# Patient Record
Sex: MALE | Race: WHITE | Employment: OTHER | URBAN - METROPOLITAN AREA
[De-identification: names, ages, dates, MRNs, and addresses within clinical notes are randomized per-mention and may not be internally consistent; named-entity substitution may affect disease eponyms.]

---

## 2017-04-18 ENCOUNTER — OFFICE VISIT (OUTPATIENT)
Dept: CARDIOLOGY CLINIC | Age: 62
End: 2017-04-18

## 2017-04-18 VITALS
WEIGHT: 210 LBS | DIASTOLIC BLOOD PRESSURE: 70 MMHG | SYSTOLIC BLOOD PRESSURE: 122 MMHG | HEART RATE: 66 BPM | BODY MASS INDEX: 25.57 KG/M2 | HEIGHT: 76 IN

## 2017-04-18 DIAGNOSIS — R07.9 CHEST PAIN, UNSPECIFIED TYPE: Primary | ICD-10-CM

## 2017-04-18 PROCEDURE — 93000 ELECTROCARDIOGRAM COMPLETE: CPT | Performed by: INTERNAL MEDICINE

## 2017-04-18 PROCEDURE — 99214 OFFICE O/P EST MOD 30 MIN: CPT | Performed by: INTERNAL MEDICINE

## 2017-04-18 RX ORDER — ATORVASTATIN CALCIUM 80 MG/1
80 TABLET, FILM COATED ORAL DAILY
Qty: 90 TABLET | Refills: 3 | Status: SHIPPED
Start: 2017-04-18

## 2017-05-01 ENCOUNTER — PROCEDURE VISIT (OUTPATIENT)
Dept: CARDIOLOGY CLINIC | Age: 62
End: 2017-05-01

## 2017-05-01 DIAGNOSIS — Z95.810 BIVENTRICULAR ICD (IMPLANTABLE CARDIOVERTER-DEFIBRILLATOR) IN PLACE: Primary | ICD-10-CM

## 2017-05-01 DIAGNOSIS — R07.9 CHEST PAIN, UNSPECIFIED TYPE: ICD-10-CM

## 2017-05-01 LAB
LV EF: 16 %
LVEF MODALITY: NORMAL

## 2017-05-01 PROCEDURE — 93016 CV STRESS TEST SUPVJ ONLY: CPT | Performed by: INTERNAL MEDICINE

## 2017-05-01 PROCEDURE — 78452 HT MUSCLE IMAGE SPECT MULT: CPT | Performed by: INTERNAL MEDICINE

## 2017-05-01 PROCEDURE — A9500 TC99M SESTAMIBI: HCPCS | Performed by: INTERNAL MEDICINE

## 2017-05-01 PROCEDURE — 93018 CV STRESS TEST I&R ONLY: CPT | Performed by: INTERNAL MEDICINE

## 2017-05-01 PROCEDURE — 93017 CV STRESS TEST TRACING ONLY: CPT | Performed by: INTERNAL MEDICINE

## 2017-05-03 ENCOUNTER — TELEPHONE (OUTPATIENT)
Dept: CARDIOLOGY CLINIC | Age: 62
End: 2017-05-03

## 2017-12-08 ENCOUNTER — TELEPHONE (OUTPATIENT)
Dept: CARDIOLOGY CLINIC | Age: 62
End: 2017-12-08

## 2017-12-08 NOTE — TELEPHONE ENCOUNTER
I called [facility] and spoke with patients nurse Noe Trejo RN. I advised her that patient has a ICD and it has not been checked since 10/2016 and that he has had several appointments that he has not come to. She said that patient usually refuses to come to his appointments. She did make a appointment for patient to come in and have the device checked. Noe Trejo said that she will make the appointment and hope that he comes in, she said that is all that they can do.

## 2017-12-20 ENCOUNTER — PROCEDURE VISIT (OUTPATIENT)
Dept: CARDIOLOGY CLINIC | Age: 62
End: 2017-12-20

## 2017-12-20 VITALS — DIASTOLIC BLOOD PRESSURE: 68 MMHG | HEART RATE: 62 BPM | SYSTOLIC BLOOD PRESSURE: 102 MMHG

## 2017-12-20 DIAGNOSIS — Z95.810 BIVENTRICULAR ICD (IMPLANTABLE CARDIOVERTER-DEFIBRILLATOR) IN PLACE: Primary | ICD-10-CM

## 2017-12-20 PROCEDURE — 93284 PRGRMG EVAL IMPLANTABLE DFB: CPT | Performed by: INTERNAL MEDICINE

## 2017-12-20 NOTE — PROCEDURES
Tera Max  1955  Diomedes Arambula MD  Chief Complaint   Patient presents with    Procedure     ICD check     /68   Pulse 62     ICD analysis is consistent with normal Biventricular [employer] ICD function with stable leads and appropriate battery status for the age of the device. No therapies detected. Programming parameters are for the optimum function for this device in this patient. Device is  84% pacing in both ventricles. Paroxysmal atrial fibrillation as noted. Patient is on anticoagulation therapy. Device longevity is 1.5 years. .    Recommend every three month device check and follow up office visit as scheduled.     Wesley Jean MD, 12/20/2017 3:07 PM

## 2018-03-09 LAB
ALBUMIN SERPL-MCNC: 3.4 G/DL
ALP BLD-CCNC: 61 U/L
ALT SERPL-CCNC: 8 U/L
ANION GAP SERPL CALCULATED.3IONS-SCNC: NORMAL MMOL/L
AST SERPL-CCNC: 10 U/L
BASOPHILS ABSOLUTE: ABNORMAL /ΜL
BASOPHILS RELATIVE PERCENT: ABNORMAL %
BILIRUB SERPL-MCNC: 0.2 MG/DL (ref 0.1–1.4)
BUN BLDV-MCNC: 18 MG/DL
CALCIUM SERPL-MCNC: 8.8 MG/DL
CHLORIDE BLD-SCNC: 108 MMOL/L
CHOLESTEROL, TOTAL: 205 MG/DL
CHOLESTEROL/HDL RATIO: NORMAL
CO2: 30 MMOL/L
CREAT SERPL-MCNC: 1.2 MG/DL
EOSINOPHILS ABSOLUTE: ABNORMAL /ΜL
EOSINOPHILS RELATIVE PERCENT: ABNORMAL %
GFR CALCULATED: NORMAL
GLUCOSE BLD-MCNC: 105 MG/DL
HCT VFR BLD CALC: 38.5 % (ref 41–53)
HDLC SERPL-MCNC: 40 MG/DL (ref 35–70)
HEMOGLOBIN: 13.1 G/DL (ref 13.5–17.5)
LDL CHOLESTEROL CALCULATED: 154 MG/DL (ref 0–160)
LYMPHOCYTES ABSOLUTE: ABNORMAL /ΜL
LYMPHOCYTES RELATIVE PERCENT: ABNORMAL %
MCH RBC QN AUTO: ABNORMAL PG
MCHC RBC AUTO-ENTMCNC: ABNORMAL G/DL
MCV RBC AUTO: ABNORMAL FL
MONOCYTES ABSOLUTE: ABNORMAL /ΜL
MONOCYTES RELATIVE PERCENT: ABNORMAL %
NEUTROPHILS ABSOLUTE: ABNORMAL /ΜL
NEUTROPHILS RELATIVE PERCENT: ABNORMAL %
PLATELET # BLD: 185 K/ΜL
PMV BLD AUTO: ABNORMAL FL
POTASSIUM SERPL-SCNC: 4.6 MMOL/L
RBC # BLD: 4.21 10^6/ΜL
SODIUM BLD-SCNC: 140 MMOL/L
TOTAL PROTEIN: 5.4
TRIGL SERPL-MCNC: 55 MG/DL
TSH SERPL DL<=0.05 MIU/L-ACNC: 1.66 UIU/ML
VLDLC SERPL CALC-MCNC: NORMAL MG/DL
WBC # BLD: 7.3 10^3/ML

## 2018-03-28 ENCOUNTER — PROCEDURE VISIT (OUTPATIENT)
Dept: CARDIOLOGY CLINIC | Age: 63
End: 2018-03-28

## 2018-03-28 VITALS — SYSTOLIC BLOOD PRESSURE: 102 MMHG | HEART RATE: 76 BPM | DIASTOLIC BLOOD PRESSURE: 74 MMHG

## 2018-03-28 DIAGNOSIS — Z95.810 BIVENTRICULAR ICD (IMPLANTABLE CARDIOVERTER-DEFIBRILLATOR) IN PLACE: Primary | ICD-10-CM

## 2018-03-28 PROCEDURE — 93284 PRGRMG EVAL IMPLANTABLE DFB: CPT | Performed by: INTERNAL MEDICINE

## 2018-07-11 ENCOUNTER — PROCEDURE VISIT (OUTPATIENT)
Dept: CARDIOLOGY CLINIC | Age: 63
End: 2018-07-11

## 2018-07-11 VITALS — SYSTOLIC BLOOD PRESSURE: 136 MMHG | DIASTOLIC BLOOD PRESSURE: 84 MMHG | HEART RATE: 96 BPM

## 2018-07-11 DIAGNOSIS — Z95.810 BIVENTRICULAR ICD (IMPLANTABLE CARDIOVERTER-DEFIBRILLATOR) IN PLACE: Primary | ICD-10-CM

## 2018-07-11 PROCEDURE — 93284 PRGRMG EVAL IMPLANTABLE DFB: CPT | Performed by: INTERNAL MEDICINE

## 2018-07-13 NOTE — PROCEDURES
Paul Orta  1955  Tracy Turner MD  Chief Complaint   Patient presents with    Procedure     Patient here for ICD check     /84   Pulse 96     ICD analysis is consistent with normal [employer] CRT ICD function with stable leads and appropriate battery status for the age of the device. No therapies detected. Programming parameters are for the optimum function for this device in this patient. Device is  pacing in the atrium 25%, pacing in the right ventricle 84%, pacing in the left ventricle 85%. Remaining device longevity is 1 year. 461 episodes of nonsustained ventricular tachycardia recorded since July 2016. No therapies are noted. Recommend every three month device check and follow up office visit as scheduled.     Sarah Martin MD, 7/12/2018 9:27 PM

## 2019-03-28 ENCOUNTER — PROCEDURE VISIT (OUTPATIENT)
Dept: CARDIOLOGY CLINIC | Age: 64
End: 2019-03-28
Payer: COMMERCIAL

## 2019-03-28 ENCOUNTER — OFFICE VISIT (OUTPATIENT)
Dept: CARDIOLOGY CLINIC | Age: 64
End: 2019-03-28
Payer: COMMERCIAL

## 2019-03-28 VITALS
BODY MASS INDEX: 24.94 KG/M2 | HEIGHT: 76 IN | SYSTOLIC BLOOD PRESSURE: 110 MMHG | WEIGHT: 204.8 LBS | HEART RATE: 70 BPM | DIASTOLIC BLOOD PRESSURE: 78 MMHG

## 2019-03-28 VITALS — DIASTOLIC BLOOD PRESSURE: 78 MMHG | HEART RATE: 70 BPM | SYSTOLIC BLOOD PRESSURE: 110 MMHG

## 2019-03-28 DIAGNOSIS — Z95.810 BIVENTRICULAR ICD (IMPLANTABLE CARDIOVERTER-DEFIBRILLATOR) IN PLACE: Primary | ICD-10-CM

## 2019-03-28 DIAGNOSIS — I48.0 PAROXYSMAL ATRIAL FIBRILLATION (HCC): ICD-10-CM

## 2019-03-28 DIAGNOSIS — I48.91 ATRIAL FIBRILLATION, UNSPECIFIED TYPE (HCC): ICD-10-CM

## 2019-03-28 PROCEDURE — 3017F COLORECTAL CA SCREEN DOC REV: CPT | Performed by: INTERNAL MEDICINE

## 2019-03-28 PROCEDURE — 99214 OFFICE O/P EST MOD 30 MIN: CPT | Performed by: INTERNAL MEDICINE

## 2019-03-28 PROCEDURE — 4004F PT TOBACCO SCREEN RCVD TLK: CPT | Performed by: INTERNAL MEDICINE

## 2019-03-28 PROCEDURE — G8484 FLU IMMUNIZE NO ADMIN: HCPCS | Performed by: INTERNAL MEDICINE

## 2019-03-28 PROCEDURE — G8598 ASA/ANTIPLAT THER USED: HCPCS | Performed by: INTERNAL MEDICINE

## 2019-03-28 PROCEDURE — G8420 CALC BMI NORM PARAMETERS: HCPCS | Performed by: INTERNAL MEDICINE

## 2019-03-28 PROCEDURE — G8427 DOCREV CUR MEDS BY ELIG CLIN: HCPCS | Performed by: INTERNAL MEDICINE

## 2019-03-28 PROCEDURE — 93284 PRGRMG EVAL IMPLANTABLE DFB: CPT | Performed by: INTERNAL MEDICINE

## 2019-03-28 RX ORDER — EZETIMIBE 10 MG/1
10 TABLET ORAL DAILY
COMMUNITY

## 2019-03-28 RX ORDER — ARIPIPRAZOLE 30 MG/1
30 TABLET ORAL EVERY MORNING
COMMUNITY
End: 2020-03-11

## 2019-06-27 ENCOUNTER — TELEPHONE (OUTPATIENT)
Dept: CARDIOLOGY CLINIC | Age: 64
End: 2019-06-27

## 2019-06-27 NOTE — TELEPHONE ENCOUNTER
Called [employer] and spoke with patients nurse [place] to see if we could schedule patient to come in and have his ICD checked. Patient was last checked in March 2019 and had <3 months battery life. Rico said that he refuses to come in for appointments. He said that he does not want to see a foreign doctor. I asked if he would speak to me just to come in and have his ICD checked. Patient refused to speak with me and told the aide that he is not coming into the office for a appointment. I advised Rico to look for signs of his ICD battery reaching end of life. Patient or staff could hear alarms and patient could shows signs of shortness of breath etc. I told her that patient may end up in ED and the battery will have to be replaced emergently. I did not know what else to say or do because patient refuses to come into the office. Rico said that she will chart that I called and patient refusing to speak with me or schedule a appointment.

## 2019-09-30 ENCOUNTER — APPOINTMENT (OUTPATIENT)
Dept: GENERAL RADIOLOGY | Age: 64
DRG: 245 | End: 2019-09-30
Payer: MEDICARE

## 2019-09-30 ENCOUNTER — HOSPITAL ENCOUNTER (INPATIENT)
Age: 64
LOS: 4 days | Discharge: NURSING, REHAB OR LONG-TERM CARE | DRG: 245 | End: 2019-10-04
Attending: EMERGENCY MEDICINE | Admitting: INTERNAL MEDICINE
Payer: MEDICARE

## 2019-09-30 DIAGNOSIS — I48.91 ATRIAL FIBRILLATION WITH RVR (HCC): Primary | ICD-10-CM

## 2019-09-30 DIAGNOSIS — Z45.02 ICD (IMPLANTABLE CARDIOVERTER-DEFIBRILLATOR) BATTERY DEPLETION: ICD-10-CM

## 2019-09-30 PROBLEM — R77.8 ELEVATED TROPONIN LEVEL: Status: ACTIVE | Noted: 2019-09-30

## 2019-09-30 PROBLEM — R79.89 ELEVATED BRAIN NATRIURETIC PEPTIDE (BNP) LEVEL: Status: ACTIVE | Noted: 2019-09-30

## 2019-09-30 PROBLEM — N17.9 ACUTE KIDNEY INJURY (HCC): Status: ACTIVE | Noted: 2019-09-30

## 2019-09-30 PROBLEM — R79.89 ELEVATED TROPONIN LEVEL: Status: ACTIVE | Noted: 2019-09-30

## 2019-09-30 PROBLEM — T82.118A ICD (IMPLANTABLE CARDIOVERTER-DEFIBRILLATOR) MALFUNCTION: Status: ACTIVE | Noted: 2019-09-30

## 2019-09-30 LAB
ALBUMIN SERPL-MCNC: 4.1 GM/DL (ref 3.4–5)
ALP BLD-CCNC: 125 IU/L (ref 40–129)
ALT SERPL-CCNC: 17 U/L (ref 10–40)
ANION GAP SERPL CALCULATED.3IONS-SCNC: 13 MMOL/L (ref 4–16)
AST SERPL-CCNC: 25 IU/L (ref 15–37)
BASOPHILS ABSOLUTE: 0.1 K/CU MM
BASOPHILS RELATIVE PERCENT: 0.5 % (ref 0–1)
BILIRUB SERPL-MCNC: 0.4 MG/DL (ref 0–1)
BUN BLDV-MCNC: 17 MG/DL (ref 6–23)
CALCIUM SERPL-MCNC: 8.8 MG/DL (ref 8.3–10.6)
CHLORIDE BLD-SCNC: 104 MMOL/L (ref 99–110)
CO2: 22 MMOL/L (ref 21–32)
CREAT SERPL-MCNC: 1.4 MG/DL (ref 0.9–1.3)
DIFFERENTIAL TYPE: ABNORMAL
EKG DIAGNOSIS: NORMAL
EKG Q-T INTERVAL: 356 MS
EKG QRS DURATION: 146 MS
EKG QTC CALCULATION (BAZETT): 507 MS
EKG R AXIS: -48 DEGREES
EKG T AXIS: 126 DEGREES
EKG VENTRICULAR RATE: 122 BPM
EOSINOPHILS ABSOLUTE: 0.1 K/CU MM
EOSINOPHILS RELATIVE PERCENT: 1.5 % (ref 0–3)
GFR AFRICAN AMERICAN: >60 ML/MIN/1.73M2
GFR NON-AFRICAN AMERICAN: 51 ML/MIN/1.73M2
GLUCOSE BLD-MCNC: 147 MG/DL (ref 70–99)
HCT VFR BLD CALC: 48 % (ref 42–52)
HEMOGLOBIN: 14.9 GM/DL (ref 13.5–18)
IMMATURE NEUTROPHIL %: 0.2 % (ref 0–0.43)
INR BLD: 1.57 INDEX
LYMPHOCYTES ABSOLUTE: 2.5 K/CU MM
LYMPHOCYTES RELATIVE PERCENT: 25.9 % (ref 24–44)
MAGNESIUM: 2 MG/DL (ref 1.8–2.4)
MCH RBC QN AUTO: 29.4 PG (ref 27–31)
MCHC RBC AUTO-ENTMCNC: 31 % (ref 32–36)
MCV RBC AUTO: 94.7 FL (ref 78–100)
MONOCYTES ABSOLUTE: 1.3 K/CU MM
MONOCYTES RELATIVE PERCENT: 13.1 % (ref 0–4)
NUCLEATED RBC %: 0 %
PDW BLD-RTO: 14.4 % (ref 11.7–14.9)
PLATELET # BLD: 206 K/CU MM (ref 140–440)
PMV BLD AUTO: 10.6 FL (ref 7.5–11.1)
POTASSIUM SERPL-SCNC: 4.1 MMOL/L (ref 3.5–5.1)
PRO-BNP: 1941 PG/ML
PROTHROMBIN TIME: 18.3 SECONDS (ref 9.12–12.5)
RBC # BLD: 5.07 M/CU MM (ref 4.6–6.2)
SEGMENTED NEUTROPHILS ABSOLUTE COUNT: 5.6 K/CU MM
SEGMENTED NEUTROPHILS RELATIVE PERCENT: 58.8 % (ref 36–66)
SODIUM BLD-SCNC: 139 MMOL/L (ref 135–145)
TOTAL IMMATURE NEUTOROPHIL: 0.02 K/CU MM
TOTAL NUCLEATED RBC: 0 K/CU MM
TOTAL PROTEIN: 6.9 GM/DL (ref 6.4–8.2)
TROPONIN T: 0.01 NG/ML
TROPONIN T: 0.19 NG/ML
TROPONIN T: 0.21 NG/ML
TSH HIGH SENSITIVITY: 1.35 UIU/ML (ref 0.27–4.2)
WBC # BLD: 9.6 K/CU MM (ref 4–10.5)

## 2019-09-30 PROCEDURE — 2580000003 HC RX 258: Performed by: EMERGENCY MEDICINE

## 2019-09-30 PROCEDURE — 83880 ASSAY OF NATRIURETIC PEPTIDE: CPT

## 2019-09-30 PROCEDURE — 99285 EMERGENCY DEPT VISIT HI MDM: CPT

## 2019-09-30 PROCEDURE — 85025 COMPLETE CBC W/AUTO DIFF WBC: CPT

## 2019-09-30 PROCEDURE — 80053 COMPREHEN METABOLIC PANEL: CPT

## 2019-09-30 PROCEDURE — 93010 ELECTROCARDIOGRAM REPORT: CPT | Performed by: INTERNAL MEDICINE

## 2019-09-30 PROCEDURE — 96365 THER/PROPH/DIAG IV INF INIT: CPT

## 2019-09-30 PROCEDURE — 71045 X-RAY EXAM CHEST 1 VIEW: CPT

## 2019-09-30 PROCEDURE — 99222 1ST HOSP IP/OBS MODERATE 55: CPT | Performed by: INTERNAL MEDICINE

## 2019-09-30 PROCEDURE — 84443 ASSAY THYROID STIM HORMONE: CPT

## 2019-09-30 PROCEDURE — 94761 N-INVAS EAR/PLS OXIMETRY MLT: CPT

## 2019-09-30 PROCEDURE — 2140000000 HC CCU INTERMEDIATE R&B

## 2019-09-30 PROCEDURE — 2580000003 HC RX 258: Performed by: NURSE PRACTITIONER

## 2019-09-30 PROCEDURE — 84484 ASSAY OF TROPONIN QUANT: CPT

## 2019-09-30 PROCEDURE — 83735 ASSAY OF MAGNESIUM: CPT

## 2019-09-30 PROCEDURE — 93005 ELECTROCARDIOGRAM TRACING: CPT | Performed by: EMERGENCY MEDICINE

## 2019-09-30 PROCEDURE — 36415 COLL VENOUS BLD VENIPUNCTURE: CPT

## 2019-09-30 PROCEDURE — 96366 THER/PROPH/DIAG IV INF ADDON: CPT

## 2019-09-30 PROCEDURE — 6370000000 HC RX 637 (ALT 250 FOR IP): Performed by: NURSE PRACTITIONER

## 2019-09-30 PROCEDURE — 85610 PROTHROMBIN TIME: CPT

## 2019-09-30 PROCEDURE — 2500000003 HC RX 250 WO HCPCS: Performed by: EMERGENCY MEDICINE

## 2019-09-30 RX ORDER — ATORVASTATIN CALCIUM 40 MG/1
80 TABLET, FILM COATED ORAL DAILY
Status: DISCONTINUED | OUTPATIENT
Start: 2019-10-01 | End: 2019-10-04 | Stop reason: HOSPADM

## 2019-09-30 RX ORDER — SODIUM CHLORIDE 9 MG/ML
INJECTION, SOLUTION INTRAVENOUS CONTINUOUS
Status: DISCONTINUED | OUTPATIENT
Start: 2019-09-30 | End: 2019-09-30

## 2019-09-30 RX ORDER — SODIUM CHLORIDE 0.9 % (FLUSH) 0.9 %
10 SYRINGE (ML) INJECTION EVERY 12 HOURS SCHEDULED
Status: DISCONTINUED | OUTPATIENT
Start: 2019-09-30 | End: 2019-10-04 | Stop reason: HOSPADM

## 2019-09-30 RX ORDER — NICOTINE 21 MG/24HR
1 PATCH, TRANSDERMAL 24 HOURS TRANSDERMAL DAILY
Status: DISCONTINUED | OUTPATIENT
Start: 2019-09-30 | End: 2019-10-04 | Stop reason: HOSPADM

## 2019-09-30 RX ORDER — ACETAMINOPHEN 325 MG/1
650 TABLET ORAL EVERY 4 HOURS PRN
COMMUNITY

## 2019-09-30 RX ORDER — WARFARIN SODIUM 2.5 MG/1
2.5 TABLET ORAL DAILY
Status: DISCONTINUED | OUTPATIENT
Start: 2019-09-30 | End: 2019-09-30 | Stop reason: DRUGHIGH

## 2019-09-30 RX ORDER — ARIPIPRAZOLE 5 MG/1
20 TABLET ORAL DAILY
Status: DISCONTINUED | OUTPATIENT
Start: 2019-10-01 | End: 2019-10-04 | Stop reason: HOSPADM

## 2019-09-30 RX ORDER — FAMOTIDINE 20 MG/1
20 TABLET, FILM COATED ORAL 2 TIMES DAILY
Status: DISCONTINUED | OUTPATIENT
Start: 2019-09-30 | End: 2019-10-04 | Stop reason: HOSPADM

## 2019-09-30 RX ORDER — SODIUM CHLORIDE 9 MG/ML
INJECTION, SOLUTION INTRAVENOUS CONTINUOUS
Status: DISPENSED | OUTPATIENT
Start: 2019-09-30 | End: 2019-10-01

## 2019-09-30 RX ORDER — WARFARIN SODIUM 2.5 MG/1
2 TABLET ORAL
COMMUNITY

## 2019-09-30 RX ORDER — CETIRIZINE HYDROCHLORIDE 5 MG/1
10 TABLET ORAL DAILY
Status: DISCONTINUED | OUTPATIENT
Start: 2019-10-01 | End: 2019-10-04 | Stop reason: HOSPADM

## 2019-09-30 RX ORDER — HYDRALAZINE HYDROCHLORIDE 20 MG/ML
10 INJECTION INTRAMUSCULAR; INTRAVENOUS EVERY 6 HOURS PRN
Status: DISCONTINUED | OUTPATIENT
Start: 2019-09-30 | End: 2019-10-04 | Stop reason: HOSPADM

## 2019-09-30 RX ORDER — NITROGLYCERIN 0.4 MG/1
0.4 TABLET SUBLINGUAL EVERY 5 MIN PRN
Status: DISCONTINUED | OUTPATIENT
Start: 2019-09-30 | End: 2019-10-04 | Stop reason: HOSPADM

## 2019-09-30 RX ORDER — IBUPROFEN 600 MG/1
600 TABLET ORAL EVERY 6 HOURS PRN
Status: ON HOLD | COMMUNITY
End: 2019-10-04 | Stop reason: HOSPADM

## 2019-09-30 RX ORDER — ACETAMINOPHEN 325 MG/1
650 TABLET ORAL EVERY 4 HOURS PRN
Status: DISCONTINUED | OUTPATIENT
Start: 2019-09-30 | End: 2019-10-04 | Stop reason: HOSPADM

## 2019-09-30 RX ORDER — BENZTROPINE MESYLATE 1 MG/1
1 TABLET ORAL 2 TIMES DAILY
Status: DISCONTINUED | OUTPATIENT
Start: 2019-09-30 | End: 2019-10-04 | Stop reason: HOSPADM

## 2019-09-30 RX ORDER — ONDANSETRON 2 MG/ML
4 INJECTION INTRAMUSCULAR; INTRAVENOUS EVERY 6 HOURS PRN
Status: DISCONTINUED | OUTPATIENT
Start: 2019-09-30 | End: 2019-10-04 | Stop reason: HOSPADM

## 2019-09-30 RX ORDER — DILTIAZEM HYDROCHLORIDE 5 MG/ML
10 INJECTION INTRAVENOUS ONCE
Status: COMPLETED | OUTPATIENT
Start: 2019-09-30 | End: 2019-09-30

## 2019-09-30 RX ORDER — WARFARIN SODIUM 3 MG/1
3 TABLET ORAL DAILY
Status: DISCONTINUED | OUTPATIENT
Start: 2019-09-30 | End: 2019-10-01

## 2019-09-30 RX ORDER — LEVOTHYROXINE SODIUM 0.07 MG/1
75 TABLET ORAL DAILY
Status: DISCONTINUED | OUTPATIENT
Start: 2019-10-01 | End: 2019-10-04 | Stop reason: HOSPADM

## 2019-09-30 RX ORDER — EZETIMIBE 10 MG/1
10 TABLET ORAL DAILY
Status: DISCONTINUED | OUTPATIENT
Start: 2019-09-30 | End: 2019-09-30 | Stop reason: CLARIF

## 2019-09-30 RX ORDER — SODIUM CHLORIDE 0.9 % (FLUSH) 0.9 %
10 SYRINGE (ML) INJECTION PRN
Status: DISCONTINUED | OUTPATIENT
Start: 2019-09-30 | End: 2019-10-04 | Stop reason: HOSPADM

## 2019-09-30 RX ADMIN — BENZTROPINE MESYLATE 1 MG: 1 TABLET ORAL at 21:35

## 2019-09-30 RX ADMIN — SODIUM CHLORIDE: 9 INJECTION, SOLUTION INTRAVENOUS at 21:37

## 2019-09-30 RX ADMIN — DILTIAZEM HYDROCHLORIDE 10 MG: 5 INJECTION INTRAVENOUS at 15:31

## 2019-09-30 RX ADMIN — Medication 10 ML: at 21:38

## 2019-09-30 RX ADMIN — DILTIAZEM HYDROCHLORIDE 30 MG: 30 TABLET, FILM COATED ORAL at 21:35

## 2019-09-30 RX ADMIN — FAMOTIDINE 20 MG: 20 TABLET ORAL at 21:35

## 2019-09-30 RX ADMIN — WARFARIN SODIUM 3 MG: 3 TABLET ORAL at 21:35

## 2019-09-30 RX ADMIN — DILTIAZEM HYDROCHLORIDE 2.5 MG/HR: 5 INJECTION INTRAVENOUS at 15:59

## 2019-09-30 ASSESSMENT — PAIN SCALES - GENERAL
PAINLEVEL_OUTOF10: 0

## 2019-10-01 ENCOUNTER — APPOINTMENT (OUTPATIENT)
Dept: CT IMAGING | Age: 64
DRG: 245 | End: 2019-10-01
Payer: MEDICARE

## 2019-10-01 PROBLEM — Z45.02 ICD (IMPLANTABLE CARDIOVERTER-DEFIBRILLATOR) BATTERY DEPLETION: Status: ACTIVE | Noted: 2019-10-01

## 2019-10-01 LAB
ALBUMIN SERPL-MCNC: 4 GM/DL (ref 3.4–5)
ALP BLD-CCNC: 117 IU/L (ref 40–128)
ALT SERPL-CCNC: 17 U/L (ref 10–40)
AMMONIA: 33 UMOL/L (ref 16–60)
ANION GAP SERPL CALCULATED.3IONS-SCNC: 10 MMOL/L (ref 4–16)
AST SERPL-CCNC: 42 IU/L (ref 15–37)
BACTERIA: NEGATIVE /HPF
BASOPHILS ABSOLUTE: 0 K/CU MM
BASOPHILS RELATIVE PERCENT: 0.4 % (ref 0–1)
BILIRUB SERPL-MCNC: 0.5 MG/DL (ref 0–1)
BILIRUBIN URINE: NEGATIVE MG/DL
BLOOD, URINE: NEGATIVE
BUN BLDV-MCNC: 15 MG/DL (ref 6–23)
CALCIUM SERPL-MCNC: 9.2 MG/DL (ref 8.3–10.6)
CHLORIDE BLD-SCNC: 105 MMOL/L (ref 99–110)
CLARITY: CLEAR
CO2: 26 MMOL/L (ref 21–32)
COLOR: ABNORMAL
CREAT SERPL-MCNC: 1.3 MG/DL (ref 0.9–1.3)
DIFFERENTIAL TYPE: ABNORMAL
EKG ATRIAL RATE: 70 BPM
EKG DIAGNOSIS: NORMAL
EKG P AXIS: 30 DEGREES
EKG P-R INTERVAL: 188 MS
EKG Q-T INTERVAL: 420 MS
EKG QRS DURATION: 154 MS
EKG QTC CALCULATION (BAZETT): 453 MS
EKG R AXIS: -45 DEGREES
EKG T AXIS: 219 DEGREES
EKG VENTRICULAR RATE: 70 BPM
EOSINOPHILS ABSOLUTE: 0.2 K/CU MM
EOSINOPHILS RELATIVE PERCENT: 1.6 % (ref 0–3)
GFR AFRICAN AMERICAN: >60 ML/MIN/1.73M2
GFR NON-AFRICAN AMERICAN: 56 ML/MIN/1.73M2
GLUCOSE BLD-MCNC: 106 MG/DL (ref 70–99)
GLUCOSE, URINE: NEGATIVE MG/DL
HCT VFR BLD CALC: 46.7 % (ref 42–52)
HEMOGLOBIN: 14.9 GM/DL (ref 13.5–18)
IMMATURE NEUTROPHIL %: 0.5 % (ref 0–0.43)
INR BLD: 1.85 INDEX
INR BLD: 2.16 INDEX
KETONES, URINE: NEGATIVE MG/DL
LEUKOCYTE ESTERASE, URINE: ABNORMAL
LV EF: 15 %
LVEF MODALITY: NORMAL
LYMPHOCYTES ABSOLUTE: 2.1 K/CU MM
LYMPHOCYTES RELATIVE PERCENT: 20.1 % (ref 24–44)
MAGNESIUM: 2 MG/DL (ref 1.8–2.4)
MCH RBC QN AUTO: 29.3 PG (ref 27–31)
MCHC RBC AUTO-ENTMCNC: 31.9 % (ref 32–36)
MCV RBC AUTO: 91.7 FL (ref 78–100)
MONOCYTES ABSOLUTE: 1.1 K/CU MM
MONOCYTES RELATIVE PERCENT: 11.1 % (ref 0–4)
NITRITE URINE, QUANTITATIVE: NEGATIVE
NUCLEATED RBC %: 0 %
PDW BLD-RTO: 14.3 % (ref 11.7–14.9)
PH, URINE: 6 (ref 5–8)
PLATELET # BLD: 203 K/CU MM (ref 140–440)
PMV BLD AUTO: 10.8 FL (ref 7.5–11.1)
POTASSIUM SERPL-SCNC: 3.9 MMOL/L (ref 3.5–5.1)
PROTEIN UA: NEGATIVE MG/DL
PROTHROMBIN TIME: 21.2 SECONDS (ref 9.12–12.5)
PROTHROMBIN TIME: 24.8 SECONDS (ref 9.12–12.5)
RBC # BLD: 5.09 M/CU MM (ref 4.6–6.2)
RBC URINE: ABNORMAL /HPF (ref 0–3)
SEGMENTED NEUTROPHILS ABSOLUTE COUNT: 6.8 K/CU MM
SEGMENTED NEUTROPHILS RELATIVE PERCENT: 66.3 % (ref 36–66)
SODIUM BLD-SCNC: 141 MMOL/L (ref 135–145)
SPECIFIC GRAVITY UA: 1 (ref 1–1.03)
TOTAL IMMATURE NEUTOROPHIL: 0.05 K/CU MM
TOTAL NUCLEATED RBC: 0 K/CU MM
TOTAL PROTEIN: 6.3 GM/DL (ref 6.4–8.2)
TRICHOMONAS: ABNORMAL /HPF
UROBILINOGEN, URINE: NORMAL MG/DL (ref 0.2–1)
WBC # BLD: 10.2 K/CU MM (ref 4–10.5)
WBC UA: 2 /HPF (ref 0–2)

## 2019-10-01 PROCEDURE — 83735 ASSAY OF MAGNESIUM: CPT

## 2019-10-01 PROCEDURE — 93005 ELECTROCARDIOGRAM TRACING: CPT | Performed by: NURSE PRACTITIONER

## 2019-10-01 PROCEDURE — 80053 COMPREHEN METABOLIC PANEL: CPT

## 2019-10-01 PROCEDURE — APPNB45 APP NON BILLABLE 31-45 MINUTES: Performed by: NURSE PRACTITIONER

## 2019-10-01 PROCEDURE — 6370000000 HC RX 637 (ALT 250 FOR IP): Performed by: NURSE PRACTITIONER

## 2019-10-01 PROCEDURE — 85610 PROTHROMBIN TIME: CPT

## 2019-10-01 PROCEDURE — 93010 ELECTROCARDIOGRAM REPORT: CPT | Performed by: INTERNAL MEDICINE

## 2019-10-01 PROCEDURE — 93306 TTE W/DOPPLER COMPLETE: CPT

## 2019-10-01 PROCEDURE — 6370000000 HC RX 637 (ALT 250 FOR IP): Performed by: FAMILY MEDICINE

## 2019-10-01 PROCEDURE — 2580000003 HC RX 258: Performed by: NURSE PRACTITIONER

## 2019-10-01 PROCEDURE — 6360000002 HC RX W HCPCS

## 2019-10-01 PROCEDURE — 36415 COLL VENOUS BLD VENIPUNCTURE: CPT

## 2019-10-01 PROCEDURE — 85025 COMPLETE CBC W/AUTO DIFF WBC: CPT

## 2019-10-01 PROCEDURE — 81001 URINALYSIS AUTO W/SCOPE: CPT

## 2019-10-01 PROCEDURE — 2140000000 HC CCU INTERMEDIATE R&B

## 2019-10-01 PROCEDURE — 82140 ASSAY OF AMMONIA: CPT

## 2019-10-01 PROCEDURE — 93283 PRGRMG EVAL IMPLANTABLE DFB: CPT | Performed by: INTERNAL MEDICINE

## 2019-10-01 PROCEDURE — 70450 CT HEAD/BRAIN W/O DYE: CPT

## 2019-10-01 RX ORDER — MORPHINE SULFATE 4 MG/ML
INJECTION, SOLUTION INTRAMUSCULAR; INTRAVENOUS
Status: COMPLETED
Start: 2019-10-01 | End: 2019-10-01

## 2019-10-01 RX ORDER — MORPHINE SULFATE 4 MG/ML
2 INJECTION, SOLUTION INTRAMUSCULAR; INTRAVENOUS ONCE
Status: COMPLETED | OUTPATIENT
Start: 2019-10-01 | End: 2019-10-01

## 2019-10-01 RX ORDER — TRAMADOL HYDROCHLORIDE 50 MG/1
50 TABLET ORAL EVERY 6 HOURS PRN
Status: DISCONTINUED | OUTPATIENT
Start: 2019-10-01 | End: 2019-10-04 | Stop reason: HOSPADM

## 2019-10-01 RX ORDER — WARFARIN SODIUM 3 MG/1
3 TABLET ORAL DAILY
Status: DISCONTINUED | OUTPATIENT
Start: 2019-10-01 | End: 2019-10-01

## 2019-10-01 RX ORDER — WARFARIN SODIUM 3 MG/1
3 TABLET ORAL DAILY
Status: DISCONTINUED | OUTPATIENT
Start: 2019-10-04 | End: 2019-10-01

## 2019-10-01 RX ADMIN — MORPHINE SULFATE 2 MG: 4 INJECTION, SOLUTION INTRAMUSCULAR; INTRAVENOUS at 19:04

## 2019-10-01 RX ADMIN — FAMOTIDINE 20 MG: 20 TABLET ORAL at 09:41

## 2019-10-01 RX ADMIN — ARIPIPRAZOLE 20 MG: 5 TABLET ORAL at 09:42

## 2019-10-01 RX ADMIN — DILTIAZEM HYDROCHLORIDE 30 MG: 30 TABLET, FILM COATED ORAL at 06:24

## 2019-10-01 RX ADMIN — DILTIAZEM HYDROCHLORIDE 30 MG: 30 TABLET, FILM COATED ORAL at 21:52

## 2019-10-01 RX ADMIN — Medication 10 ML: at 22:05

## 2019-10-01 RX ADMIN — DILTIAZEM HYDROCHLORIDE 30 MG: 30 TABLET, FILM COATED ORAL at 13:59

## 2019-10-01 RX ADMIN — ACETAMINOPHEN 650 MG: 325 TABLET ORAL at 09:42

## 2019-10-01 RX ADMIN — BENZTROPINE MESYLATE 1 MG: 1 TABLET ORAL at 21:52

## 2019-10-01 RX ADMIN — BENZTROPINE MESYLATE 1 MG: 1 TABLET ORAL at 09:42

## 2019-10-01 RX ADMIN — FAMOTIDINE 20 MG: 20 TABLET ORAL at 21:52

## 2019-10-01 RX ADMIN — TRAMADOL HYDROCHLORIDE 50 MG: 50 TABLET, COATED ORAL at 13:19

## 2019-10-01 RX ADMIN — ACETAMINOPHEN 650 MG: 325 TABLET ORAL at 14:12

## 2019-10-01 RX ADMIN — ATORVASTATIN CALCIUM 80 MG: 40 TABLET, FILM COATED ORAL at 09:41

## 2019-10-01 RX ADMIN — CETIRIZINE HYDROCHLORIDE 10 MG: 5 TABLET ORAL at 09:41

## 2019-10-01 RX ADMIN — LEVOTHYROXINE SODIUM 75 MCG: 75 TABLET ORAL at 09:41

## 2019-10-01 ASSESSMENT — PAIN SCALES - GENERAL
PAINLEVEL_OUTOF10: 10
PAINLEVEL_OUTOF10: 7
PAINLEVEL_OUTOF10: 0
PAINLEVEL_OUTOF10: 5
PAINLEVEL_OUTOF10: 10
PAINLEVEL_OUTOF10: 2
PAINLEVEL_OUTOF10: 0
PAINLEVEL_OUTOF10: 9
PAINLEVEL_OUTOF10: 7
PAINLEVEL_OUTOF10: 0
PAINLEVEL_OUTOF10: 5
PAINLEVEL_OUTOF10: 0
PAINLEVEL_OUTOF10: 5
PAINLEVEL_OUTOF10: 5

## 2019-10-01 ASSESSMENT — ENCOUNTER SYMPTOMS
VOMITING: 0
SHORTNESS OF BREATH: 0
DIARRHEA: 0
COUGH: 0
CONSTIPATION: 0
NAUSEA: 0
BLOOD IN STOOL: 0
EYE PAIN: 0
COLOR CHANGE: 0
WHEEZING: 0
BACK PAIN: 0
PHOTOPHOBIA: 0
ABDOMINAL PAIN: 0
CHEST TIGHTNESS: 0

## 2019-10-01 ASSESSMENT — PAIN DESCRIPTION - PAIN TYPE
TYPE: CHRONIC PAIN

## 2019-10-01 ASSESSMENT — PAIN DESCRIPTION - LOCATION
LOCATION: HIP

## 2019-10-01 ASSESSMENT — PAIN DESCRIPTION - DESCRIPTORS: DESCRIPTORS: ACHING

## 2019-10-01 ASSESSMENT — PAIN DESCRIPTION - ORIENTATION
ORIENTATION: LEFT

## 2019-10-01 ASSESSMENT — PAIN DESCRIPTION - FREQUENCY: FREQUENCY: INTERMITTENT

## 2019-10-02 ENCOUNTER — APPOINTMENT (OUTPATIENT)
Dept: CARDIAC CATH/INVASIVE PROCEDURES | Age: 64
DRG: 245 | End: 2019-10-02
Payer: MEDICARE

## 2019-10-02 LAB
ANION GAP SERPL CALCULATED.3IONS-SCNC: 9 MMOL/L (ref 4–16)
BUN BLDV-MCNC: 17 MG/DL (ref 6–23)
CALCIUM SERPL-MCNC: 9.2 MG/DL (ref 8.3–10.6)
CHLORIDE BLD-SCNC: 107 MMOL/L (ref 99–110)
CO2: 27 MMOL/L (ref 21–32)
CREAT SERPL-MCNC: 1.4 MG/DL (ref 0.9–1.3)
GFR AFRICAN AMERICAN: >60 ML/MIN/1.73M2
GFR NON-AFRICAN AMERICAN: 51 ML/MIN/1.73M2
GLUCOSE BLD-MCNC: 104 MG/DL (ref 70–99)
INR BLD: 2.13 INDEX
POTASSIUM SERPL-SCNC: 4.8 MMOL/L (ref 3.5–5.1)
PROTHROMBIN TIME: 24.5 SECONDS (ref 9.12–12.5)
SODIUM BLD-SCNC: 143 MMOL/L (ref 135–145)

## 2019-10-02 PROCEDURE — 80048 BASIC METABOLIC PNL TOTAL CA: CPT

## 2019-10-02 PROCEDURE — 2580000003 HC RX 258: Performed by: INTERNAL MEDICINE

## 2019-10-02 PROCEDURE — 6370000000 HC RX 637 (ALT 250 FOR IP): Performed by: NURSE PRACTITIONER

## 2019-10-02 PROCEDURE — 6370000000 HC RX 637 (ALT 250 FOR IP): Performed by: FAMILY MEDICINE

## 2019-10-02 PROCEDURE — 99024 POSTOP FOLLOW-UP VISIT: CPT | Performed by: INTERNAL MEDICINE

## 2019-10-02 PROCEDURE — 2580000003 HC RX 258: Performed by: NURSE PRACTITIONER

## 2019-10-02 PROCEDURE — 85610 PROTHROMBIN TIME: CPT

## 2019-10-02 PROCEDURE — 2140000000 HC CCU INTERMEDIATE R&B

## 2019-10-02 RX ORDER — SODIUM CHLORIDE 0.9 % (FLUSH) 0.9 %
10 SYRINGE (ML) INJECTION EVERY 12 HOURS SCHEDULED
Status: DISCONTINUED | OUTPATIENT
Start: 2019-10-02 | End: 2019-10-04 | Stop reason: HOSPADM

## 2019-10-02 RX ORDER — PHYTONADIONE 5 MG/1
5 TABLET ORAL ONCE
Status: DISCONTINUED | OUTPATIENT
Start: 2019-10-02 | End: 2019-10-04 | Stop reason: HOSPADM

## 2019-10-02 RX ORDER — SODIUM CHLORIDE 0.9 % (FLUSH) 0.9 %
10 SYRINGE (ML) INJECTION PRN
Status: DISCONTINUED | OUTPATIENT
Start: 2019-10-02 | End: 2019-10-04 | Stop reason: HOSPADM

## 2019-10-02 RX ORDER — SODIUM CHLORIDE 9 MG/ML
INJECTION, SOLUTION INTRAVENOUS CONTINUOUS
Status: DISCONTINUED | OUTPATIENT
Start: 2019-10-02 | End: 2019-10-04 | Stop reason: HOSPADM

## 2019-10-02 RX ADMIN — DILTIAZEM HYDROCHLORIDE 30 MG: 30 TABLET, FILM COATED ORAL at 08:15

## 2019-10-02 RX ADMIN — CETIRIZINE HYDROCHLORIDE 10 MG: 5 TABLET ORAL at 08:41

## 2019-10-02 RX ADMIN — Medication 10 ML: at 23:15

## 2019-10-02 RX ADMIN — FAMOTIDINE 20 MG: 20 TABLET ORAL at 08:42

## 2019-10-02 RX ADMIN — Medication 10 ML: at 09:25

## 2019-10-02 RX ADMIN — DILTIAZEM HYDROCHLORIDE 30 MG: 30 TABLET, FILM COATED ORAL at 13:27

## 2019-10-02 RX ADMIN — ATORVASTATIN CALCIUM 80 MG: 40 TABLET, FILM COATED ORAL at 09:09

## 2019-10-02 RX ADMIN — ARIPIPRAZOLE 20 MG: 5 TABLET ORAL at 08:42

## 2019-10-02 RX ADMIN — ACETAMINOPHEN 650 MG: 325 TABLET ORAL at 08:17

## 2019-10-02 RX ADMIN — BENZTROPINE MESYLATE 1 MG: 1 TABLET ORAL at 20:34

## 2019-10-02 RX ADMIN — LEVOTHYROXINE SODIUM 75 MCG: 75 TABLET ORAL at 08:16

## 2019-10-02 RX ADMIN — FAMOTIDINE 20 MG: 20 TABLET ORAL at 20:34

## 2019-10-02 RX ADMIN — DILTIAZEM HYDROCHLORIDE 30 MG: 30 TABLET, FILM COATED ORAL at 23:15

## 2019-10-02 RX ADMIN — TRAMADOL HYDROCHLORIDE 50 MG: 50 TABLET, COATED ORAL at 05:37

## 2019-10-02 RX ADMIN — BENZTROPINE MESYLATE 1 MG: 1 TABLET ORAL at 08:42

## 2019-10-02 ASSESSMENT — PAIN SCALES - GENERAL
PAINLEVEL_OUTOF10: 7
PAINLEVEL_OUTOF10: 4
PAINLEVEL_OUTOF10: 5
PAINLEVEL_OUTOF10: 6

## 2019-10-02 ASSESSMENT — PAIN DESCRIPTION - LOCATION
LOCATION: HIP
LOCATION: BACK;HIP

## 2019-10-02 ASSESSMENT — PAIN DESCRIPTION - PAIN TYPE
TYPE: CHRONIC PAIN
TYPE: CHRONIC PAIN

## 2019-10-02 ASSESSMENT — PAIN DESCRIPTION - ORIENTATION
ORIENTATION: LEFT
ORIENTATION: LEFT

## 2019-10-03 LAB
ANION GAP SERPL CALCULATED.3IONS-SCNC: 12 MMOL/L (ref 4–16)
BUN BLDV-MCNC: 18 MG/DL (ref 6–23)
CALCIUM SERPL-MCNC: 9.7 MG/DL (ref 8.3–10.6)
CHLORIDE BLD-SCNC: 104 MMOL/L (ref 99–110)
CO2: 27 MMOL/L (ref 21–32)
CREAT SERPL-MCNC: 1.3 MG/DL (ref 0.9–1.3)
GFR AFRICAN AMERICAN: >60 ML/MIN/1.73M2
GFR NON-AFRICAN AMERICAN: 56 ML/MIN/1.73M2
GLUCOSE BLD-MCNC: 94 MG/DL (ref 70–99)
INR BLD: 1.7 INDEX
POTASSIUM SERPL-SCNC: 4.4 MMOL/L (ref 3.5–5.1)
PROTHROMBIN TIME: 19.5 SECONDS (ref 9.12–12.5)
SODIUM BLD-SCNC: 143 MMOL/L (ref 135–145)

## 2019-10-03 PROCEDURE — 6360000002 HC RX W HCPCS: Performed by: PHYSICIAN ASSISTANT

## 2019-10-03 PROCEDURE — 0JPT0PZ REMOVAL OF CARDIAC RHYTHM RELATED DEVICE FROM TRUNK SUBCUTANEOUS TISSUE AND FASCIA, OPEN APPROACH: ICD-10-PCS | Performed by: INTERNAL MEDICINE

## 2019-10-03 PROCEDURE — 2500000003 HC RX 250 WO HCPCS

## 2019-10-03 PROCEDURE — 2580000003 HC RX 258

## 2019-10-03 PROCEDURE — 2720000010 HC SURG SUPPLY STERILE

## 2019-10-03 PROCEDURE — 33264 RMVL & RPLCMT DFB GEN MLT LD: CPT

## 2019-10-03 PROCEDURE — C1882 AICD, OTHER THAN SING/DUAL: HCPCS

## 2019-10-03 PROCEDURE — 2709999900 HC NON-CHARGEABLE SUPPLY

## 2019-10-03 PROCEDURE — 85610 PROTHROMBIN TIME: CPT

## 2019-10-03 PROCEDURE — 2140000000 HC CCU INTERMEDIATE R&B

## 2019-10-03 PROCEDURE — 2580000003 HC RX 258: Performed by: INTERNAL MEDICINE

## 2019-10-03 PROCEDURE — 6360000002 HC RX W HCPCS

## 2019-10-03 PROCEDURE — 0JH608Z INSERTION OF DEFIBRILLATOR GENERATOR INTO CHEST SUBCUTANEOUS TISSUE AND FASCIA, OPEN APPROACH: ICD-10-PCS | Performed by: INTERNAL MEDICINE

## 2019-10-03 PROCEDURE — 6370000000 HC RX 637 (ALT 250 FOR IP): Performed by: NURSE PRACTITIONER

## 2019-10-03 PROCEDURE — 33264 RMVL & RPLCMT DFB GEN MLT LD: CPT | Performed by: INTERNAL MEDICINE

## 2019-10-03 PROCEDURE — 94761 N-INVAS EAR/PLS OXIMETRY MLT: CPT

## 2019-10-03 PROCEDURE — 36415 COLL VENOUS BLD VENIPUNCTURE: CPT

## 2019-10-03 PROCEDURE — 33225 L VENTRIC PACING LEAD ADD-ON: CPT

## 2019-10-03 PROCEDURE — 80048 BASIC METABOLIC PNL TOTAL CA: CPT

## 2019-10-03 PROCEDURE — 33249 INSJ/RPLCMT DEFIB W/LEAD(S): CPT

## 2019-10-03 RX ORDER — LORAZEPAM 2 MG/ML
1 INJECTION INTRAMUSCULAR EVERY 6 HOURS PRN
Status: DISCONTINUED | OUTPATIENT
Start: 2019-10-03 | End: 2019-10-04 | Stop reason: HOSPADM

## 2019-10-03 RX ADMIN — Medication 10 ML: at 09:45

## 2019-10-03 RX ADMIN — FAMOTIDINE 20 MG: 20 TABLET ORAL at 09:44

## 2019-10-03 RX ADMIN — LEVOTHYROXINE SODIUM 75 MCG: 75 TABLET ORAL at 06:52

## 2019-10-03 RX ADMIN — LORAZEPAM 1 MG: 2 INJECTION INTRAMUSCULAR; INTRAVENOUS at 23:59

## 2019-10-03 RX ADMIN — Medication 10 ML: at 06:53

## 2019-10-03 RX ADMIN — FAMOTIDINE 20 MG: 20 TABLET ORAL at 22:07

## 2019-10-03 RX ADMIN — ARIPIPRAZOLE 20 MG: 5 TABLET ORAL at 09:43

## 2019-10-03 RX ADMIN — DILTIAZEM HYDROCHLORIDE 30 MG: 30 TABLET, FILM COATED ORAL at 22:07

## 2019-10-03 RX ADMIN — CETIRIZINE HYDROCHLORIDE 10 MG: 5 TABLET ORAL at 09:44

## 2019-10-03 RX ADMIN — DILTIAZEM HYDROCHLORIDE 30 MG: 30 TABLET, FILM COATED ORAL at 06:52

## 2019-10-03 RX ADMIN — ATORVASTATIN CALCIUM 80 MG: 40 TABLET, FILM COATED ORAL at 09:42

## 2019-10-03 RX ADMIN — BENZTROPINE MESYLATE 1 MG: 1 TABLET ORAL at 09:43

## 2019-10-03 RX ADMIN — SODIUM CHLORIDE: 9 INJECTION, SOLUTION INTRAVENOUS at 06:52

## 2019-10-03 RX ADMIN — DILTIAZEM HYDROCHLORIDE 30 MG: 30 TABLET, FILM COATED ORAL at 15:58

## 2019-10-03 RX ADMIN — BENZTROPINE MESYLATE 1 MG: 1 TABLET ORAL at 22:06

## 2019-10-03 ASSESSMENT — PAIN SCALES - GENERAL
PAINLEVEL_OUTOF10: 0

## 2019-10-04 ENCOUNTER — APPOINTMENT (OUTPATIENT)
Dept: GENERAL RADIOLOGY | Age: 64
DRG: 245 | End: 2019-10-04
Payer: MEDICARE

## 2019-10-04 VITALS
OXYGEN SATURATION: 99 % | DIASTOLIC BLOOD PRESSURE: 79 MMHG | TEMPERATURE: 97.6 F | RESPIRATION RATE: 19 BRPM | HEIGHT: 76 IN | HEART RATE: 73 BPM | SYSTOLIC BLOOD PRESSURE: 141 MMHG | BODY MASS INDEX: 24.67 KG/M2 | WEIGHT: 202.6 LBS

## 2019-10-04 PROBLEM — T82.120A DISPLACEMENT OF ELECTRODE LEAD OF CARDIAC PACEMAKER: Status: ACTIVE | Noted: 2019-10-04

## 2019-10-04 PROBLEM — T82.118A ICD (IMPLANTABLE CARDIOVERTER-DEFIBRILLATOR) MALFUNCTION: Status: RESOLVED | Noted: 2019-09-30 | Resolved: 2019-10-04

## 2019-10-04 LAB
ANION GAP SERPL CALCULATED.3IONS-SCNC: 10 MMOL/L (ref 4–16)
BUN BLDV-MCNC: 24 MG/DL (ref 6–23)
CALCIUM SERPL-MCNC: 8.7 MG/DL (ref 8.3–10.6)
CHLORIDE BLD-SCNC: 109 MMOL/L (ref 99–110)
CO2: 23 MMOL/L (ref 21–32)
CREAT SERPL-MCNC: 1.3 MG/DL (ref 0.9–1.3)
GFR AFRICAN AMERICAN: >60 ML/MIN/1.73M2
GFR NON-AFRICAN AMERICAN: 56 ML/MIN/1.73M2
GLUCOSE BLD-MCNC: 118 MG/DL (ref 70–99)
INR BLD: 1.68 INDEX
POTASSIUM SERPL-SCNC: 4.4 MMOL/L (ref 3.5–5.1)
PROTHROMBIN TIME: 19.6 SECONDS (ref 9.12–12.5)
SODIUM BLD-SCNC: 142 MMOL/L (ref 135–145)

## 2019-10-04 PROCEDURE — 85610 PROTHROMBIN TIME: CPT

## 2019-10-04 PROCEDURE — 36415 COLL VENOUS BLD VENIPUNCTURE: CPT

## 2019-10-04 PROCEDURE — 6370000000 HC RX 637 (ALT 250 FOR IP): Performed by: NURSE PRACTITIONER

## 2019-10-04 PROCEDURE — 99024 POSTOP FOLLOW-UP VISIT: CPT | Performed by: INTERNAL MEDICINE

## 2019-10-04 PROCEDURE — 71045 X-RAY EXAM CHEST 1 VIEW: CPT

## 2019-10-04 PROCEDURE — 02WA3MZ REVISION OF CARDIAC LEAD IN HEART, PERCUTANEOUS APPROACH: ICD-10-PCS | Performed by: INTERNAL MEDICINE

## 2019-10-04 PROCEDURE — 6370000000 HC RX 637 (ALT 250 FOR IP): Performed by: FAMILY MEDICINE

## 2019-10-04 PROCEDURE — 33215 REPOSITION PACING-DEFIB LEAD: CPT | Performed by: INTERNAL MEDICINE

## 2019-10-04 PROCEDURE — 80048 BASIC METABOLIC PNL TOTAL CA: CPT

## 2019-10-04 PROCEDURE — 2580000003 HC RX 258: Performed by: INTERNAL MEDICINE

## 2019-10-04 PROCEDURE — 2709999900 HC NON-CHARGEABLE SUPPLY

## 2019-10-04 PROCEDURE — 2500000003 HC RX 250 WO HCPCS

## 2019-10-04 PROCEDURE — 33215 REPOSITION PACING-DEFIB LEAD: CPT

## 2019-10-04 PROCEDURE — 2720000010 HC SURG SUPPLY STERILE

## 2019-10-04 PROCEDURE — 6360000002 HC RX W HCPCS

## 2019-10-04 PROCEDURE — 2580000003 HC RX 258: Performed by: NURSE PRACTITIONER

## 2019-10-04 PROCEDURE — 6360000002 HC RX W HCPCS: Performed by: PHYSICIAN ASSISTANT

## 2019-10-04 PROCEDURE — 6360000002 HC RX W HCPCS: Performed by: INTERNAL MEDICINE

## 2019-10-04 RX ORDER — VANCOMYCIN HYDROCHLORIDE 1 G/200ML
1000 INJECTION, SOLUTION INTRAVENOUS ONCE
Status: COMPLETED | OUTPATIENT
Start: 2019-10-04 | End: 2019-10-04

## 2019-10-04 RX ORDER — LORAZEPAM 2 MG/ML
1 INJECTION INTRAMUSCULAR ONCE
Status: COMPLETED | OUTPATIENT
Start: 2019-10-04 | End: 2019-10-04

## 2019-10-04 RX ADMIN — FAMOTIDINE 20 MG: 20 TABLET ORAL at 10:50

## 2019-10-04 RX ADMIN — LEVOTHYROXINE SODIUM 75 MCG: 75 TABLET ORAL at 06:56

## 2019-10-04 RX ADMIN — ARIPIPRAZOLE 20 MG: 5 TABLET ORAL at 10:49

## 2019-10-04 RX ADMIN — ACETAMINOPHEN 650 MG: 325 TABLET ORAL at 13:54

## 2019-10-04 RX ADMIN — VANCOMYCIN HYDROCHLORIDE 1000 MG: 1 INJECTION, SOLUTION INTRAVENOUS at 11:05

## 2019-10-04 RX ADMIN — CETIRIZINE HYDROCHLORIDE 10 MG: 5 TABLET ORAL at 10:50

## 2019-10-04 RX ADMIN — TRAMADOL HYDROCHLORIDE 50 MG: 50 TABLET, COATED ORAL at 15:03

## 2019-10-04 RX ADMIN — ATORVASTATIN CALCIUM 80 MG: 40 TABLET, FILM COATED ORAL at 10:50

## 2019-10-04 RX ADMIN — Medication 10 ML: at 10:50

## 2019-10-04 RX ADMIN — DILTIAZEM HYDROCHLORIDE 30 MG: 30 TABLET, FILM COATED ORAL at 13:55

## 2019-10-04 RX ADMIN — LORAZEPAM 1 MG: 2 INJECTION INTRAMUSCULAR; INTRAVENOUS at 06:01

## 2019-10-04 RX ADMIN — DILTIAZEM HYDROCHLORIDE 30 MG: 30 TABLET, FILM COATED ORAL at 06:56

## 2019-10-04 RX ADMIN — Medication 10 ML: at 10:54

## 2019-10-04 RX ADMIN — LORAZEPAM 1 MG: 2 INJECTION, SOLUTION INTRAMUSCULAR; INTRAVENOUS at 07:12

## 2019-10-04 RX ADMIN — SODIUM CHLORIDE: 9 INJECTION, SOLUTION INTRAVENOUS at 03:41

## 2019-10-04 RX ADMIN — BENZTROPINE MESYLATE 1 MG: 1 TABLET ORAL at 10:50

## 2019-10-04 ASSESSMENT — PAIN DESCRIPTION - FREQUENCY
FREQUENCY: CONTINUOUS
FREQUENCY: INTERMITTENT

## 2019-10-04 ASSESSMENT — PAIN DESCRIPTION - DESCRIPTORS
DESCRIPTORS: SORE
DESCRIPTORS: SORE

## 2019-10-04 ASSESSMENT — PAIN SCALES - GENERAL
PAINLEVEL_OUTOF10: 0
PAINLEVEL_OUTOF10: 4
PAINLEVEL_OUTOF10: 7
PAINLEVEL_OUTOF10: 0
PAINLEVEL_OUTOF10: 6

## 2019-10-04 ASSESSMENT — PAIN DESCRIPTION - PROGRESSION
CLINICAL_PROGRESSION: GRADUALLY IMPROVING
CLINICAL_PROGRESSION: GRADUALLY WORSENING
CLINICAL_PROGRESSION: GRADUALLY WORSENING

## 2019-10-04 ASSESSMENT — PAIN DESCRIPTION - ORIENTATION
ORIENTATION: LEFT;UPPER
ORIENTATION: LEFT

## 2019-10-04 ASSESSMENT — PAIN DESCRIPTION - LOCATION
LOCATION: CHEST
LOCATION: CHEST

## 2019-10-04 ASSESSMENT — PAIN DESCRIPTION - ONSET: ONSET: ON-GOING

## 2019-10-04 ASSESSMENT — PAIN - FUNCTIONAL ASSESSMENT: PAIN_FUNCTIONAL_ASSESSMENT: PREVENTS OR INTERFERES SOME ACTIVE ACTIVITIES AND ADLS

## 2019-10-04 ASSESSMENT — PAIN DESCRIPTION - PAIN TYPE
TYPE: SURGICAL PAIN

## 2019-10-15 ENCOUNTER — PROCEDURE VISIT (OUTPATIENT)
Dept: CARDIOLOGY CLINIC | Age: 64
End: 2019-10-15

## 2019-10-15 DIAGNOSIS — Z95.810 STATUS POST IMPLANTATION OF AUTOMATIC CARDIOVERTER/DEFIBRILLATOR (AICD): Primary | ICD-10-CM

## 2019-10-15 PROCEDURE — 99024 POSTOP FOLLOW-UP VISIT: CPT | Performed by: INTERNAL MEDICINE

## 2019-12-04 ENCOUNTER — OFFICE VISIT (OUTPATIENT)
Dept: CARDIOLOGY CLINIC | Age: 64
End: 2019-12-04
Payer: MEDICARE

## 2019-12-04 VITALS
HEIGHT: 76 IN | DIASTOLIC BLOOD PRESSURE: 64 MMHG | WEIGHT: 196 LBS | BODY MASS INDEX: 23.87 KG/M2 | SYSTOLIC BLOOD PRESSURE: 120 MMHG | HEART RATE: 79 BPM

## 2019-12-04 DIAGNOSIS — Z98.61 S/P PTCA (PERCUTANEOUS TRANSLUMINAL CORONARY ANGIOPLASTY): Chronic | ICD-10-CM

## 2019-12-04 DIAGNOSIS — Z95.810 BIVENTRICULAR ICD (IMPLANTABLE CARDIOVERTER-DEFIBRILLATOR) IN PLACE: Primary | ICD-10-CM

## 2019-12-04 DIAGNOSIS — I48.0 PAF (PAROXYSMAL ATRIAL FIBRILLATION) (HCC): Chronic | ICD-10-CM

## 2019-12-04 DIAGNOSIS — E78.2 MIXED HYPERLIPIDEMIA: ICD-10-CM

## 2019-12-04 PROBLEM — Z45.02 ICD (IMPLANTABLE CARDIOVERTER-DEFIBRILLATOR) BATTERY DEPLETION: Status: RESOLVED | Noted: 2019-10-01 | Resolved: 2019-12-04

## 2019-12-04 PROCEDURE — G8427 DOCREV CUR MEDS BY ELIG CLIN: HCPCS | Performed by: INTERNAL MEDICINE

## 2019-12-04 PROCEDURE — G8420 CALC BMI NORM PARAMETERS: HCPCS | Performed by: INTERNAL MEDICINE

## 2019-12-04 PROCEDURE — G8484 FLU IMMUNIZE NO ADMIN: HCPCS | Performed by: INTERNAL MEDICINE

## 2019-12-04 PROCEDURE — G8598 ASA/ANTIPLAT THER USED: HCPCS | Performed by: INTERNAL MEDICINE

## 2019-12-04 PROCEDURE — 93284 PRGRMG EVAL IMPLANTABLE DFB: CPT | Performed by: INTERNAL MEDICINE

## 2019-12-04 PROCEDURE — 1036F TOBACCO NON-USER: CPT | Performed by: INTERNAL MEDICINE

## 2019-12-04 PROCEDURE — 99213 OFFICE O/P EST LOW 20 MIN: CPT | Performed by: INTERNAL MEDICINE

## 2019-12-04 PROCEDURE — 3017F COLORECTAL CA SCREEN DOC REV: CPT | Performed by: INTERNAL MEDICINE

## 2019-12-06 ENCOUNTER — TELEPHONE (OUTPATIENT)
Dept: CARDIOLOGY CLINIC | Age: 64
End: 2019-12-06

## 2019-12-18 ENCOUNTER — TELEPHONE (OUTPATIENT)
Dept: CARDIOLOGY CLINIC | Age: 64
End: 2019-12-18

## 2020-01-15 ENCOUNTER — HOSPITAL ENCOUNTER (OUTPATIENT)
Age: 65
Setting detail: OBSERVATION
Discharge: LEFT BEFORE DISCHARGE | End: 2020-01-16
Attending: EMERGENCY MEDICINE | Admitting: INTERNAL MEDICINE
Payer: MEDICARE

## 2020-01-15 ENCOUNTER — APPOINTMENT (OUTPATIENT)
Dept: GENERAL RADIOLOGY | Age: 65
End: 2020-01-15
Payer: MEDICARE

## 2020-01-15 VITALS
RESPIRATION RATE: 16 BRPM | TEMPERATURE: 98.3 F | OXYGEN SATURATION: 96 % | WEIGHT: 202 LBS | HEART RATE: 81 BPM | BODY MASS INDEX: 24.6 KG/M2 | DIASTOLIC BLOOD PRESSURE: 78 MMHG | HEIGHT: 76 IN | SYSTOLIC BLOOD PRESSURE: 143 MMHG

## 2020-01-15 LAB
ANION GAP SERPL CALCULATED.3IONS-SCNC: 9 MMOL/L (ref 4–16)
BASOPHILS ABSOLUTE: 0.1 K/CU MM
BASOPHILS RELATIVE PERCENT: 1 % (ref 0–1)
BUN BLDV-MCNC: 20 MG/DL (ref 6–23)
CALCIUM SERPL-MCNC: 9.6 MG/DL (ref 8.3–10.6)
CHLORIDE BLD-SCNC: 104 MMOL/L (ref 99–110)
CO2: 27 MMOL/L (ref 21–32)
CREAT SERPL-MCNC: 1.4 MG/DL (ref 0.9–1.3)
DIFFERENTIAL TYPE: ABNORMAL
EOSINOPHILS ABSOLUTE: 0.8 K/CU MM
EOSINOPHILS RELATIVE PERCENT: 10.9 % (ref 0–3)
GFR AFRICAN AMERICAN: >60 ML/MIN/1.73M2
GFR NON-AFRICAN AMERICAN: 51 ML/MIN/1.73M2
GLUCOSE BLD-MCNC: 93 MG/DL (ref 70–99)
HCT VFR BLD CALC: 39.6 % (ref 42–52)
HEMOGLOBIN: 12.5 GM/DL (ref 13.5–18)
IMMATURE NEUTROPHIL %: 0.1 % (ref 0–0.43)
INR BLD: 1.08 INDEX
LYMPHOCYTES ABSOLUTE: 2.7 K/CU MM
LYMPHOCYTES RELATIVE PERCENT: 37.1 % (ref 24–44)
MCH RBC QN AUTO: 30.3 PG (ref 27–31)
MCHC RBC AUTO-ENTMCNC: 31.6 % (ref 32–36)
MCV RBC AUTO: 96.1 FL (ref 78–100)
MONOCYTES ABSOLUTE: 0.8 K/CU MM
MONOCYTES RELATIVE PERCENT: 11.6 % (ref 0–4)
NUCLEATED RBC %: 0 %
PDW BLD-RTO: 14.3 % (ref 11.7–14.9)
PLATELET # BLD: 194 K/CU MM (ref 140–440)
PMV BLD AUTO: 10.2 FL (ref 7.5–11.1)
POTASSIUM SERPL-SCNC: 4.9 MMOL/L (ref 3.5–5.1)
PROTHROMBIN TIME: 13.1 SECONDS (ref 11.7–14.5)
RBC # BLD: 4.12 M/CU MM (ref 4.6–6.2)
SEGMENTED NEUTROPHILS ABSOLUTE COUNT: 2.8 K/CU MM
SEGMENTED NEUTROPHILS RELATIVE PERCENT: 39.3 % (ref 36–66)
SODIUM BLD-SCNC: 140 MMOL/L (ref 135–145)
TOTAL IMMATURE NEUTOROPHIL: 0.01 K/CU MM
TOTAL NUCLEATED RBC: 0 K/CU MM
TROPONIN T: <0.01 NG/ML
WBC # BLD: 7.2 K/CU MM (ref 4–10.5)

## 2020-01-15 PROCEDURE — G0378 HOSPITAL OBSERVATION PER HR: HCPCS

## 2020-01-15 PROCEDURE — 80048 BASIC METABOLIC PNL TOTAL CA: CPT

## 2020-01-15 PROCEDURE — 99285 EMERGENCY DEPT VISIT HI MDM: CPT

## 2020-01-15 PROCEDURE — 84484 ASSAY OF TROPONIN QUANT: CPT

## 2020-01-15 PROCEDURE — 94761 N-INVAS EAR/PLS OXIMETRY MLT: CPT

## 2020-01-15 PROCEDURE — 96372 THER/PROPH/DIAG INJ SC/IM: CPT

## 2020-01-15 PROCEDURE — 71045 X-RAY EXAM CHEST 1 VIEW: CPT

## 2020-01-15 PROCEDURE — 99219 PR INITIAL OBSERVATION CARE/DAY 50 MINUTES: CPT | Performed by: INTERNAL MEDICINE

## 2020-01-15 PROCEDURE — 6370000000 HC RX 637 (ALT 250 FOR IP): Performed by: FAMILY MEDICINE

## 2020-01-15 PROCEDURE — 93005 ELECTROCARDIOGRAM TRACING: CPT | Performed by: EMERGENCY MEDICINE

## 2020-01-15 PROCEDURE — 93005 ELECTROCARDIOGRAM TRACING: CPT | Performed by: NURSE PRACTITIONER

## 2020-01-15 PROCEDURE — 85025 COMPLETE CBC W/AUTO DIFF WBC: CPT

## 2020-01-15 PROCEDURE — 6360000002 HC RX W HCPCS: Performed by: INTERNAL MEDICINE

## 2020-01-15 PROCEDURE — 6370000000 HC RX 637 (ALT 250 FOR IP): Performed by: INTERNAL MEDICINE

## 2020-01-15 PROCEDURE — 85610 PROTHROMBIN TIME: CPT

## 2020-01-15 PROCEDURE — 36415 COLL VENOUS BLD VENIPUNCTURE: CPT

## 2020-01-15 PROCEDURE — 6370000000 HC RX 637 (ALT 250 FOR IP): Performed by: EMERGENCY MEDICINE

## 2020-01-15 PROCEDURE — 2580000003 HC RX 258: Performed by: INTERNAL MEDICINE

## 2020-01-15 RX ORDER — ONDANSETRON 2 MG/ML
4 INJECTION INTRAMUSCULAR; INTRAVENOUS EVERY 6 HOURS PRN
Status: DISCONTINUED | OUTPATIENT
Start: 2020-01-15 | End: 2020-01-16 | Stop reason: HOSPADM

## 2020-01-15 RX ORDER — BENZTROPINE MESYLATE 1 MG/1
1 TABLET ORAL 2 TIMES DAILY
Status: DISCONTINUED | OUTPATIENT
Start: 2020-01-15 | End: 2020-01-16 | Stop reason: HOSPADM

## 2020-01-15 RX ORDER — ARIPIPRAZOLE 10 MG/1
30 TABLET ORAL EVERY MORNING
Status: DISCONTINUED | OUTPATIENT
Start: 2020-01-15 | End: 2020-01-16 | Stop reason: HOSPADM

## 2020-01-15 RX ORDER — ATORVASTATIN CALCIUM 40 MG/1
80 TABLET, FILM COATED ORAL NIGHTLY
Status: DISCONTINUED | OUTPATIENT
Start: 2020-01-15 | End: 2020-01-16 | Stop reason: HOSPADM

## 2020-01-15 RX ORDER — NITROGLYCERIN 0.4 MG/1
0.4 TABLET SUBLINGUAL ONCE
Status: DISCONTINUED | OUTPATIENT
Start: 2020-01-15 | End: 2020-01-15

## 2020-01-15 RX ORDER — ASPIRIN 81 MG/1
81 TABLET, CHEWABLE ORAL DAILY
Status: DISCONTINUED | OUTPATIENT
Start: 2020-01-16 | End: 2020-01-16 | Stop reason: HOSPADM

## 2020-01-15 RX ORDER — EZETIMIBE 10 MG/1
10 TABLET ORAL DAILY
Status: DISCONTINUED | OUTPATIENT
Start: 2020-01-15 | End: 2020-01-16 | Stop reason: HOSPADM

## 2020-01-15 RX ORDER — SODIUM CHLORIDE 0.9 % (FLUSH) 0.9 %
10 SYRINGE (ML) INJECTION PRN
Status: DISCONTINUED | OUTPATIENT
Start: 2020-01-15 | End: 2020-01-16 | Stop reason: HOSPADM

## 2020-01-15 RX ORDER — SODIUM CHLORIDE 0.9 % (FLUSH) 0.9 %
10 SYRINGE (ML) INJECTION EVERY 12 HOURS SCHEDULED
Status: DISCONTINUED | OUTPATIENT
Start: 2020-01-15 | End: 2020-01-16 | Stop reason: HOSPADM

## 2020-01-15 RX ORDER — TRAMADOL HYDROCHLORIDE 50 MG/1
50 TABLET ORAL EVERY 6 HOURS PRN
Status: DISCONTINUED | OUTPATIENT
Start: 2020-01-15 | End: 2020-01-16 | Stop reason: HOSPADM

## 2020-01-15 RX ORDER — LORAZEPAM 2 MG/ML
0.5 INJECTION INTRAMUSCULAR
Status: ACTIVE | OUTPATIENT
Start: 2020-01-15 | End: 2020-01-15

## 2020-01-15 RX ORDER — LEVOTHYROXINE SODIUM 0.07 MG/1
75 TABLET ORAL DAILY
Status: DISCONTINUED | OUTPATIENT
Start: 2020-01-15 | End: 2020-01-16 | Stop reason: HOSPADM

## 2020-01-15 RX ADMIN — NITROGLYCERIN 0.5 INCH: 20 OINTMENT TOPICAL at 02:17

## 2020-01-15 RX ADMIN — SODIUM CHLORIDE, PRESERVATIVE FREE 10 ML: 5 INJECTION INTRAVENOUS at 22:49

## 2020-01-15 RX ADMIN — TRAMADOL HYDROCHLORIDE 50 MG: 50 TABLET, FILM COATED ORAL at 14:48

## 2020-01-15 RX ADMIN — DILTIAZEM HYDROCHLORIDE 30 MG: 30 TABLET, FILM COATED ORAL at 22:48

## 2020-01-15 RX ADMIN — BENZTROPINE MESYLATE 1 MG: 1 TABLET ORAL at 22:48

## 2020-01-15 RX ADMIN — SODIUM CHLORIDE, PRESERVATIVE FREE 10 ML: 5 INJECTION INTRAVENOUS at 09:16

## 2020-01-15 RX ADMIN — ARIPIPRAZOLE 30 MG: 10 TABLET ORAL at 09:16

## 2020-01-15 RX ADMIN — ENOXAPARIN SODIUM 40 MG: 40 INJECTION SUBCUTANEOUS at 09:16

## 2020-01-15 RX ADMIN — LEVOTHYROXINE SODIUM 75 MCG: 75 TABLET ORAL at 06:44

## 2020-01-15 RX ADMIN — BENZTROPINE MESYLATE 1 MG: 1 TABLET ORAL at 09:16

## 2020-01-15 RX ADMIN — TRAMADOL HYDROCHLORIDE 50 MG: 50 TABLET, FILM COATED ORAL at 22:48

## 2020-01-15 RX ADMIN — DILTIAZEM HYDROCHLORIDE 30 MG: 30 TABLET, FILM COATED ORAL at 14:48

## 2020-01-15 RX ADMIN — DILTIAZEM HYDROCHLORIDE 30 MG: 30 TABLET, FILM COATED ORAL at 06:44

## 2020-01-15 RX ADMIN — ATORVASTATIN CALCIUM 80 MG: 40 TABLET, FILM COATED ORAL at 22:48

## 2020-01-15 ASSESSMENT — PAIN DESCRIPTION - ORIENTATION
ORIENTATION: LEFT
ORIENTATION: LEFT

## 2020-01-15 ASSESSMENT — PAIN DESCRIPTION - PROGRESSION
CLINICAL_PROGRESSION: GRADUALLY IMPROVING

## 2020-01-15 ASSESSMENT — PAIN SCALES - GENERAL
PAINLEVEL_OUTOF10: 0
PAINLEVEL_OUTOF10: 3
PAINLEVEL_OUTOF10: 8
PAINLEVEL_OUTOF10: 7
PAINLEVEL_OUTOF10: 8
PAINLEVEL_OUTOF10: 8
PAINLEVEL_OUTOF10: 4
PAINLEVEL_OUTOF10: 7

## 2020-01-15 ASSESSMENT — PAIN DESCRIPTION - LOCATION
LOCATION: HIP
LOCATION: CHEST

## 2020-01-15 ASSESSMENT — PAIN DESCRIPTION - PAIN TYPE
TYPE: CHRONIC PAIN
TYPE: CHRONIC PAIN
TYPE: ACUTE PAIN
TYPE: CHRONIC PAIN

## 2020-01-15 ASSESSMENT — PAIN DESCRIPTION - DESCRIPTORS
DESCRIPTORS: CONSTANT;SHARP;SHOOTING
DESCRIPTORS: CONSTANT

## 2020-01-15 ASSESSMENT — PAIN DESCRIPTION - ONSET
ONSET: SUDDEN
ONSET: ON-GOING
ONSET: ON-GOING

## 2020-01-15 ASSESSMENT — PAIN - FUNCTIONAL ASSESSMENT: PAIN_FUNCTIONAL_ASSESSMENT: PREVENTS OR INTERFERES SOME ACTIVE ACTIVITIES AND ADLS

## 2020-01-15 ASSESSMENT — PAIN DESCRIPTION - FREQUENCY
FREQUENCY: CONTINUOUS
FREQUENCY: CONTINUOUS

## 2020-01-15 NOTE — ED NOTES
Bed: 01TR-01  Expected date: 1/15/20  Expected time: 1:26 AM  Means of arrival: [facility]  Comments:  ems     Lou Mitchell RN  01/15/20 0127

## 2020-01-15 NOTE — CONSULTS
Electrophysiology Consult Note      Reason for consultation:  LV thresholds increased    Chief complaint : Ches bettyin    Referring physician: Julia Tovar      Primary care physician: Safia Mercado MD      History of Present Illness:     Patient is a 82-year-old male with history of cardiomyopathy status post Biventricular ICD with recent generator change resented with chest pain -which was left-sided pressure kind of chest pain with radiation to the shoulder, 6 out of 10 in intensity -associated with shortness of breath, nausea, diaphoresis relieved with rest and nitroglycerin aggravated with exertion as patient reports. Patient also reports chest pain at the site of the ICD placement which has been ongoing since generator change.   Patient denies any dizziness or syncope    Pastmedical history:   Past Medical History:   Diagnosis Date    A-fib [facility])     on Coumadin    Abdominal pain 3/22/2012    Acute on chronic renal failure (HCC)     Allergic rhinitis     Bipolar affective disorder unspecified     possible schziophrenia per pt- Seeing Dr. Raegan Danielle of left shoulder 2007    s/p injection     CAD (coronary artery disease)     see Leonidas    Cardiomyopathy ([facility]     NYHA FC II-III    CHF (NYHA class III, ACC/AHA stage C) ([facility] 4/27/2012    Cholelithiasis 5/8/2012    per CT    Chronic kidney disease (CKD)     Chronic pain     on Ultram- dispensed at [facility] per CSP/psych program    Depression     Displacement of electrode lead of cardiac pacemaker 10/4/2019    10/4/2019 RV lead got pulled back in the connector    DJD (degenerative joint disease) of cervical spine     Elevated serum creatinine     Erectile dysfunction     Fractures     Left collar bone x8, left wrist, bilateral toes    GERD (gastroesophageal reflux disease)     GERD (gastroesophageal reflux disease)     H/O echocardiogram 03/26/15    EF 20% Mod dilated LV with severly reduced systolic function. pacer wire noted in right ventricle and right atrium.  Hiatal hernia 5/8/2012    per CT    History of nuclear stress test 05/01/2017    lexiscan-large anteroapical MI, no new changes, EF16%    Hypertension     Hypothyroid     Started by Dr. Greta Adams Ischemic heart disease     Lipidemia 4/27/2012    Lithium toxicity     4/2015    MI, old 4/27/2012    Paronychia 3/12/2012    S/P cardiac cath 2/21/14    Schizoaffective disorder [facility])     old chart also gives hx of paranoid schizo    Unintentional weight loss 3/22/2012    Possible due to bipolar/tabby/ paranoia (sept 2012) with refusing to eat food from the grocery store believing it's being poisoned        Surgical history :   Past Surgical History:   Procedure Laterality Date    ANGIOPLASTY      per old chart pt had angioplasty (LAD) with cutting balloon and arthrectomy done 7/2010    CARDIAC CATHETERIZATION      per old chart pt had cardiac cath 12/2010, 2/2011,4/2012, and 2/2014    CARDIAC SURGERY      arteriogram; stent    CHOLECYSTECTOMY      ENDOSCOPY, COLON, DIAGNOSTIC  9/2015    GASTRIC FUNDOPLICATION  25/25/3965    Robotic laparoscopic assisted nissen, cholecystectomy    HIATAL HERNIA REPAIR  10/7/15    PACEMAKER PLACEMENT      per old chart pt had biV ICD inserted 1/2011 and then 5/2014 had left ventricular lead replacement done    SINUS SURGERY      with allergy testing done in the past- Dr. Gino Edwards (([address] [place] [address])    TONSILLECTOMY         Family history:   Family History   Problem Relation Age of Onset    Mental Illness Mother         substance abuse    Mental Illness Sister     Mental Illness Sister        Social history :  reports that he has quit smoking. His smoking use included cigarettes. He has a 23.00 pack-year smoking history. He has never used smokeless tobacco. He reports that he does not drink alcohol or use drugs.     Allergies   Allergen Reactions    Penicillins Swelling    Azithromycin     Clozapine [Clozapine] Swelling    Haldol [Haloperidol Lactate]     Insulins      Per ecf    Ketorolac Tromethamine     Niacin And Related     Ultram [Tramadol]     Zomig [Zolmitriptan]     Codeine Nausea And Vomiting    Depakote [Divalproex Sodium] Other (See Comments)     ' makes eyeball fall out\"    Haldol [Haloperidol Lactate]      Hand and leg tremors with palpitation    Hydrocodone Nausea And Vomiting    Ibuprofen Anxiety     Causes pt to pass out    Imitrex [Sumatriptan] Anxiety    Maxalt [Rizatriptan] Nausea And Vomiting     Makes HA worse    Neurontin [Gabapentin] Other (See Comments)     \" makes eyeballs shrink\"       No current facility-administered medications on file prior to encounter. Current Outpatient Medications on File Prior to Encounter   Medication Sig Dispense Refill    diltiazem (CARDIZEM) 30 MG tablet Take 1 tablet by mouth every 8 hours 120 tablet 3    warfarin (COUMADIN) 2.5 MG tablet Take 2.5 mg by mouth Daily with supper      ARIPiprazole (ABILIFY) 30 MG tablet Take 30 mg by mouth every morning       ezetimibe (ZETIA) 10 MG tablet Take 10 mg by mouth daily      atorvastatin (LIPITOR) 80 MG tablet Take 1 tablet by mouth daily 90 tablet 3    nitroGLYCERIN (NITROSTAT) 0.3 MG SL tablet Place 0.3 mg under the tongue every 5 minutes as needed for Chest pain      levothyroxine (SYNTHROID) 75 MCG tablet Take 75 mcg by mouth Daily.  benztropine (COGENTIN) 1 MG tablet Take 1 mg by mouth 2 times daily       loratadine (CLARITIN) 10 MG tablet Take 1 tablet by mouth daily. 90 tablet 3    acetaminophen (TYLENOL) 325 MG tablet Take 650 mg by mouth every 4 hours as needed for Pain         Review of Systems:   Review of Systems   Constitutional: Positive for fatigue. Negative for activity change, chills and fever. HENT: Negative for congestion, ear pain and tinnitus. Eyes: Negative for photophobia, pain and visual disturbance.    Respiratory: Positive for shortness of

## 2020-01-15 NOTE — PROGRESS NOTES
Dr. Trice Roy recommended patient have cardiac cath today. Patient told nursing staff that he is going to refuse cardiac cath. Notified Dr. Fermín Aranda, NP, and Dr. Tanvi Soria of patient's decision. Patient is unable to leave AMA d/t residing at [facility] and also having a legal guardian, Veda Houser at 14:00 to make her aware that patient is refusing cardiac cath and what she would like to do about his decision. Liana Gomez said that she is \"deferring the decision to the patient\" and \"will sign any paperwork for discharge that needs to be completed\" however, cardiology can call her if they want to discuss with her before patient discharges. Notified Dr. Trice Roy of Stephanie's decision to defer the option to the patient and provided doctor with her contact information. Also notified Dr. Tanvi Soria of the situation. Awaiting decision and possible discharge versus cardiac cath today.

## 2020-01-15 NOTE — ED PROVIDER NOTES
[facility]  eMERGENCYdEPARTMENT eNCOUnter      Pt Name: Maryanne Valenzuela  MRN: [id number]  Brittanygflouann 1955  Date of evaluation: 1/15/2020  Provider:Venu Willis MD    [address] [place] [address]       Chief Complaint   Patient presents with    Chest Pain         HISTORY OF PRESENT ILLNESS    Maryanne Valenzuela is a 60 y.o. male who presents to the emergency department with chest pain. Roughly an hour prior to arrival patient had sudden onset of chest pain that woke him up from sleep. It was constant, he noted that his defibrillator was firing frequently. His defibrillator was initially placed for atrial fibrillation. He says that his pain abated with nitroglycerin. Is currently 4 out of 10 in severity. Last catheterization was 2 years ago, he had a single stent. One stent prior to that. Nursing Notes were reviewed. REVIEW OF SYSTEMS       Review of Systems    10 point review of systems was performed and was negative exceptas specifically noted in the HPI.       PAST MEDICAL HISTORY     Past Medical History:   Diagnosis Date    A-fib [facility])     on Coumadin    Abdominal pain 3/22/2012    Acute on chronic renal failure (HCC)     Allergic rhinitis     Bipolar affective disorder unspecified     possible schziophrenia per pt- Seeing Dr. Domenico Kim of left shoulder 2007    s/p injection     CAD (coronary artery disease)     see Leonidas    Cardiomyopathy ([facility]     NYHA FC II-III    CHF (NYHA class III, ACC/AHA stage C) ([facility] 4/27/2012    Cholelithiasis 5/8/2012    per CT    Chronic kidney disease (CKD)     Chronic pain     on [facility]am[facility]ensed [facility] per CSP/psych program    Depression     Displacement of electrode lead of cardiac pacemaker 10/4/2019    10/4/2019 RV lead got pulled back in the connector    DJD (degenerative joint disease) of cervical spine     Elevated serum creatinine     Erectile dysfunction     Fractures Left collar bone x8, left wrist, bilateral toes    GERD (gastroesophageal reflux disease)     GERD (gastroesophageal reflux disease)     H/O echocardiogram 03/26/15    EF 20% Mod dilated LV with severly reduced systolic function. pacer wire noted in right ventricle and right atrium.      Hiatal hernia 5/8/2012    per CT    History of nuclear stress test 05/01/2017    lexiscan-large anteroapical MI, no new changes, EF16%    Hypertension     Hypothyroid     Started by Dr. Rohini Cardoza Ischemic heart disease     Lipidemia 4/27/2012    Lithium toxicity     4/2015    MI, old 4/27/2012    Paronychia 3/12/2012    S/P cardiac cath 2/21/14    Schizoaffective disorder [facility])     old chart also gives hx of paranoid schizo    Unintentional weight loss 3/22/2012    Possible due to bipolar/tabby/ paranoia (sept 2012) with refusing to eat food from the grocery store believing it's being poisoned          SURGICAL HISTORY       Past Surgical History:   Procedure Laterality Date    ANGIOPLASTY      per old chart pt had angioplasty (LAD) with cutting balloon and arthrectomy done 7/2010    CARDIAC CATHETERIZATION      per old chart pt had cardiac cath 12/2010, 2/2011,4/2012, and 2/2014    CARDIAC SURGERY      arteriogram; stent    CHOLECYSTECTOMY      ENDOSCOPY, COLON, DIAGNOSTIC  9/2015    GASTRIC FUNDOPLICATION  70/23/7241    Robotic laparoscopic assisted nissen, cholecystectomy    HIATAL HERNIA REPAIR  10/7/15    PACEMAKER PLACEMENT      per old chart pt had biV ICD inserted 1/2011 and then 5/2014 had left ventricular lead replacement done    SINUS SURGERY      with allergy testing done in the past- Dr. Deya Rajput (([place])    TONSILLECTOMY           CURRENT MEDICATIONS       Previous Medications    ACETAMINOPHEN (TYLENOL) 325 MG TABLET    Take 650 mg by mouth every 4 hours as needed for Pain    ARIPIPRAZOLE (ABILIFY) 30 MG TABLET    Take 30 mg by mouth every morning     ATORVASTATIN (LIPITOR) 80 MG TABLET None   Relationships    Social connections:     Talks on phone: None     Gets together: None     Attends Alevism service: None     Active member of club or organization: None     Attends meetings of clubs or organizations: None     Relationship status: None    Intimate partner violence:     Fear of current or ex partner: None     Emotionally abused: None     Physically abused: None     Forced sexual activity: None   Other Topics Concern    None   Social History Narrative    None       SCREENINGS   [email or web address] Coma Scale  Eye Opening: Spontaneous  Best Verbal Response: Oriented  Best Motor Response: Obeys commands  Arabella Coma Scale Score: 15        PHYSICAL EXAM       ED Triage Vitals [01/15/20 0129]   BP Temp Temp Source Pulse Resp SpO2 Height Weight   -- 98.4 °F (36.9 °C) Oral -- -- -- 6' 4.5\" (1.943 m) 201 lb 6.4 oz (91.4 kg)       Physical Exam  General appearance: Alert, cooperative, no distress, appears stated age. Head:  Normocephalic, without obvious abnormality, atraumatic. HEENT: Mucous membranes moist.  Neck: Full ROM, trachea midline, no JVD  Lungs: No respiratory distress, lung sounds clear  Cardiovasular: Perfusing extremities   Abdomen: Nontender, no guarding  Extremities: Atraumatic, full ROM  Skin: No rashes or lesions to exposed skin  Neurologic: Alert and oriented x3, motor grossly normal, clear speech      DIAGNOSTIC RESULTS     EKG: A-sensed V-paced    RADIOLOGY:   Non-plain film images such as CT, Ultrasound and MRI are read by the radiologist.Plain radiographic images are visualized and preliminarily interpreted by the emergency physician with the below findings:    No pna    Interpretation per the Radiologist below, if available at the time of this note:    XR CHEST PORTABLE   Final Result   No acute cardiopulmonary abnormality.                EDBEDSIDE ULTRASOUND:   Performed by Jael Jaramillo - [email or web address]    LABS:  Labs Reviewed   CBC WITH AUTO DIFFERENTIAL - Abnormal; Notable for the following components:       Result Value    RBC 4.12 (*)     Hemoglobin 12.5 (*)     Hematocrit 39.6 (*)     MCHC 31.6 (*)     Monocytes % 11.6 (*)     Eosinophils % 10.9 (*)     All other components within normal limits   BASIC METABOLIC PANEL - Abnormal; Notable for the following components:    CREATININE 1.4 (*)     GFR Non- 51 (*)     All other components within normal limits   TROPONIN       All other labs were within normal range or not returned as of this dictation. EMERGENCY DEPARTMENT COURSE and DIFFERENTIAL DIAGNOSIS/MDM:   Vitals:    Vitals:    01/15/20 0129 01/15/20 0130   BP:  (!) 152/72   Pulse:  74   Resp:  18   Temp: 98.4 °F (36.9 °C)    TempSrc: Oral    SpO2:  100%   Weight: 201 lb 6.4 oz (91.4 kg)    Height: 6' 4.5\" (1.943 m)        MDM  Patient presents chest pain. Presentation vital signs are stable. On examination the patient is in no acute distress, is breathing easily on room air. We interrogated the pacemaker, it does not show any shocks, no episodes of V. tach or V. fib. EKG nonischemic but limited by atrial sensed V paced rhythm. Opponent is negative. We will admit the patient for chest pain. He feels better with the Nitropaste on. REASSESSMENT          CRITICAL CARE TIME   Total Critical Care time was 0 minutes, excluding separately reportable procedures. There was a high probability of clinically significant/life threatening deteriorationin the patient's condition which required my urgent intervention. CONSULTS:  IP CONSULT TO HOSPITALIST     PROCEDURES:  Unless otherwise noted below, none     Procedures    FINAL IMPRESSION      1. Chest pain, unspecified type          DISPOSITION/PLAN   DISPOSITION Decision To Admit 01/15/2020 02:55:36 AM      PATIENT REFERRED TO:  No follow-up provider specified.     DISCHARGE MEDICATIONS:  New Prescriptions    No medications on file          (Please note that portions of this note were completed with a voicerecognition program.  Efforts were made to edit the dictations but occasionally words are mis-transcribed.)    Se Llanos MD (electronically signed)  Attending Emergency Physician            Se Llanos MD  01/15/20 3150

## 2020-01-15 NOTE — CARE COORDINATION
Chart reviewed. Pt is from [address] [place] [address]. Pt has legal guardian Mindy Adamson. Pt can return to [facility] no precert needed.      Electronically signed by MARCELLA Alatorre on 1/15/2020 at 3:23 PM

## 2020-01-15 NOTE — DISCHARGE INSTR - COC
{Prognosis:6764408650}    Condition at Discharge: 50Oleg Donnelly Patient Condition:012475113}    Rehab Potential (if transferring to Rehab): {Prognosis:8515744333}    Recommended Labs or Other Treatments After Discharge: ***    Physician Certification: I certify the above information and transfer of Radha France  is necessary for the continuing treatment of the diagnosis listed and that he requires {Admit to Appropriate Level of Care:92680} for {GREATER/LESS:777461882} 30 days.      Update Admission H&P: {CHP DME Changes in TPJQ}  Nutrition Therapy:  Current Nutrition Therapy:   50Oleg Donnelly DARY Diet List:071022479}    Routes of Feeding: {CHP DME Other Feedings:844146126}  Liquids: {Slp liquid thickness:25273}  Daily Fluid Restriction: {CHP DME Yes amt example:745875615}  Last Modified Barium Swallow with Video (Video Swallowing Test): {Done Not Done LGAO:360104922}    Treatments at the Time of Hospital Discharge:   Respiratory Treatments: ***  Oxygen Therapy:  {Therapy; copd oxygen:85475}  Ventilator:    {[facility] Vent PQUS:945564109}    Rehab Therapies: {THERAPEUTIC INTERVENTION:7307728711}  Weight Bearing Status/Restrictions: {[facility] Weight Bearin}  Other Medical Equipment (for information only, NOT a DME order):  {EQUIPMENT:123545719}  Other Treatments: ***      PHYSICIAN SIGNATURE:  {Esignature:926621790}

## 2020-01-15 NOTE — PROGRESS NOTES
Notified patient that guardian has approved cath for tomorrow. Patient agitated with decision and has called [facility] numerous times to Seth ride\" despite education about the role of the legal guardian and how patient is unable to leave AMA and transport will not accept AMA patient. Patient insistent that [facility] a ride coming to get me at 19:30\". Nursing called [facility] and spoke to Luis Angel Alfaro to see if ride had been arranged; a ride had not been arranged and patient will have to stay per guardian's decision.

## 2020-01-15 NOTE — CONSULTS
placement; Endoscopy, colon, diagnostic (9/2015); Gastric fundoplication (53/94/6938); hiatal hernia repair (10/7/15); and Cholecystectomy. Social History:   reports that he has quit smoking. His smoking use included cigarettes. He has a 23.00 pack-year smoking history. He has never used smokeless tobacco. He reports that he does not drink alcohol or use drugs.   Family history:   no family history of CAD, STROKE of DM    Allergies   Allergen Reactions    Penicillins Swelling    Azithromycin     Clozapine [Clozapine] Swelling    Haldol [Haloperidol Lactate]     Insulins      Per ecf    Ketorolac Tromethamine     Niacin And Related     Ultram [Tramadol]     Zomig [Zolmitriptan]     Codeine Nausea And Vomiting    Depakote [Divalproex Sodium] Other (See Comments)     ' makes eyeball fall out\"    Haldol [Haloperidol Lactate]      Hand and leg tremors with palpitation    Hydrocodone Nausea And Vomiting    Ibuprofen Anxiety     Causes pt to pass out    Imitrex [Sumatriptan] Anxiety    Maxalt [Rizatriptan] Nausea And Vomiting     Makes HA worse    Neurontin [Gabapentin] Other (See Comments)     \" makes eyeballs shrink\"       benztropine (COGENTIN) tablet 1 mg, BID  ezetimibe (ZETIA) tablet 10 mg, Daily  ARIPiprazole (ABILIFY) tablet 30 mg, QAM  diltiazem (CARDIZEM) tablet 30 mg, 3 times per day  levothyroxine (SYNTHROID) tablet 75 mcg, Daily  sodium chloride flush 0.9 % injection 10 mL, 2 times per day  sodium chloride flush 0.9 % injection 10 mL, PRN  magnesium hydroxide (MILK OF MAGNESIA) 400 MG/5ML suspension 30 mL, Daily PRN  ondansetron (ZOFRAN) injection 4 mg, Q6H PRN  [START ON 1/16/2020] aspirin chewable tablet 81 mg, Daily  atorvastatin (LIPITOR) tablet 80 mg, Nightly  [START ON 1/16/2020] enoxaparin (LOVENOX) injection 30 mg, Daily  traMADol (ULTRAM) tablet 50 mg, Q6H PRN      Current Facility-Administered Medications   Medication Dose Route Frequency Provider Last Rate Last Dose    benztropine temperature intolerance  · Hematologic/Lymphatic: No bleeding problems, blood clots or swollen lymph nodes  · Allergic/Immunologic: No nasal congestion or hives  All systems negative except as marked. ·   ·      Physical Examination:    Vitals:    01/15/20 0900   BP: 127/72   Pulse: 61   Resp: 18   Temp: 98 °F (36.7 °C)   SpO2: 98%      Wt Readings from Last 3 Encounters:   01/15/20 202 lb (91.6 kg)   12/04/19 196 lb (88.9 kg)   10/04/19 202 lb 9.6 oz (91.9 kg)     Body mass index is 24.59 kg/m². General Appearance:  No distress, conversant    Constitutional:  Well developed, Well nourished, No acute distress, Non-toxic appearance. HENT:  Normocephalic, Atraumatic, Bilateral external ears normal, Oropharynx moist, No oral exudates, Nose normal. Neck- Normal range of motion, No tenderness, Supple, No stridor,no apical-carotid delay, no carotid bruit  Eyes:  PERRL, EOMI, Conjunctiva normal, No discharge. Respiratory:  Normal breath sounds, No respiratory distress, No wheezing, No chest tenderness. ,no use of accessory muscles, diaphragm movement is normal  Cardiovascular: (PMI) apex non displaced,no lifts no thrills, no s3,no s4, Normal heart rate, Normal rhythm, No murmurs, No rubs, No gallops. Carotid arteries pulse and amplitude are normal no bruit, no abdominal bruit noted ( normal abdominal aorta ausculation), femoral arteries pulse and amplitude are normal no bruit, pedal pulses are normal  GI:  Bowel sounds normal, Soft, No tenderness, No masses, No pulsatile masses, no hepatosplenomegally, no bruits  : External genitalia appear normal, No masses or lesions. No discharge. No CVA tenderness. Musculoskeletal:  Intact distal pulses, No edema, No tenderness, No cyanosis, No clubbing. Good range of motion in all major joints. No tenderness to palpation or major deformities noted. Back- No tenderness. Integument:  Warm, Dry, No erythema, No rash.    Skin: no rash, no ulcers  Lymphatic:  No

## 2020-01-15 NOTE — PROGRESS NOTES
High LV thresholds  Chest pain    Patient device interrogated  Recent generator change  LV thresholds not capturing even at [email or web address] - previous device LV lead threshold was  [email or web address]     Based on the x-ray, LV lead may not be pushed completely into the device completely but impedance is only mildly increased which goes against it but cannot completely rule it out. -other differential is new scar in the area of the LV lead position, and  - other differential is movement of the LV lead but LV lead has been chronic. Xray does not show significant  Change from old position. Discussed with the patient about options  Patient not willing for any procedure at this time including cath for chest pain.     Patient device is turned to MVP mode currently    Full note to follow

## 2020-01-15 NOTE — PROGRESS NOTES
Pt seen and evaluated, here for CP, resoled now he states, Cardio consulted for work-up. trops neg x 2. Hx of Pacer. Left hip pain, ortho following.

## 2020-01-15 NOTE — H&P
per CT    Chronic kidney disease (CKD)     Chronic pain     on Ultram- dispensed at [facility] per CSP/psych program    Depression     Displacement of electrode lead of cardiac pacemaker 10/4/2019    10/4/2019 RV lead got pulled back in the connector    DJD (degenerative joint disease) of cervical spine     Elevated serum creatinine     Erectile dysfunction     Fractures     Left collar bone x8, left wrist, bilateral toes    GERD (gastroesophageal reflux disease)     GERD (gastroesophageal reflux disease)     H/O echocardiogram 03/26/15    EF 20% Mod dilated LV with severly reduced systolic function. pacer wire noted in right ventricle and right atrium.      Hiatal hernia 5/8/2012    per CT    History of nuclear stress test 05/01/2017    lexiscan-large anteroapical MI, no new changes, EF16%    Hypertension     Hypothyroid     Started by Dr. Michelle Stack Ischemic heart disease     Lipidemia 4/27/2012    Lithium toxicity     4/2015    MI, old 4/27/2012    Paronychia 3/12/2012    S/P cardiac cath 2/21/14    Schizoaffective disorder [facility])     old chart also gives hx of paranoid schizo    Unintentional weight loss 3/22/2012    Possible due to bipolar/tabby/ paranoia (sept 2012) with refusing to eat food from the grocery store believing it's being poisoned      Past Surgical History:   Procedure Laterality Date    ANGIOPLASTY      per old chart pt had angioplasty (LAD) with cutting balloon and arthrectomy done 7/2010    CARDIAC CATHETERIZATION      per old chart pt had cardiac cath 12/2010, 2/2011,4/2012, and 2/2014    CARDIAC SURGERY      arteriogram; stent    CHOLECYSTECTOMY      ENDOSCOPY, COLON, DIAGNOSTIC  9/2015    GASTRIC FUNDOPLICATION  50/47/9714    Robotic laparoscopic assisted nissen, cholecystectomy    HIATAL HERNIA REPAIR  10/7/15    PACEMAKER PLACEMENT      per old chart pt had biV ICD inserted 1/2011 and then 5/2014 had left ventricular lead replacement done    SINUS warfarin (COUMADIN) 2.5 MG tablet Take 2.5 mg by mouth Daily with supper      acetaminophen (TYLENOL) 325 MG tablet Take 650 mg by mouth every 4 hours as needed for Pain      ARIPiprazole (ABILIFY) 30 MG tablet Take 30 mg by mouth every morning       ezetimibe (ZETIA) 10 MG tablet Take 10 mg by mouth daily      atorvastatin (LIPITOR) 80 MG tablet Take 1 tablet by mouth daily 90 tablet 3    nitroGLYCERIN (NITROSTAT) 0.3 MG SL tablet Place 0.3 mg under the tongue every 5 minutes as needed for Chest pain      levothyroxine (SYNTHROID) 75 MCG tablet Take 75 mcg by mouth Daily.  benztropine (COGENTIN) 1 MG tablet Take 1 mg by mouth 2 times daily       loratadine (CLARITIN) 10 MG tablet Take 1 tablet by mouth daily. 90 tablet 3         Allergies  Allergies   Allergen Reactions    Penicillins Swelling    Azithromycin     Clozapine [Clozapine] Swelling    Haldol [Haloperidol Lactate]     Insulins      Per ecf    Ketorolac Tromethamine     Niacin And Related     Ultram [Tramadol]     Zomig [Zolmitriptan]     Codeine Nausea And Vomiting    Depakote [Divalproex Sodium] Other (See Comments)     ' makes eyeball fall out\"    Haldol [Haloperidol Lactate]      Hand and leg tremors with palpitation    Hydrocodone Nausea And Vomiting    Ibuprofen Anxiety     Causes pt to pass out    Imitrex [Sumatriptan] Anxiety    Maxalt [Rizatriptan] Nausea And Vomiting     Makes HA worse    Neurontin [Gabapentin] Other (See Comments)     \" makes eyeballs shrink\"       REVIEW OF SYSTEMS   Within above limitations. 14 point review of systems reviewed. Pertinent positive or negative as per HPI or otherwise negative per 14 point systems review. PHYSICAL EXAM     Wt Readings from Last 3 Encounters:   01/15/20 201 lb 6.4 oz (91.4 kg)   12/04/19 196 lb (88.9 kg)   10/04/19 202 lb 9.6 oz (91.9 kg)       Blood pressure 137/71, pulse 65, temperature 98.4 °F (36.9 °C), temperature source Oral, resp.  rate 13, height 6' 4.5\" Hypothyroidism  Continue Synthroid follow-up TSH    Left hip-chronic pain likely due to osteoarthritis  -Ortho consult, if patient to get hip injection during this admission  -To restart anticoagulation after injection    Depression, mood  anselmo     Case d/w ED provider    DVT: Lovenox subcu, restart Xarelto as appropriate  Code status: full    [facility], Internal Medicine  1/15/2020 at 4:01 AM

## 2020-01-15 NOTE — PROGRESS NOTES
Spoke with legal guardian. She states patient is absolutely refusing to have heart cath at this time. Guardian states that she will leave it up to the patient. States patient will sit in the hospital until the doctor changes his mind and decides to discharge him. Guardian refuses to be alone in the car with the patient as he has angry tendancies. Also informed nursing that we need to be careful upon entering the room as he has a history of throwing furniture at people.

## 2020-01-15 NOTE — ED TRIAGE NOTES
Pt reports about 11pm took 1 nitro at 1228 from [facility] [address] [place] [address] having chest pains and reports his pacemaker had fired

## 2020-01-16 PROCEDURE — G0378 HOSPITAL OBSERVATION PER HR: HCPCS

## 2020-01-16 NOTE — CONSULTS
the tongue every 5 minutes as needed for Chest pain   Yes Historical Provider, MD   levothyroxine (SYNTHROID) 75 MCG tablet Take 75 mcg by mouth Daily. Yes Historical Provider, MD   benztropine (COGENTIN) 1 MG tablet Take 1 mg by mouth 2 times daily    Yes Historical Provider, MD   loratadine (CLARITIN) 10 MG tablet Take 1 tablet by mouth daily. 4/23/13  Yes Ashvin Friedman MD   acetaminophen (TYLENOL) 325 MG tablet Take 650 mg by mouth every 4 hours as needed for Pain    Historical Provider, MD       Allergies     Penicillins; Azithromycin; Clozapine [clozapine]; Haldol [haloperidol lactate]; Insulins; Ketorolac tromethamine; Niacin and related; Ultram [tramadol]; Zomig [zolmitriptan]; Codeine; Depakote [divalproex sodium]; Haldol [haloperidol lactate]; Hydrocodone; Ibuprofen; Imitrex [sumatriptan]; Maxalt [rizatriptan]; and Neurontin [gabapentin]    Social History   TOBACCO:   reports that he has quit smoking. His smoking use included cigarettes. He has a 23.00 pack-year smoking history. He has never used smokeless tobacco.  ETOH:   reports no history of alcohol use. Patient currently lives in a nursing home    Family History         Problem Relation Age of Onset    Mental Illness Mother         substance abuse    Mental Illness Sister     Mental Illness Sister        Review of Systems   Constitutional:  No weight loss, no night sweats  Eyes:  No visual changes  ENT:  No nasal drainage or ear pain  CV:  No chest pain  PULM:  No cough, no shortness of breath  GI:  No nausea, vomiting, diarrhea  :  No dysuria  Musc:  No weakness of arms or legs  Neuro: No numbness, tingling or parethesias  Skin:  No rashes  Psych: No depression symptoms    Physical Exam:    Vitals: BP (!) 143/78   Pulse 81   Temp 98.3 °F (36.8 °C) (Oral)   Resp 16   Ht 6' 4\" (1.93 m)   Wt 202 lb (91.6 kg)   SpO2 96%   BMI 24.59 kg/m² ,  Body mass index is 24.59 kg/m².    General appearance: alert, appears stated age and cooperative  Skin: Skin color, texture, turgor normal. No rashes or lesions  Left hip with pain to rotation. Distally NVI    Labs     CBC:   Recent Labs     01/15/20  0204   WBC 7.2   HGB 12.5*        BMP:    Recent Labs     01/15/20  0204      K 4.9      CO2 27   BUN 20   CREATININE 1.4*   GLUCOSE 93     INR:   Recent Labs     01/15/20  1255   INR 1.08         X-ray view   Imaging Review  AP pelvis with Left hip OA 2/2 AVN. Assessment and Plan     Patient Active Problem List   Diagnosis Code    Chest pain R07.9    Arthritis M19.90    Hypothyroid E03.9    Unintentional weight loss R63.4    Anemia D64.9    CHF (NYHA class III, ACC/AHA stage C) ([facility]) I50.9    Cardiomyopathy I42.9    ASHD (arteriosclerotic heart disease) I25.10    CAD- S/P PTCA (percutaneous transluminal coronary angioplasty) Z98.61    Lipidemia E78.5    Current smoker F17.200    Bipolar affective disorder ([facility] F31.9    Prolonged QT interval R94.31    Allergic rhinitis J30.9    GERD (gastroesophageal reflux disease) K21.9    Lithium toxicity T56.891A    Acute-on-chronic renal failure (HCC) N17.9, N18.9    Hyponatremia T56.1    Metabolic acidosis, normal anion gap (NAG) E87.2    PAF (paroxysmal atrial fibrillation) ([facility]) I48.0    Lethargy R53.83    Hyperlipidemia E78.5    Chest pain at rest R07.9    Acute chest pain R07.9    AICD (automatic cardioverter/defibrillator) present Z95.810    Biventricular ICD (implantable cardioverter-defibrillator) in place Z95.810    Hiatal hernia K44.9    Gilmore's esophagus K22.70    CCC (chronic calculous cholecystitis) K80.10    Atrial fibrillation with RVR ([facility]) I48.91    Elevated brain natriuretic peptide (BNP) level R79.89    Acute kidney injury ([facility] N17.9    Elevated troponin level R79.89    Displacement of electrode lead of cardiac pacemaker T82.120A     Activities as tolerated.  Will proceed with L hip steroid injection after discharge      Graydon Poster

## 2020-01-16 NOTE — PROGRESS NOTES
Pt calm throughout the night. Was okay with remaining NPO after MN although he is still stating that he will not get the heart cath and will be discharging in the morning. Pt REFUSED AM lab draw stating to the phlebotomist that there is no reason to draw labs if he is leaving. Pt encouraged to have labs drawn and continues to refuse. Denies any further needs at this time.

## 2020-01-16 NOTE — PROGRESS NOTES
I was notified by nurse Milli Patiño that the patient is requesting to be discharged tonight. I spoke with the patient. Patient upset and states he is not getting any procedure tomorrow and Luli-His court appointed legal guardian has no right to make decisions for him. He states that [facility] already has a transport waiting on him. I spoke with Christo Smoothne from [facility] who reports they have no transportation set up for him until tomorrow. Nurse Milli Patiño also spoke with the  for [facility] who encouraged patient to stay and she will come see him in the morning. Cardiologist and attending note reviewed and discussed with the patient. Patient educated on the need to stay overnight for a team discussion tomorrow possibly. Risk of leaving AMA also discussed with the patient. At time of this discussion, the patient is alert and oriented to person, place, time and situation and completely makes decisions. Patient currently in bed. He is at risk for dc AMA tonight.

## 2020-01-17 LAB
EKG ATRIAL RATE: 62 BPM
EKG ATRIAL RATE: 68 BPM
EKG DIAGNOSIS: NORMAL
EKG DIAGNOSIS: NORMAL
EKG P AXIS: 69 DEGREES
EKG P AXIS: 92 DEGREES
EKG P-R INTERVAL: 160 MS
EKG P-R INTERVAL: 218 MS
EKG Q-T INTERVAL: 452 MS
EKG Q-T INTERVAL: 460 MS
EKG QRS DURATION: 158 MS
EKG QRS DURATION: 188 MS
EKG QTC CALCULATION (BAZETT): 458 MS
EKG QTC CALCULATION (BAZETT): 489 MS
EKG R AXIS: -38 DEGREES
EKG R AXIS: -62 DEGREES
EKG T AXIS: 172 DEGREES
EKG T AXIS: 202 DEGREES
EKG VENTRICULAR RATE: 62 BPM
EKG VENTRICULAR RATE: 68 BPM

## 2020-01-17 PROCEDURE — 93010 ELECTROCARDIOGRAM REPORT: CPT | Performed by: INTERNAL MEDICINE

## 2020-01-23 ASSESSMENT — ENCOUNTER SYMPTOMS
DIARRHEA: 0
COLOR CHANGE: 0
SHORTNESS OF BREATH: 1
CHEST TIGHTNESS: 0
NAUSEA: 0
EYE PAIN: 0
BLOOD IN STOOL: 0
BACK PAIN: 0
ABDOMINAL PAIN: 0
COUGH: 0
CONSTIPATION: 0
PHOTOPHOBIA: 0
VOMITING: 0
WHEEZING: 0

## 2020-01-29 ENCOUNTER — TELEPHONE (OUTPATIENT)
Dept: CARDIOLOGY CLINIC | Age: 65
End: 2020-01-29

## 2020-02-13 ENCOUNTER — TELEPHONE (OUTPATIENT)
Dept: CARDIOLOGY CLINIC | Age: 65
End: 2020-02-13

## 2020-02-20 LAB
ALBUMIN SERPL-MCNC: 3.9 G/DL
ALP BLD-CCNC: 78 U/L
ALT SERPL-CCNC: 23 U/L
ANION GAP SERPL CALCULATED.3IONS-SCNC: NORMAL MMOL/L
AST SERPL-CCNC: 16 U/L
BASOPHILS ABSOLUTE: ABNORMAL
BASOPHILS RELATIVE PERCENT: ABNORMAL
BILIRUB SERPL-MCNC: 0.4 MG/DL (ref 0.1–1.4)
BUN BLDV-MCNC: 20 MG/DL
CALCIUM SERPL-MCNC: 9.6 MG/DL
CHLORIDE BLD-SCNC: 107 MMOL/L
CHOLESTEROL, TOTAL: 152 MG/DL
CHOLESTEROL/HDL RATIO: NORMAL
CO2: 27 MMOL/L
CREAT SERPL-MCNC: 1.2 MG/DL
EOSINOPHILS ABSOLUTE: ABNORMAL
EOSINOPHILS RELATIVE PERCENT: ABNORMAL
GFR CALCULATED: NORMAL
GLUCOSE BLD-MCNC: 71 MG/DL
HCT VFR BLD CALC: 40 % (ref 41–53)
HDLC SERPL-MCNC: 42 MG/DL (ref 35–70)
HEMOGLOBIN: 13.4 G/DL (ref 13.5–17.5)
LDL CHOLESTEROL CALCULATED: 99 MG/DL (ref 0–160)
LYMPHOCYTES ABSOLUTE: ABNORMAL
LYMPHOCYTES RELATIVE PERCENT: ABNORMAL
MCH RBC QN AUTO: ABNORMAL PG
MCHC RBC AUTO-ENTMCNC: ABNORMAL G/DL
MCV RBC AUTO: ABNORMAL FL
MONOCYTES ABSOLUTE: ABNORMAL
MONOCYTES RELATIVE PERCENT: ABNORMAL
NEUTROPHILS ABSOLUTE: ABNORMAL
NEUTROPHILS RELATIVE PERCENT: ABNORMAL
PLATELET # BLD: 210 K/ΜL
PMV BLD AUTO: ABNORMAL FL
POTASSIUM SERPL-SCNC: 5.2 MMOL/L
RBC # BLD: 4.18 10^6/ΜL
SODIUM BLD-SCNC: 141 MMOL/L
TOTAL PROTEIN: 6.2
TRIGL SERPL-MCNC: 55 MG/DL
VLDLC SERPL CALC-MCNC: 11 MG/DL
WBC # BLD: 6.8 10^3/ML

## 2020-03-10 ENCOUNTER — PROCEDURE VISIT (OUTPATIENT)
Dept: CARDIOLOGY CLINIC | Age: 65
End: 2020-03-10
Payer: MEDICARE

## 2020-03-10 PROCEDURE — 93296 REM INTERROG EVL PM/IDS: CPT | Performed by: INTERNAL MEDICINE

## 2020-03-10 PROCEDURE — 93295 DEV INTERROG REMOTE 1/2/MLT: CPT | Performed by: INTERNAL MEDICINE

## 2020-03-11 ENCOUNTER — OFFICE VISIT (OUTPATIENT)
Dept: CARDIOLOGY CLINIC | Age: 65
End: 2020-03-11
Payer: MEDICARE

## 2020-03-11 VITALS
WEIGHT: 194.4 LBS | BODY MASS INDEX: 22.95 KG/M2 | HEART RATE: 72 BPM | DIASTOLIC BLOOD PRESSURE: 64 MMHG | SYSTOLIC BLOOD PRESSURE: 104 MMHG | HEIGHT: 77 IN

## 2020-03-11 PROBLEM — I48.91 ATRIAL FIBRILLATION WITH RVR (HCC): Status: RESOLVED | Noted: 2019-09-30 | Resolved: 2020-03-11

## 2020-03-11 PROBLEM — T82.120A DISPLACEMENT OF ELECTRODE LEAD OF CARDIAC PACEMAKER: Status: RESOLVED | Noted: 2019-10-04 | Resolved: 2020-03-11

## 2020-03-11 PROCEDURE — 1036F TOBACCO NON-USER: CPT | Performed by: INTERNAL MEDICINE

## 2020-03-11 PROCEDURE — G8428 CUR MEDS NOT DOCUMENT: HCPCS | Performed by: INTERNAL MEDICINE

## 2020-03-11 PROCEDURE — 99214 OFFICE O/P EST MOD 30 MIN: CPT | Performed by: INTERNAL MEDICINE

## 2020-03-11 PROCEDURE — 3017F COLORECTAL CA SCREEN DOC REV: CPT | Performed by: INTERNAL MEDICINE

## 2020-03-11 PROCEDURE — G8484 FLU IMMUNIZE NO ADMIN: HCPCS | Performed by: INTERNAL MEDICINE

## 2020-03-11 PROCEDURE — G8420 CALC BMI NORM PARAMETERS: HCPCS | Performed by: INTERNAL MEDICINE

## 2020-03-11 RX ORDER — MEDROXYPROGESTERONE ACETATE 10 MG/1
1 TABLET ORAL 2 TIMES DAILY
Status: ON HOLD | COMMUNITY
Start: 2020-03-05 | End: 2021-11-23 | Stop reason: HOSPADM

## 2020-03-11 RX ORDER — ZIPRASIDONE HYDROCHLORIDE 40 MG/1
1 CAPSULE ORAL 2 TIMES DAILY
COMMUNITY
Start: 2020-02-19

## 2020-03-11 RX ORDER — HYDROCODONE BITARTRATE AND ACETAMINOPHEN 5; 325 MG/1; MG/1
1 TABLET ORAL 3 TIMES DAILY PRN
COMMUNITY
Start: 2020-03-05

## 2020-03-11 NOTE — PROGRESS NOTES
Cruz Diaz  1955  Roula Galvez MD    Chief complaint and HPI:  Cruz Diaz  is a 60 y.o. male following up for coronary artery disease, severe hyperlipidemia and history of paroxysmal atrial fibrillation and cardiomyopathy status post ICD. He was started on Lipitor 80 mg daily on his last visit and he is tolerating it well. He had repeat lipids done LDL came down to 99 from 158. Denies any other cardiac symptoms. He lives at [facility]. Rest of the Cardiovascular system review is otherwise unchanged from prior encounter. Past medical history:  has a past medical history of A-fib ([facility] Abdominal pain, Acute on chronic renal failure ([address] Allergic rhinitis, Bipolar affective disorder unspecified, Bursitis of left shoulder, CAD (coronary artery disease), Cardiomyopathy ([address] CHF (NYHA class III, ACC/AHA stage C) ([address] Cholelithiasis, Chronic kidney disease (CKD), Chronic pain, Depression, Displacement of electrode lead of cardiac pacemaker, DJD (degenerative joint disease) of cervical spine, Elevated serum creatinine, Erectile dysfunction, Fractures, GERD (gastroesophageal reflux disease), GERD (gastroesophageal reflux disease), H/O echocardiogram, Hiatal hernia, History of nuclear stress test, Hypertension, Hypothyroid, Ischemic heart disease, Lipidemia, Lithium toxicity, MI, old, Paronychia, S/P cardiac cath, Schizoaffective disorder ([address] and Unintentional weight loss. Past surgical history:  has a past surgical history that includes sinus surgery; Tonsillectomy; Cardiac surgery; Cardiac catheterization; angioplasty; pacemaker placement; Endoscopy, colon, diagnostic (9/2015); Gastric fundoplication (20/54/7766); hiatal hernia repair (10/7/15); and Cholecystectomy.   Social History:   Social History     Tobacco Use    Smoking status: Former Smoker     Packs/day: 0.50     Years: 46.00     Pack years: 23.00     Types: Cigarettes    Smokeless tobacco: Never Used   Substance Use Topics    Chest pain    Historical Provider, MD     Vitals:    03/11/20 1106   BP: 104/64   Site: Right Upper Arm   Position: Sitting   Cuff Size: Medium Adult   Pulse: 72   Weight: 194 lb 6.4 oz (88.2 kg)   Height: 6' 4.5\" (1.943 m)      Body mass index is 23.35 kg/m². Wt Readings from Last 3 Encounters:   03/11/20 194 lb 6.4 oz (88.2 kg)   01/15/20 202 lb (91.6 kg)   12/04/19 196 lb (88.9 kg)     Constitutional:  Patient is normally built and nourished male in no apparent distress. Eyes:  Pupils are equal.  He wears glasses. NECK: No JVP or thyromegaly  Cardiovascular: Auscultation: Normal S1 and S2. No significant murmurs noted. Left-sided pacemaker pocket has healed well. No pocket hematoma or redness or swelling noted. Respiratory:  Respiratory effort is normal. Breath sounds are clear to auscultation. Extremities:  No edema, clubbing,  Cyanosis, petechiae. SKIN: Warm and well perfused, no pallor or cyanosis  Abdomen:  No masses or tenderness. No organomegaly noted. Neurologic:  Oriented to time, place, and person and non-anxious. No focal neurological deficit noted. Psychiatric: Normal mood and effect. 3/10/2020 ICD analysis is reviewed and is consistent with normal Medtronic MRI safe CRT-D function with stable leads and appropriate battery status for the age of the device. No therapies detected. Programming parameters are also reviewed for optimal settings for this device in this patient. Device is  49% sensing and both atrium and ventricle and 51% pacing in the atrium and sensing in the ventricle.  Remaining device longevity is 8.8 years.  Opti Vol suggest well compensated CHF status.  Compared to previous interrogations A. fib has improved.  Patient is on warfarin therapy.     LAB REVIEW:  CBC:   Lab Results   Component Value Date    WBC 6.8 02/20/2020    HGB 13.4 02/20/2020    HCT 40.0 02/20/2020     02/20/2020     Lipids:   Lab Results   Component Value Date    CHOL 152 02/20/2020    TRIG 55 02/20/2020    HDL 42 02/20/2020    LDLCALC 99 02/20/2020    LDLDIRECT 148 (H) 05/13/2016     Renal:   Lab Results   Component Value Date    BUN 20 02/20/2020    CREATININE 1.2 02/20/2020     02/20/2020    K 5.2 02/20/2020     PT/INR:   Lab Results   Component Value Date    INR 1.08 01/15/2020     IMPRESSION and RECOMMENDATIONS:      CAD- S/P PTCA (percutaneous transluminal coronary angioplasty)  Clinically stable. Aggressive risk modification for secondary prevention is the goal.    PAF (paroxysmal atrial fibrillation) (HCC)  Improved on the ICD check. Continue to monitor. Continue warfarin PT/INR is followed by PCP. Hyperlipidemia  LDL dropped to 99 on combination of Lipitor and Zetia. Continue the same. He declines PC SK 9 therapy. Biventricular ICD (implantable cardioverter-defibrillator) in place  Working well. Continue device check as per care link schedule. Continue current cardiovascular medications which have been reviewed and discussed individually with you. Declines PCSK. Primary/secondary prevention is the goal by aggressive risk modification, healthy and therapeutic life style changes for cardiovascular risk reduction. Various goals are discussed and questions answered. Appropriate prescriptions if needed on this visit are addressed. After visit summery is provided. Questions answered and patient verbalizes understanding. Follow up in 6 months with repeat lipids,  sooner if needed. Continue device check as per care link schedule. Apple Weldon MD, 3/11/2020 12:07 PM     Please note this report has been partially produced using speech recognition software and may contain errors related to that system including errors in grammar, punctuation, and spelling, as well as words and phrases that may be inappropriate. If there are any questions or concerns please feel free to contact the dictating provider for clarification.

## 2020-06-16 ENCOUNTER — PROCEDURE VISIT (OUTPATIENT)
Dept: CARDIOLOGY CLINIC | Age: 65
End: 2020-06-16
Payer: MEDICARE

## 2020-06-16 PROCEDURE — 93296 REM INTERROG EVL PM/IDS: CPT | Performed by: INTERNAL MEDICINE

## 2020-06-16 PROCEDURE — 93295 DEV INTERROG REMOTE 1/2/MLT: CPT | Performed by: INTERNAL MEDICINE

## 2020-09-23 ENCOUNTER — PROCEDURE VISIT (OUTPATIENT)
Dept: CARDIOLOGY CLINIC | Age: 65
End: 2020-09-23
Payer: MEDICARE

## 2020-09-23 PROCEDURE — 93296 REM INTERROG EVL PM/IDS: CPT | Performed by: INTERNAL MEDICINE

## 2020-09-23 PROCEDURE — 93295 DEV INTERROG REMOTE 1/2/MLT: CPT | Performed by: INTERNAL MEDICINE

## 2020-09-23 PROCEDURE — 93297 REM INTERROG DEV EVAL ICPMS: CPT | Performed by: INTERNAL MEDICINE

## 2021-05-25 ENCOUNTER — PROCEDURE VISIT (OUTPATIENT)
Dept: CARDIOLOGY CLINIC | Age: 66
End: 2021-05-25
Payer: MEDICARE

## 2021-05-25 ENCOUNTER — TELEPHONE (OUTPATIENT)
Dept: CARDIOLOGY CLINIC | Age: 66
End: 2021-05-25

## 2021-05-25 DIAGNOSIS — Z95.810 BIVENTRICULAR ICD (IMPLANTABLE CARDIOVERTER-DEFIBRILLATOR) IN PLACE: Primary | ICD-10-CM

## 2021-05-25 DIAGNOSIS — I49.5 SINUS NODE DYSFUNCTION (HCC): ICD-10-CM

## 2021-05-25 PROCEDURE — 93296 REM INTERROG EVL PM/IDS: CPT | Performed by: INTERNAL MEDICINE

## 2021-05-25 PROCEDURE — 93295 DEV INTERROG REMOTE 1/2/MLT: CPT | Performed by: INTERNAL MEDICINE

## 2021-05-25 PROCEDURE — 93297 REM INTERROG DEV EVAL ICPMS: CPT | Performed by: INTERNAL MEDICINE

## 2021-05-25 NOTE — LETTER
Cardiology [address] [facility] [address]  Phone: [phone]  Fax: [phone]    5/25/2021        [address] [place] [address]            Dear Abdiel Romero:    This is your Carelink schedule. You can omar your calendar with these dates. Remember that your device is wireless and should automatically do these checks while you are sleeping. If for any reason I do not get your transmission then I will call you and ask that you send a manual transmission. If you have any questions or concerns, please call and ask for Rena Tian at [phone]. Thank you.

## 2021-06-16 ENCOUNTER — OFFICE VISIT (OUTPATIENT)
Dept: CARDIOLOGY CLINIC | Age: 66
End: 2021-06-16
Payer: MEDICARE

## 2021-06-16 ENCOUNTER — TELEPHONE (OUTPATIENT)
Dept: CARDIOLOGY CLINIC | Age: 66
End: 2021-06-16

## 2021-06-16 DIAGNOSIS — I25.10 CAD IN NATIVE ARTERY: Primary | ICD-10-CM

## 2021-06-16 PROCEDURE — 93000 ELECTROCARDIOGRAM COMPLETE: CPT | Performed by: INTERNAL MEDICINE

## 2021-06-16 NOTE — TELEPHONE ENCOUNTER
Pt is a current pt of Dr. Vicente Dietrich that came in for OV today, stating he thought he was here for a device check, accompanied by his son. I advised pt that his device was checked on 5/24/2021, and today's visit was for check up with the doctor. Patient refused to be seen for OV and he was addiment about leaving and headed for the door. I discussed the situation with Adia Myers,  and Anuradha Ruth, then discussed with Dr. Danell Felty. Dr. Danell Felty attempted to call pt on the cell # we have on file, but line was not working. Dr. Danell Felty then called the facility where pt lives and spoke with pt's nurse, Lexi Roman @ [phone]. Dr. Danell Felty expressed his concern about the pt and explained the events that happened in office just a few minutes prior. Lexi Roman RN explained that pt has been exhibiting some unstable mental behaviors there and believed him to be currently going through a \"cycle\". Dr. Danell Felty advised Lexi Roman RN that pt's ICD is functioning well and that pt should be seen regularly for OV, per normal monitoring protocol, and to let pt know that he called to check on pt. Lexi Roman was also advised by Dr. Danell Felty to have pt call to schedule to be seen when he feels he's able. Lexi Roman verbalized understanding.

## 2021-08-30 ENCOUNTER — PROCEDURE VISIT (OUTPATIENT)
Dept: CARDIOLOGY CLINIC | Age: 66
End: 2021-08-30
Payer: MEDICARE

## 2021-08-30 DIAGNOSIS — I49.5 SINUS NODE DYSFUNCTION (HCC): ICD-10-CM

## 2021-08-30 DIAGNOSIS — Z95.810 BIVENTRICULAR ICD (IMPLANTABLE CARDIOVERTER-DEFIBRILLATOR) IN PLACE: Primary | ICD-10-CM

## 2021-08-30 PROCEDURE — 93296 REM INTERROG EVL PM/IDS: CPT | Performed by: INTERNAL MEDICINE

## 2021-08-30 PROCEDURE — 93297 REM INTERROG DEV EVAL ICPMS: CPT | Performed by: INTERNAL MEDICINE

## 2021-08-30 PROCEDURE — 93295 DEV INTERROG REMOTE 1/2/MLT: CPT | Performed by: INTERNAL MEDICINE

## 2021-09-15 ENCOUNTER — PROCEDURE VISIT (OUTPATIENT)
Dept: CARDIOLOGY CLINIC | Age: 66
End: 2021-09-15
Payer: COMMERCIAL

## 2021-09-15 ENCOUNTER — OFFICE VISIT (OUTPATIENT)
Dept: CARDIOLOGY CLINIC | Age: 66
End: 2021-09-15
Payer: COMMERCIAL

## 2021-09-15 VITALS
WEIGHT: 196.8 LBS | HEIGHT: 76 IN | RESPIRATION RATE: 14 BRPM | HEART RATE: 91 BPM | BODY MASS INDEX: 23.97 KG/M2 | SYSTOLIC BLOOD PRESSURE: 98 MMHG | DIASTOLIC BLOOD PRESSURE: 60 MMHG | OXYGEN SATURATION: 97 %

## 2021-09-15 VITALS — SYSTOLIC BLOOD PRESSURE: 104 MMHG | HEART RATE: 102 BPM | DIASTOLIC BLOOD PRESSURE: 60 MMHG

## 2021-09-15 VITALS
BODY MASS INDEX: 23.24 KG/M2 | DIASTOLIC BLOOD PRESSURE: 60 MMHG | SYSTOLIC BLOOD PRESSURE: 104 MMHG | HEIGHT: 77 IN | WEIGHT: 196.8 LBS | HEART RATE: 102 BPM

## 2021-09-15 DIAGNOSIS — Z95.810 BIVENTRICULAR ICD (IMPLANTABLE CARDIOVERTER-DEFIBRILLATOR) IN PLACE: Primary | ICD-10-CM

## 2021-09-15 DIAGNOSIS — I48.0 PAF (PAROXYSMAL ATRIAL FIBRILLATION) (HCC): Chronic | ICD-10-CM

## 2021-09-15 DIAGNOSIS — I48.0 AF (PAROXYSMAL ATRIAL FIBRILLATION) (HCC): Primary | ICD-10-CM

## 2021-09-15 DIAGNOSIS — Z98.61 S/P PTCA (PERCUTANEOUS TRANSLUMINAL CORONARY ANGIOPLASTY): Chronic | ICD-10-CM

## 2021-09-15 DIAGNOSIS — E78.2 MIXED HYPERLIPIDEMIA: ICD-10-CM

## 2021-09-15 PROCEDURE — 1036F TOBACCO NON-USER: CPT | Performed by: INTERNAL MEDICINE

## 2021-09-15 PROCEDURE — 93000 ELECTROCARDIOGRAM COMPLETE: CPT | Performed by: INTERNAL MEDICINE

## 2021-09-15 PROCEDURE — 99214 OFFICE O/P EST MOD 30 MIN: CPT | Performed by: INTERNAL MEDICINE

## 2021-09-15 PROCEDURE — 3017F COLORECTAL CA SCREEN DOC REV: CPT | Performed by: INTERNAL MEDICINE

## 2021-09-15 PROCEDURE — G8420 CALC BMI NORM PARAMETERS: HCPCS | Performed by: INTERNAL MEDICINE

## 2021-09-15 PROCEDURE — G8428 CUR MEDS NOT DOCUMENT: HCPCS | Performed by: INTERNAL MEDICINE

## 2021-09-15 PROCEDURE — 99215 OFFICE O/P EST HI 40 MIN: CPT | Performed by: INTERNAL MEDICINE

## 2021-09-15 PROCEDURE — 93290 INTERROG DEV EVAL ICPMS IP: CPT | Performed by: INTERNAL MEDICINE

## 2021-09-15 PROCEDURE — 93284 PRGRMG EVAL IMPLANTABLE DFB: CPT | Performed by: INTERNAL MEDICINE

## 2021-09-15 RX ORDER — DIGOXIN 125 MCG
125 TABLET ORAL DAILY
Qty: 30 TABLET | Refills: 3 | Status: SHIPPED | OUTPATIENT
Start: 2021-09-15

## 2021-09-15 RX ORDER — METOPROLOL SUCCINATE 25 MG/1
25 TABLET, EXTENDED RELEASE ORAL DAILY
Qty: 30 TABLET | Refills: 3 | Status: SHIPPED | OUTPATIENT
Start: 2021-09-15

## 2021-09-15 NOTE — ASSESSMENT & PLAN NOTE
Has some chest tightness with hypotension after he takes Cardizem. He is not having any chest tightness today with tachycardia with heart rate of 125 bpm.  We will slow the heart rate down with digitalization until he is seen by EP for possible atrial flutter ablation or DC cardioversion and antiarrhythmic therapy .

## 2021-09-15 NOTE — PROGRESS NOTES
Electrophysiology FU Note      Reason for consultation:  Atrial fibrillation    Chief complaint : atrial flutter    Referring physician: Marlon Ellis      Primary care physician: Eryn Faulkner MD      History of Present Illness:     Patient is a 53-year-old male with a history of ischemic cardiomyopathy s/p BiV ICD, bipolar affective disorder, PAF, referred by Dr. Panda Sifuentes as an urgent referral because of atrial fibrillation/flutter with RVR  -patient reports that he is doing better now. He has no complaints. Patient denies any symptoms. Patient reports in the morning he had fast heart rate but he has no symptoms now. Patient reports he smokes and does not drink alcohol. He does drink some caffeine. Patient denies any chest pain, dizziness or syncope.   Patient does not want anything done as he feels better now and he does not want any procedures      Pastmedical history:   Past Medical History:   Diagnosis Date    A-fib [facility])     on Coumadin    Abdominal pain 3/22/2012    Acute on chronic renal failure (HCC)     Allergic rhinitis     Bipolar affective disorder unspecified     possible schziophrenia per pt- Seeing Dr. Rosalinda Bradley of left shoulder 2007    s/p injection     CAD (coronary artery disease)     see Leonidas    Cardiomyopathy ([facility] [address]     NYHA FC II-III    CHF (NYHA class III, ACC/AHA stage C) ([facility] [address] 4/27/2012    Cholelithiasis 5/8/2012    per CT    Chronic kidney disease (CKD)     Chronic pain     on Ultram- dispensed at [facility] per CSP/psych program    Depression     Displacement of electrode lead of cardiac pacemaker 10/4/2019    10/4/2019 RV lead got pulled back in the connector    DJD (degenerative joint disease) of cervical spine     Elevated serum creatinine     Erectile dysfunction     Fractures     Left collar bone x8, left wrist, bilateral toes    GERD (gastroesophageal reflux disease)     GERD (gastroesophageal reflux disease)     H/O echocardiogram 03/26/15    EF 20% Mod dilated LV with severly reduced systolic function. pacer wire noted in right ventricle and right atrium.  Hiatal hernia 5/8/2012    per CT    History of nuclear stress test 05/01/2017    lexiscan-large anteroapical MI, no new changes, EF16%    Hypertension     Hypothyroid     Started by Dr. Fernandez Wakefield Ischemic heart disease     Lipidemia 4/27/2012    Lithium toxicity     4/2015    MI, old 4/27/2012    Paronychia 3/12/2012    S/P cardiac cath 2/21/14    Schizoaffective disorder [facility])     old chart also gives hx of paranoid schizo    Unintentional weight loss 3/22/2012    Possible due to bipolar/tabby/ paranoia (sept 2012) with refusing to eat food from the grocery store believing it's being poisoned        Surgical history :   Past Surgical History:   Procedure Laterality Date    ANGIOPLASTY      per old chart pt had angioplasty (LAD) with cutting balloon and arthrectomy done 7/2010    CARDIAC CATHETERIZATION      per old chart pt had cardiac cath 12/2010, 2/2011,4/2012, and 2/2014    CARDIAC SURGERY      arteriogram; stent    CHOLECYSTECTOMY      ENDOSCOPY, COLON, DIAGNOSTIC  9/2015    GASTRIC FUNDOPLICATION  44/84/4467    Robotic laparoscopic assisted nissen, cholecystectomy    HIATAL HERNIA REPAIR  10/7/15    PACEMAKER PLACEMENT      per old chart pt had biV ICD inserted 1/2011 and then 5/2014 had left ventricular lead replacement done    SINUS SURGERY      with allergy testing done in the past- Dr. Jules Helton (([facility])    TONSILLECTOMY         Family history:   Family History   Problem Relation Age of Onset    Mental Illness Mother         substance abuse    Mental Illness Sister     Mental Illness Sister        Social history :  reports that he has quit smoking. His smoking use included cigarettes. He has a 23.00 pack-year smoking history.  He has never used smokeless tobacco. He reports that he does not drink alcohol and does not use drugs. Allergies   Allergen Reactions    Penicillins Swelling    Azithromycin     Clozapine [Clozapine] Swelling    Haldol [Haloperidol Lactate]     Insulins      Per ecf    Ketorolac Tromethamine     Niacin And Related     Ultram [Tramadol]     Zomig [Zolmitriptan]     Codeine Nausea And Vomiting    Depakote [Divalproex Sodium] Other (See Comments)     ' makes eyeball fall out\"    Haldol [Haloperidol Lactate]      Hand and leg tremors with palpitation    Hydrocodone Nausea And Vomiting    Ibuprofen Anxiety     Causes pt to pass out    Imitrex [Sumatriptan] Anxiety    Maxalt [Rizatriptan] Nausea And Vomiting     Makes HA worse    Neurontin [Gabapentin] Other (See Comments)     \" makes eyeballs shrink\"       Current Outpatient Medications on File Prior to Visit   Medication Sig Dispense Refill    digoxin (LANOXIN) 125 MCG tablet Take 1 tablet by mouth daily 2 tab every 12 hours x 48 hours then one daily. 30 tablet 3    HYDROcodone-acetaminophen (NORCO) 5-325 MG per tablet 1 tablet 3 times daily as needed.  medroxyPROGESTERone (PROVERA) 10 MG tablet 1 tablet 2 times daily      ziprasidone (GEODON) 40 MG capsule 1 capsule 2 times daily      diltiazem (CARDIZEM) 30 MG tablet Take 1 tablet by mouth every 8 hours (Patient taking differently: Take 45 mg by mouth every 8 hours Pt takes 1.5 tablets daily) 120 tablet 3    warfarin (COUMADIN) 2.5 MG tablet Take 2.5 mg by mouth Daily with supper      acetaminophen (TYLENOL) 325 MG tablet Take 650 mg by mouth every 4 hours as needed for Pain      ezetimibe (ZETIA) 10 MG tablet Take 10 mg by mouth daily      atorvastatin (LIPITOR) 80 MG tablet Take 1 tablet by mouth daily 90 tablet 3    nitroGLYCERIN (NITROSTAT) 0.3 MG SL tablet Place 0.3 mg under the tongue every 5 minutes as needed for Chest pain      levothyroxine (SYNTHROID) 75 MCG tablet Take 75 mcg by mouth Daily.       benztropine (COGENTIN) 1 MG tablet Take 1 mg by mouth 2 times daily       loratadine (CLARITIN) 10 MG tablet Take 1 tablet by mouth daily. 90 tablet 3     No current facility-administered medications on file prior to visit. Review of Systems:   Review of Systems   Constitutional: Negative for activity change, chills, fatigue and fever. HENT: Negative for congestion, ear pain and tinnitus. Eyes: Negative for photophobia, pain and visual disturbance. Respiratory: Negative for cough, chest tightness, shortness of breath and wheezing. Cardiovascular: Negative for chest pain, palpitations and leg swelling. Gastrointestinal: Negative for abdominal pain, blood in stool, constipation, diarrhea, nausea and vomiting. Endocrine: Negative for cold intolerance and heat intolerance. Genitourinary: Negative for dysuria, flank pain and hematuria. Musculoskeletal: Negative for arthralgias, back pain, myalgias and neck stiffness. Skin: Negative for color change and rash. Allergic/Immunologic: Negative for food allergies. Neurological: Negative for dizziness, light-headedness, numbness and headaches. Hematological: Does not bruise/bleed easily. Psychiatric/Behavioral: Negative for agitation, behavioral problems and confusion. Examination:      Vitals:    09/15/21 1314   BP: 98/60   Pulse: 91   Resp: 14   SpO2: 97%   Weight: 196 lb 12.8 oz (89.3 kg)   Height: 6' 4\" (1.93 m)        Body mass index is 23.96 kg/m². Physical Exam  Constitutional:       Appearance: Normal appearance. He is not ill-appearing. HENT:      Head: Normocephalic and atraumatic. Mouth/Throat:      Mouth: Mucous membranes are moist.   Eyes:      Conjunctiva/sclera: Conjunctivae normal.   Cardiovascular:      Rate and Rhythm: Normal rate and regular rhythm. Heart sounds: Murmur (grade 2/6 systolic murmur) heard. Pulmonary:      Effort: Pulmonary effort is normal.      Breath sounds: No rales. Abdominal:      General: Abdomen is flat.       Palpations: Abdomen is soft.   Musculoskeletal:         General: No tenderness. Normal range of motion. Cervical back: Normal range of motion. Right lower leg: No edema. Left lower leg: No edema. Skin:     General: Skin is warm and dry. Neurological:      General: No focal deficit present. Mental Status: He is alert and oriented to person, place, and time. CBC:   Lab Results   Component Value Date    WBC 6.8 02/20/2020    HGB 13.4 02/20/2020    HCT 40.0 02/20/2020     02/20/2020     Lipids:  Lab Results   Component Value Date    CHOL 152 02/20/2020    TRIG 55 02/20/2020    HDL 42 02/20/2020    LDLCALC 99 02/20/2020    LDLDIRECT 148 (H) 05/13/2016     PT/INR:   Lab Results   Component Value Date    INR 1.08 01/15/2020        BMP:    Lab Results   Component Value Date     02/20/2020    K 5.2 02/20/2020     02/20/2020    CO2 27 02/20/2020    BUN 20 02/20/2020     CMP:   Lab Results   Component Value Date    AST 16 02/20/2020    PROT 6.3 (L) 10/01/2019    BILITOT 0.4 02/20/2020    ALKPHOS 78 02/20/2020     TSH:    Lab Results   Component Value Date    TSH 1.664 03/09/2018       EKGINTERPRETATION - EKG Interpretation:        IMPRESSION / RECOMMENDATIONS:     Atrial fibrillation with RVR  Ischemic cardiomyopathy sp BIVICD  Non functioning LV lead. Bipolar affective disorder    Patient with severe left ventricular systolic dysfunction s/p BiV ICD. I reviewed patient chart and patient was records sent to me as an urgent referral because of A. fib with RVR today in the office. While the time I was assessing him patient converted to sinus rhythm. The patient has an LV lead which is nonfunctioning. Discussed with the patient about options of medical therapy. Patient does not want cardioversion but he already converted. Discussed with the patient about options of ablation atrial fibrillation versus AV node ablation patient does not want any ablation either.   Patient reports he is feeling fine.  Initially presentation was because of hypotension and we have stopped his Cardizem but patient LVEF is already low so I am not sure why he is not on metoprolol XL. I will start him on metoprolol XL if not contraindicated. If blood pressure is issues then we can hold it or add midodrine. Patient reports that he is does not want anything done. I told patient that LV lead is nonfunctioning and is not getting benefit from the device and I offered to replace another LV lead but patient does not want to have any procedures. Given this I recommended to continue patient's digoxin which Dr. Caryn Roldan is recommended and also start low-dose metoprolol on the patient instead of Cardizem. Follow-up with Dr. Caryn Roldan      Thanks again for allowing me to participate in care of this patient. Please call me if you have any questions. With best regards. Mindy Sullivan MD, 9/15/2021 1:24 PM     Please note this report has been partially produced using speech recognition software and may contain errors related to that system including errors in grammar, punctuation, and spelling, as well as words and phrases that may be inappropriate. If there are any questions or concerns please feel free to contact the dictating provider for clarification.

## 2021-09-15 NOTE — ASSESSMENT & PLAN NOTE
Patient is in atrial flutter with rapid ventricular rate response seems to be tolerating fairly well today. We will continue anticoagulation therapy digitalized to control the rate and consider atrial flutter ablation or DC cardioversion as soon as possible. Case discussed with EP nurse. We will have him see Dr. Gardner Argue soon as possible.

## 2021-09-15 NOTE — LETTER
[facility]    Dr. Malathi Frederick  1955  [id number]    Have you had any Chest Pain that is not new? - Yes, when blood pressure was dropping recently. BP med was stopped and chest tightness has stopped. If Yes DO EKG - How does it feel - Tightness   How long does the pain last - 30 minutes    How long have you been having the pain - Weeks   Did you take a Nitro   And did it relieve the pain - Yes      Have you had any Shortness of Breath - No  If Yes - When     Have you had any dizziness - Yes  If Yes DO ORTHOSTATIC BP - when do you feel dizzy when blood pressure drops. 70's/40's. How long does it last .30  minutes     Have you had any palpitations that are not new? - Yes  If Yes DO EKG - Do you feel your heart racing and skipping   How long does it last - .2  hours     Do you have any edema - swelling in No    If Yes - CHECK TO SEE IF THE EDEMA IS PITTING    When did you have your last labs drawn 9/8/2021 not in [facility] or care everywhere  Where did you have them done Dr. Ant Butt  What doctor ordered Dr. Ant Butt    If we do not have these labs you are retrieve these labs for these providers!     Do you have a surgery or procedure scheduled in the near future - No  If Yes- DO EKG

## 2021-09-15 NOTE — PROGRESS NOTES
[id number] Score for Atrial Fibrillation Stroke Risk   Risk   Factors  Component Value   C CHF Yes 1   H HTN No 0   A2 Age >= 76 No,  (64 y.o.) 0   D DM No 0   S2 Prior Stroke/TIA No 0   V Vascular Disease No 0   A Age 74-69 No,  (64 y.o.) 0   Sc Sex male 0    [id number]  Score  1   Score last updated 9/15/21 95:24 AM EDT    Click here for a link to the UpToDate guideline \"Atrial Fibrillation: Anticoagulation therapy to prevent embolization    Disclaimer: Risk Score calculation is dependent on accuracy of patient problem list and past encounter diagnosis. Patient did not bring medication bottles or list. Medications could not be verbally verified with the patient. Patient was reminded to bring medication bottles to appointments.

## 2021-09-15 NOTE — PATIENT INSTRUCTIONS
**It is YOUR responsibilty to bring medication bottles and/or updated medication list to [address] [place] [address]. This will allow us to better serve you and all your healthcare needs**    Please be informed that if you contact our office outside of normal business hours the physician on call cannot help with any scheduling or rescheduling issues, procedure instruction questions or any type of medication issue. We advise you for any urgent/emergency that you go to the nearest emergency room! PLEASE CALL OUR OFFICE DURING NORMAL BUSINESS HOURS    Monday - Friday   8 am to 5 pm    [place]: [phone]    [facility]: [phone]    [place]:  [phone]        Digoxin 0.25 mg bid for 2 days then 0.125 mg daily. Will discuss with Dr Arnold Rendon for possible atrial flutter ablation ASAP. Continue warfarin. Continue device check as per care link schedule. Follow-up in 3 months with ICD check, sooner if needed.

## 2021-09-15 NOTE — PROGRESS NOTES
Bismark Reyes (:  1955) is a 61 y.o. male,     Chief Complaint   Patient presents with    Atrial Fibrillation     Patient here today for 1 year office visit. Patient is a former smoker that isn't able to do any form of exercise. Patient dennies shortness of breath and edema.  Hyperlipidemia     Patient currently takes Lipitor 80mg daily.  Chest Pain     Patient complains of chest tightness with low blood pressures that lasts 30 minutes.  Dizziness     Patient complians of dizziness with low blood pressures lasting 30 minutes.  Palpitations     Patient complains of racing and skipping of his heart that lasts 2 hours. Patient is here for follow up for severe cardiomyopathy status post CRT ICD implantation and has known coronary artery disease status post intervention in  and history of paroxysmal atrial fibrillation on anticoagulation therapy. He is complaining about hypotension when he takes Cardizem and Cardizem has been on hold since Monday. He is a resident of nursing facility. Denies any unusual shortness of breath or leg swelling syncope or near syncope. Denies palpitations. He feels chest tightness when the blood pressure drops and apparently he gets a nitroglycerin for that and he feels better. We do not have the details from the nursing facility with regard to his medications. We reviewed his medications as per recent encounters. He is a good historian however does not remember details of the medications and he keeps referring to blood pressure pill and the only medication he was on on his last encounter was Cardizem 30 every 8 hours. ICD checks are consistent with paroxysmal atrial flutter/fibrillation.     Allergies   Allergen Reactions    Penicillins Swelling    Azithromycin     Clozapine [Clozapine] Swelling    Haldol [Haloperidol Lactate]     Insulins      Per ecf    Ketorolac Tromethamine     Niacin And Related     Ultram [Tramadol]     Zomig [Zolmitriptan]  Codeine Nausea And Vomiting    Depakote [Divalproex Sodium] Other (See Comments)     ' makes eyeball fall out\"    Haldol [Haloperidol Lactate]      Hand and leg tremors with palpitation    Hydrocodone Nausea And Vomiting    Ibuprofen Anxiety     Causes pt to pass out    Imitrex [Sumatriptan] Anxiety    Maxalt [Rizatriptan] Nausea And Vomiting     Makes HA worse    Neurontin [Gabapentin] Other (See Comments)     \" makes eyeballs shrink\"     Prior to Admission medications    Medication Sig Start Date End Date Taking? Authorizing Provider   digoxin (LANOXIN) 125 MCG tablet Take 1 tablet by mouth daily 2 tab every 12 hours x 48 hours then one daily. 9/15/21  Yes Addis Cordova MD   HYDROcodone-acetaminophen (NORCO) 5-325 MG per tablet 1 tablet 3 times daily as needed. 3/5/20   Historical Provider, MD   medroxyPROGESTERone (PROVERA) 10 MG tablet 1 tablet 2 times daily 3/5/20   Historical Provider, MD   ziprasidone (GEODON) 40 MG capsule 1 capsule 2 times daily 2/19/20   Historical Provider, MD   diltiazem (CARDIZEM) 30 MG tablet Take 1 tablet by mouth every 8 hours  Patient taking differently: Take 45 mg by mouth every 8 hours Pt takes 1.5 tablets daily 10/4/19   Tee Claudio MD   warfarin (COUMADIN) 2.5 MG tablet Take 2.5 mg by mouth Daily with supper    Historical Provider, MD   acetaminophen (TYLENOL) 325 MG tablet Take 650 mg by mouth every 4 hours as needed for Pain    Historical Provider, MD   ezetimibe (ZETIA) 10 MG tablet Take 10 mg by mouth daily    Historical Provider, MD   atorvastatin (LIPITOR) 80 MG tablet Take 1 tablet by mouth daily 4/18/17   Bennett Tracy MD   nitroGLYCERIN (NITROSTAT) 0.3 MG SL tablet Place 0.3 mg under the tongue every 5 minutes as needed for Chest pain    Historical Provider, MD   levothyroxine (SYNTHROID) 75 MCG tablet Take 75 mcg by mouth Daily.     Historical Provider, MD   benztropine (COGENTIN) 1 MG tablet Take 1 mg by mouth 2 times daily     Historical Provider, MD loratadine (CLARITIN) 10 MG tablet Take 1 tablet by mouth daily. 4/23/13   Dory Jennings MD     Past Medical History:   Diagnosis Date    A-fib [facility])     on Coumadin    Abdominal pain 3/22/2012    Acute on chronic renal failure (HCC)     Allergic rhinitis     Bipolar affective disorder unspecified     possible schziophrenia per pt- Seeing Dr. Sylvia Marcus of left shoulder 2007    s/p injection     CAD (coronary artery disease)     see Leonidas    Cardiomyopathy ([facility] [address]     NYHA FC II-III    CHF (NYHA class III, ACC/AHA stage C) ([facility] [address] 4/27/2012    Cholelithiasis 5/8/2012    per CT    Chronic kidney disease (CKD)     Chronic pain     on Ultram- dispensed at [facility] per CSP/psych program    Depression     Displacement of electrode lead of cardiac pacemaker 10/4/2019    10/4/2019 RV lead got pulled back in the connector    DJD (degenerative joint disease) of cervical spine     Elevated serum creatinine     Erectile dysfunction     Fractures     Left collar bone x8, left wrist, bilateral toes    GERD (gastroesophageal reflux disease)     GERD (gastroesophageal reflux disease)     H/O echocardiogram 03/26/15    EF 20% Mod dilated LV with severly reduced systolic function. pacer wire noted in right ventricle and right atrium.      Hiatal hernia 5/8/2012    per CT    History of nuclear stress test 05/01/2017    lexiscan-large anteroapical MI, no new changes, EF16%    Hypertension     Hypothyroid     Started by Dr. Zamzam Mckay Ischemic heart disease     Lipidemia 4/27/2012    Lithium toxicity     4/2015    MI, old 4/27/2012    Paronychia 3/12/2012    S/P cardiac cath 2/21/14    Schizoaffective disorder [facility])     old chart also gives hx of paranoid schizo    Unintentional weight loss 3/22/2012    Possible due to bipolar/tabby/ paranoia (sept 2012) with refusing to eat food from the grocery store believing it's being poisoned       Vitals:    09/15/21 1023   BP: Date    WBC 6.8 02/20/2020    HGB 13.4 02/20/2020    HCT 40.0 02/20/2020     02/20/2020     Lab Results   Component Value Date    CHOL 152 02/20/2020    TRIG 55 02/20/2020    HDL 42 02/20/2020    LDLCALC 99 02/20/2020    LDLDIRECT 148 (H) 05/13/2016     Lab Results   Component Value Date    BUN 20 02/20/2020    CREATININE 1.2 02/20/2020     02/20/2020    K 5.2 02/20/2020     Lab Results   Component Value Date    INR 1.08 01/15/2020     ASSESSMENT/PLAN:    1. Biventricular ICD (implantable cardioverter-defibrillator) in place  Assessment & Plan:  Device is working well and dual-chamber mode sensing 62% in both atrium and ventricle. 2. CAD- S/P LAD stents 2013 at [address] [place] [address]:  Has some chest tightness with hypotension after he takes Cardizem. He is not having any chest tightness today with tachycardia with heart rate of 125 bpm.  We will slow the heart rate down with digitalization until he is seen by EP for possible atrial flutter ablation or DC cardioversion and antiarrhythmic therapy . 3. PAF (paroxysmal atrial fibrillation) ([address]  Assessment & Plan:  Patient is in atrial flutter with rapid ventricular rate response seems to be tolerating fairly well today. We will continue anticoagulation therapy digitalized to control the rate and consider atrial flutter ablation or DC cardioversion as soon as possible. Case discussed with EP nurse. We will have him see Dr. Aleyda Proctor soon as possible. 4. Mixed hyperlipidemia  Assessment & Plan:  Last LDL was 99. LDL goal is less than 70 and he is already on maximum statin therapy. Repeat lipids and LFTs. Digoxin 0.25 mg bid for 2 days then 0.125 mg daily. Will discuss with Dr Aleyda Proctor for possible atrial flutter ablation ASAP. Continue warfarin. Continue device check as per care link schedule. Follow-up in 3 months with ICD check, sooner if needed. An electronic signature was used to authenticate this note.     --Kush Mullins

## 2021-09-15 NOTE — ASSESSMENT & PLAN NOTE
Last LDL was 99. LDL goal is less than 70 and he is already on maximum statin therapy. Repeat lipids and LFTs.

## 2021-10-04 ASSESSMENT — ENCOUNTER SYMPTOMS
CHEST TIGHTNESS: 0
PHOTOPHOBIA: 0
EYE PAIN: 0
WHEEZING: 0
ABDOMINAL PAIN: 0
COLOR CHANGE: 0
CONSTIPATION: 0
BACK PAIN: 0
NAUSEA: 0
COUGH: 0
BLOOD IN STOOL: 0
DIARRHEA: 0
SHORTNESS OF BREATH: 0
VOMITING: 0

## 2021-11-23 ENCOUNTER — APPOINTMENT (OUTPATIENT)
Dept: NUCLEAR MEDICINE | Age: 66
End: 2021-11-23
Payer: MEDICARE

## 2021-11-23 ENCOUNTER — HOSPITAL ENCOUNTER (OUTPATIENT)
Age: 66
Setting detail: OBSERVATION
Discharge: NURSING, REHAB OR LONG-TERM CARE | End: 2021-11-23
Attending: STUDENT IN AN ORGANIZED HEALTH CARE EDUCATION/TRAINING PROGRAM | Admitting: STUDENT IN AN ORGANIZED HEALTH CARE EDUCATION/TRAINING PROGRAM
Payer: MEDICARE

## 2021-11-23 ENCOUNTER — APPOINTMENT (OUTPATIENT)
Dept: GENERAL RADIOLOGY | Age: 66
End: 2021-11-23
Payer: MEDICARE

## 2021-11-23 VITALS
OXYGEN SATURATION: 96 % | BODY MASS INDEX: 23.87 KG/M2 | HEIGHT: 76 IN | SYSTOLIC BLOOD PRESSURE: 122 MMHG | TEMPERATURE: 98.9 F | HEART RATE: 67 BPM | RESPIRATION RATE: 18 BRPM | WEIGHT: 196 LBS | DIASTOLIC BLOOD PRESSURE: 68 MMHG

## 2021-11-23 DIAGNOSIS — R07.9 CHEST PAIN, UNSPECIFIED TYPE: Primary | ICD-10-CM

## 2021-11-23 LAB
ALBUMIN SERPL-MCNC: 4 GM/DL (ref 3.4–5)
ALP BLD-CCNC: 111 IU/L (ref 40–128)
ALT SERPL-CCNC: 9 U/L (ref 10–40)
ANION GAP SERPL CALCULATED.3IONS-SCNC: 10 MMOL/L (ref 4–16)
AST SERPL-CCNC: 12 IU/L (ref 15–37)
BASOPHILS ABSOLUTE: 0.1 K/CU MM
BASOPHILS RELATIVE PERCENT: 0.6 % (ref 0–1)
BILIRUB SERPL-MCNC: 0.3 MG/DL (ref 0–1)
BUN BLDV-MCNC: 17 MG/DL (ref 6–23)
CALCIUM SERPL-MCNC: 9.1 MG/DL (ref 8.3–10.6)
CHLORIDE BLD-SCNC: 101 MMOL/L (ref 99–110)
CHOLESTEROL: 197 MG/DL
CO2: 24 MMOL/L (ref 21–32)
CREAT SERPL-MCNC: 1.2 MG/DL (ref 0.9–1.3)
DIFFERENTIAL TYPE: ABNORMAL
EKG ATRIAL RATE: 60 BPM
EKG DIAGNOSIS: NORMAL
EKG P AXIS: 89 DEGREES
EKG P-R INTERVAL: 260 MS
EKG Q-T INTERVAL: 446 MS
EKG QRS DURATION: 166 MS
EKG QTC CALCULATION (BAZETT): 446 MS
EKG R AXIS: 259 DEGREES
EKG T AXIS: -72 DEGREES
EKG VENTRICULAR RATE: 60 BPM
EOSINOPHILS ABSOLUTE: 0.4 K/CU MM
EOSINOPHILS RELATIVE PERCENT: 4.8 % (ref 0–3)
GFR AFRICAN AMERICAN: >60 ML/MIN/1.73M2
GFR NON-AFRICAN AMERICAN: >60 ML/MIN/1.73M2
GLUCOSE BLD-MCNC: 87 MG/DL (ref 70–99)
HCT VFR BLD CALC: 39.1 % (ref 42–52)
HDLC SERPL-MCNC: 34 MG/DL
HEMOGLOBIN: 12.8 GM/DL (ref 13.5–18)
IMMATURE NEUTROPHIL %: 0.2 % (ref 0–0.43)
INR BLD: 2.68 INDEX
LDL CHOLESTEROL DIRECT: 145 MG/DL
LV EF: 10 %
LV EF: 18 %
LVEF MODALITY: NORMAL
LVEF MODALITY: NORMAL
LYMPHOCYTES ABSOLUTE: 2.6 K/CU MM
LYMPHOCYTES RELATIVE PERCENT: 31.8 % (ref 24–44)
MCH RBC QN AUTO: 31.6 PG (ref 27–31)
MCHC RBC AUTO-ENTMCNC: 32.7 % (ref 32–36)
MCV RBC AUTO: 96.5 FL (ref 78–100)
MONOCYTES ABSOLUTE: 0.8 K/CU MM
MONOCYTES RELATIVE PERCENT: 9.7 % (ref 0–4)
NUCLEATED RBC %: 0 %
PDW BLD-RTO: 14.1 % (ref 11.7–14.9)
PLATELET # BLD: 188 K/CU MM (ref 140–440)
PMV BLD AUTO: 10.4 FL (ref 7.5–11.1)
POTASSIUM SERPL-SCNC: 4.1 MMOL/L (ref 3.5–5.1)
PRO-BNP: 1223 PG/ML
PROTHROMBIN TIME: 34.9 SECONDS (ref 11.7–14.5)
RBC # BLD: 4.05 M/CU MM (ref 4.6–6.2)
SARS-COV-2, NAAT: NOT DETECTED
SEGMENTED NEUTROPHILS ABSOLUTE COUNT: 4.3 K/CU MM
SEGMENTED NEUTROPHILS RELATIVE PERCENT: 52.9 % (ref 36–66)
SODIUM BLD-SCNC: 135 MMOL/L (ref 135–145)
SOURCE: NORMAL
TOTAL IMMATURE NEUTOROPHIL: 0.02 K/CU MM
TOTAL NUCLEATED RBC: 0 K/CU MM
TOTAL PROTEIN: 6.5 GM/DL (ref 6.4–8.2)
TRIGL SERPL-MCNC: 107 MG/DL
TROPONIN T: <0.01 NG/ML
WBC # BLD: 8.2 K/CU MM (ref 4–10.5)

## 2021-11-23 PROCEDURE — 85025 COMPLETE CBC W/AUTO DIFF WBC: CPT

## 2021-11-23 PROCEDURE — 84484 ASSAY OF TROPONIN QUANT: CPT

## 2021-11-23 PROCEDURE — A9500 TC99M SESTAMIBI: HCPCS | Performed by: INTERNAL MEDICINE

## 2021-11-23 PROCEDURE — 36415 COLL VENOUS BLD VENIPUNCTURE: CPT

## 2021-11-23 PROCEDURE — 83721 ASSAY OF BLOOD LIPOPROTEIN: CPT

## 2021-11-23 PROCEDURE — 6360000002 HC RX W HCPCS: Performed by: INTERNAL MEDICINE

## 2021-11-23 PROCEDURE — 6370000000 HC RX 637 (ALT 250 FOR IP): Performed by: STUDENT IN AN ORGANIZED HEALTH CARE EDUCATION/TRAINING PROGRAM

## 2021-11-23 PROCEDURE — 83880 ASSAY OF NATRIURETIC PEPTIDE: CPT

## 2021-11-23 PROCEDURE — 78452 HT MUSCLE IMAGE SPECT MULT: CPT

## 2021-11-23 PROCEDURE — 80053 COMPREHEN METABOLIC PANEL: CPT

## 2021-11-23 PROCEDURE — 87635 SARS-COV-2 COVID-19 AMP PRB: CPT

## 2021-11-23 PROCEDURE — 2580000003 HC RX 258: Performed by: STUDENT IN AN ORGANIZED HEALTH CARE EDUCATION/TRAINING PROGRAM

## 2021-11-23 PROCEDURE — G0378 HOSPITAL OBSERVATION PER HR: HCPCS

## 2021-11-23 PROCEDURE — 94761 N-INVAS EAR/PLS OXIMETRY MLT: CPT

## 2021-11-23 PROCEDURE — 93306 TTE W/DOPPLER COMPLETE: CPT

## 2021-11-23 PROCEDURE — 99284 EMERGENCY DEPT VISIT MOD MDM: CPT

## 2021-11-23 PROCEDURE — 99214 OFFICE O/P EST MOD 30 MIN: CPT | Performed by: INTERNAL MEDICINE

## 2021-11-23 PROCEDURE — 93010 ELECTROCARDIOGRAM REPORT: CPT | Performed by: INTERNAL MEDICINE

## 2021-11-23 PROCEDURE — 85610 PROTHROMBIN TIME: CPT

## 2021-11-23 PROCEDURE — 93017 CV STRESS TEST TRACING ONLY: CPT

## 2021-11-23 PROCEDURE — 93005 ELECTROCARDIOGRAM TRACING: CPT | Performed by: EMERGENCY MEDICINE

## 2021-11-23 PROCEDURE — 80061 LIPID PANEL: CPT

## 2021-11-23 PROCEDURE — 3430000000 HC RX DIAGNOSTIC RADIOPHARMACEUTICAL: Performed by: INTERNAL MEDICINE

## 2021-11-23 PROCEDURE — 71045 X-RAY EXAM CHEST 1 VIEW: CPT

## 2021-11-23 RX ORDER — CETIRIZINE HYDROCHLORIDE 10 MG/1
10 TABLET ORAL DAILY
Refills: 3 | Status: DISCONTINUED | OUTPATIENT
Start: 2021-11-23 | End: 2021-11-23 | Stop reason: HOSPADM

## 2021-11-23 RX ORDER — ACETAMINOPHEN 325 MG/1
650 TABLET ORAL EVERY 6 HOURS PRN
Status: DISCONTINUED | OUTPATIENT
Start: 2021-11-23 | End: 2021-11-23 | Stop reason: HOSPADM

## 2021-11-23 RX ORDER — ACETAMINOPHEN 650 MG/1
650 SUPPOSITORY RECTAL EVERY 6 HOURS PRN
Status: DISCONTINUED | OUTPATIENT
Start: 2021-11-23 | End: 2021-11-23 | Stop reason: HOSPADM

## 2021-11-23 RX ORDER — TRAMADOL HYDROCHLORIDE 50 MG/1
1 TABLET ORAL EVERY 6 HOURS PRN
COMMUNITY
Start: 2021-07-01

## 2021-11-23 RX ORDER — ONDANSETRON 4 MG/1
4 TABLET, ORALLY DISINTEGRATING ORAL EVERY 8 HOURS PRN
Status: DISCONTINUED | OUTPATIENT
Start: 2021-11-23 | End: 2021-11-23 | Stop reason: HOSPADM

## 2021-11-23 RX ORDER — SODIUM CHLORIDE 0.9 % (FLUSH) 0.9 %
5-40 SYRINGE (ML) INJECTION EVERY 12 HOURS SCHEDULED
Status: DISCONTINUED | OUTPATIENT
Start: 2021-11-23 | End: 2021-11-23 | Stop reason: HOSPADM

## 2021-11-23 RX ORDER — SODIUM CHLORIDE 0.9 % (FLUSH) 0.9 %
5-40 SYRINGE (ML) INJECTION PRN
Status: DISCONTINUED | OUTPATIENT
Start: 2021-11-23 | End: 2021-11-23 | Stop reason: HOSPADM

## 2021-11-23 RX ORDER — EZETIMIBE 10 MG/1
10 TABLET ORAL DAILY
Status: DISCONTINUED | OUTPATIENT
Start: 2021-11-23 | End: 2021-11-23 | Stop reason: HOSPADM

## 2021-11-23 RX ORDER — ZIPRASIDONE HYDROCHLORIDE 40 MG/1
40 CAPSULE ORAL 2 TIMES DAILY WITH MEALS
Status: DISCONTINUED | OUTPATIENT
Start: 2021-11-23 | End: 2021-11-23 | Stop reason: HOSPADM

## 2021-11-23 RX ORDER — POLYETHYLENE GLYCOL 3350 17 G/17G
17 POWDER, FOR SOLUTION ORAL DAILY PRN
Status: DISCONTINUED | OUTPATIENT
Start: 2021-11-23 | End: 2021-11-23 | Stop reason: HOSPADM

## 2021-11-23 RX ORDER — BENZTROPINE MESYLATE 1 MG/1
1 TABLET ORAL 2 TIMES DAILY
Status: DISCONTINUED | OUTPATIENT
Start: 2021-11-23 | End: 2021-11-23 | Stop reason: HOSPADM

## 2021-11-23 RX ORDER — NITROGLYCERIN 0.4 MG/1
0.4 TABLET SUBLINGUAL EVERY 5 MIN PRN
Status: DISCONTINUED | OUTPATIENT
Start: 2021-11-23 | End: 2021-11-23 | Stop reason: HOSPADM

## 2021-11-23 RX ORDER — METOPROLOL SUCCINATE 25 MG/1
25 TABLET, EXTENDED RELEASE ORAL DAILY
Status: DISCONTINUED | OUTPATIENT
Start: 2021-11-23 | End: 2021-11-23 | Stop reason: HOSPADM

## 2021-11-23 RX ORDER — DILTIAZEM HCL 90 MG
45 TABLET ORAL DAILY
Status: DISCONTINUED | OUTPATIENT
Start: 2021-11-23 | End: 2021-11-23 | Stop reason: HOSPADM

## 2021-11-23 RX ORDER — WARFARIN SODIUM 5 MG/1
2.5 TABLET ORAL
Status: DISCONTINUED | OUTPATIENT
Start: 2021-11-23 | End: 2021-11-23 | Stop reason: HOSPADM

## 2021-11-23 RX ORDER — LEVOTHYROXINE SODIUM 0.07 MG/1
75 TABLET ORAL DAILY
Status: DISCONTINUED | OUTPATIENT
Start: 2021-11-23 | End: 2021-11-23 | Stop reason: HOSPADM

## 2021-11-23 RX ORDER — ASPIRIN 81 MG/1
81 TABLET, CHEWABLE ORAL DAILY
Status: DISCONTINUED | OUTPATIENT
Start: 2021-11-24 | End: 2021-11-23 | Stop reason: HOSPADM

## 2021-11-23 RX ORDER — SODIUM CHLORIDE 9 MG/ML
25 INJECTION, SOLUTION INTRAVENOUS PRN
Status: DISCONTINUED | OUTPATIENT
Start: 2021-11-23 | End: 2021-11-23 | Stop reason: HOSPADM

## 2021-11-23 RX ORDER — TRAMADOL HYDROCHLORIDE 50 MG/1
50 TABLET ORAL EVERY 6 HOURS PRN
Status: DISCONTINUED | OUTPATIENT
Start: 2021-11-23 | End: 2021-11-23 | Stop reason: HOSPADM

## 2021-11-23 RX ORDER — ONDANSETRON 2 MG/ML
4 INJECTION INTRAMUSCULAR; INTRAVENOUS EVERY 6 HOURS PRN
Status: DISCONTINUED | OUTPATIENT
Start: 2021-11-23 | End: 2021-11-23 | Stop reason: HOSPADM

## 2021-11-23 RX ORDER — ATORVASTATIN CALCIUM 80 MG/1
80 TABLET, FILM COATED ORAL DAILY
Status: DISCONTINUED | OUTPATIENT
Start: 2021-11-23 | End: 2021-11-23 | Stop reason: HOSPADM

## 2021-11-23 RX ORDER — DIGOXIN 125 MCG
125 TABLET ORAL DAILY
Status: DISCONTINUED | OUTPATIENT
Start: 2021-11-23 | End: 2021-11-23 | Stop reason: HOSPADM

## 2021-11-23 RX ORDER — MEDROXYPROGESTERONE ACETATE 10 MG/1
10 TABLET ORAL 2 TIMES DAILY
Status: DISCONTINUED | OUTPATIENT
Start: 2021-11-23 | End: 2021-11-23 | Stop reason: ALTCHOICE

## 2021-11-23 RX ADMIN — CETIRIZINE HYDROCHLORIDE 10 MG: 10 TABLET, FILM COATED ORAL at 08:56

## 2021-11-23 RX ADMIN — METOPROLOL SUCCINATE 25 MG: 25 TABLET, EXTENDED RELEASE ORAL at 10:51

## 2021-11-23 RX ADMIN — ACETAMINOPHEN 650 MG: 325 TABLET ORAL at 07:14

## 2021-11-23 RX ADMIN — KIT FOR THE PREPARATION OF TECHNETIUM TC99M SESTAMIBI 10 MILLICURIE: 1 INJECTION, POWDER, LYOPHILIZED, FOR SOLUTION PARENTERAL at 11:20

## 2021-11-23 RX ADMIN — TRAMADOL HYDROCHLORIDE 50 MG: 50 TABLET, FILM COATED ORAL at 17:08

## 2021-11-23 RX ADMIN — REGADENOSON 0.4 MG: 0.08 INJECTION, SOLUTION INTRAVENOUS at 09:36

## 2021-11-23 RX ADMIN — LEVOTHYROXINE SODIUM 75 MCG: 0.07 TABLET ORAL at 08:51

## 2021-11-23 RX ADMIN — EZETIMIBE 10 MG: 10 TABLET ORAL at 08:50

## 2021-11-23 RX ADMIN — DILTIAZEM HYDROCHLORIDE 45 MG: 90 TABLET, FILM COATED ORAL at 10:51

## 2021-11-23 RX ADMIN — DIGOXIN 125 MCG: 125 TABLET ORAL at 10:50

## 2021-11-23 RX ADMIN — KIT FOR THE PREPARATION OF TECHNETIUM TC99M SESTAMIBI 30 MILLICURIE: 1 INJECTION, POWDER, LYOPHILIZED, FOR SOLUTION PARENTERAL at 11:19

## 2021-11-23 RX ADMIN — ZIPRASIDONE HYDROCHLORIDE 40 MG: 40 CAPSULE ORAL at 17:08

## 2021-11-23 RX ADMIN — TRAMADOL HYDROCHLORIDE 50 MG: 50 TABLET, FILM COATED ORAL at 07:14

## 2021-11-23 RX ADMIN — Medication 10 ML: at 08:56

## 2021-11-23 RX ADMIN — ZIPRASIDONE HYDROCHLORIDE 40 MG: 40 CAPSULE ORAL at 08:51

## 2021-11-23 RX ADMIN — MEDROXYPROGESTERONE ACETATE 10 MG: 10 TABLET ORAL at 08:51

## 2021-11-23 RX ADMIN — BENZTROPINE MESYLATE 1 MG: 1 TABLET ORAL at 08:50

## 2021-11-23 RX ADMIN — ATORVASTATIN CALCIUM 80 MG: 80 TABLET, FILM COATED ORAL at 08:51

## 2021-11-23 ASSESSMENT — ENCOUNTER SYMPTOMS
NAUSEA: 0
VOMITING: 0
COUGH: 0
SHORTNESS OF BREATH: 1
CHEST TIGHTNESS: 0
RHINORRHEA: 0
SORE THROAT: 0
BLOOD IN STOOL: 0
DIARRHEA: 0
ABDOMINAL PAIN: 0
WHEEZING: 0
COLOR CHANGE: 0

## 2021-11-23 ASSESSMENT — HEART SCORE: ECG: 1

## 2021-11-23 ASSESSMENT — PAIN SCALES - GENERAL
PAINLEVEL_OUTOF10: 7
PAINLEVEL_OUTOF10: 4
PAINLEVEL_OUTOF10: 5

## 2021-11-23 NOTE — H&P
History and Physical      Name:  Michelle Drake /Age/Sex: 1955  (61 y.o. male)   MRN & CSN:  [id number] & [id number] Admission Date/Time: 2021  3:13 AM   Location:  [facility] PCP: Coleen Ferris MD       Hospital Day: 1    Assessment and Plan:   Michelle Drake is a 61 y.o.  male  who presents with Chest pain    Chest pain, rule out acute coronary syndrome, with h/o coronary artery disease  Tobacco abuse  -Admit to observation with telemetry  -Status post PCI to LAD   -Troponin <0.010 in ED, Cycle troponin x2 q6  -Continue ASA, Beta blocker, and statin  -Check lipid panel  -SL nitro prn  -Nasal cannula oxygen prn  -N. p.o  -2D Echo in AM  -ED consulted cardiology, appreciate recommendations  -Educated on importance of smoking cessation    AOCD  -Hemoglobin 12.8  -Baseline 13  -No evidence of hemorrhage, monitor and transfuse if hemoglobin less than 7    Other chronic medical conditions:   Continue all home meds except stated above or contraindicated. PAF  Bipolar affective disorder  Cardiomyopathy status post BiV  HFrEF: Not currently in exacerbation  GERD  Hyperlipidemia  Hypertension  Chronic pain  Seasonal allergies    Diet Diet NPO   DVT Prophylaxis [x] home Coumadin, []  Heparin, [] SCDs, [] Ambulation   GI Prophylaxis [] PPI,  [] H2 Blocker,  [] Carafate,  [] Diet/Tube Feeds   Code Status Full Code   Disposition Patient requires continued admission due to Chest pain   MDM [] Low, [] Moderate,[x]  High  Patient's risk as above due to acuity of condition with potential for decompensation. History of Present Illness:     Chief Complaint: Chest pain    Michelle Drake is a 61 y.o.  male with a reviewed and a noncontributory family history and a PMH as stated above, who presents with complaints of chest pain. Onset was 5 hours ago, with resolved course since that time. The patient describes the pain as constant, sharp in nature, radiates to the left arm.  Patient rates pain as a 10/10 in intensity. Associated symptoms are dyspnea. Aggravating factors are none. Alleviating factors are: nitroglycerin 1 tablets and 4-81 mg asa. Patient's cardiac risk factors are advanced age (older than 54 for men, 72 for women), dyslipidemia, hypertension, male gender and smoking/ tobacco exposure. Otherwise patient denies fever, chills, headache, shortness of breath, cough, abdominal pain, nausea, vomiting, diarrhea, dark tarry stools, blood per rectum, dysuria, frequency, or urgency. Discussed case with ED provider. ROS:   Review of Systems   Constitutional: Negative for appetite change, diaphoresis, fatigue and fever. HENT: Negative for congestion, rhinorrhea and sore throat. Eyes: Negative for visual disturbance. Respiratory: Positive for shortness of breath. Negative for cough, chest tightness and wheezing. Cardiovascular: Positive for chest pain. Negative for palpitations and leg swelling. Gastrointestinal: Negative for abdominal pain, blood in stool, diarrhea, nausea and vomiting. Genitourinary: Negative for dysuria, frequency, hematuria and urgency. Musculoskeletal: Negative for arthralgias. Skin: Negative for color change and rash. Neurological: Negative for dizziness, seizures, weakness, numbness and headaches. Psychiatric/Behavioral: Negative for confusion. Objective:   No intake or output data in the 24 hours ending 11/23/21 0510   Vitals:   Vitals:    11/23/21 0451   BP: 121/72   Pulse: 60   Resp:    Temp:    SpO2: 99%     /72   Pulse 60   Temp 98.9 °F (37.2 °C) (Oral)   Resp 16   Ht 6' 4\" (1.93 m)   Wt 196 lb (88.9 kg)   SpO2 99%   BMI 23.86 kg/m²   Physical Exam:   Physical Exam  Vitals and nursing note reviewed. Constitutional:       General: He is awake. He is not in acute distress. Appearance: Normal appearance. He is normal weight. He is not ill-appearing, toxic-appearing or diaphoretic. Interventions: He is not intubated.   HENT: rhinitis     Bipolar affective disorder unspecified     possible schziophrenia per pt- Seeing Dr. Tono Beavers of left shoulder 2007    s/p injection     CAD (coronary artery disease)     see Leonidas    Cardiomyopathy ([facility] [address]     NYHA FC II-III    CHF (NYHA class III, ACC/AHA stage C) ([facility] [address] 4/27/2012    Cholelithiasis 5/8/2012    per CT    Chronic kidney disease (CKD)     Chronic pain     on Ultram- dispensed at [facility] per CSP/psych program    Depression     Displacement of electrode lead of cardiac pacemaker 10/4/2019    10/4/2019 RV lead got pulled back in the connector    DJD (degenerative joint disease) of cervical spine     Elevated serum creatinine     Erectile dysfunction     Fractures     Left collar bone x8, left wrist, bilateral toes    GERD (gastroesophageal reflux disease)     GERD (gastroesophageal reflux disease)     H/O echocardiogram 03/26/15    EF 20% Mod dilated LV with severly reduced systolic function. pacer wire noted in right ventricle and right atrium.  Hiatal hernia 5/8/2012    per CT    History of nuclear stress test 05/01/2017    lexiscan-large anteroapical MI, no new changes, EF16%    Hypertension     Hypothyroid     Started by Dr. Artem Moyer Ischemic heart disease     Lipidemia 4/27/2012    Lithium toxicity     4/2015    MI, old 4/27/2012    Paronychia 3/12/2012    S/P cardiac cath 2/21/14    Schizoaffective disorder [facility])     old chart also gives hx of paranoid schizo    Unintentional weight loss 3/22/2012    Possible due to bipolar/tabby/ paranoia (sept 2012) with refusing to eat food from the grocery store believing it's being poisoned      PSHX:  has a past surgical history that includes sinus surgery; Tonsillectomy; Cardiac surgery; Cardiac catheterization; angioplasty; pacemaker placement; Endoscopy, colon, diagnostic (9/2015); Gastric fundoplication (34/01/6826); hiatal hernia repair (10/7/15); and Cholecystectomy.     Allergies: Allergies   Allergen Reactions    Penicillins Swelling    Azithromycin     Clozapine [Clozapine] Swelling    Haldol [Haloperidol Lactate]     Insulins      Per ecf    Ketorolac Tromethamine     Niacin And Related     Ultram [Tramadol]     Zomig [Zolmitriptan]     Codeine Nausea And Vomiting    Depakote [Divalproex Sodium] Other (See Comments)     ' makes eyeball fall out\"    Haldol [Haloperidol Lactate]      Hand and leg tremors with palpitation    Hydrocodone Nausea And Vomiting    Ibuprofen Anxiety     Causes pt to pass out    Imitrex [Sumatriptan] Anxiety    Maxalt [Rizatriptan] Nausea And Vomiting     Makes HA worse    Neurontin [Gabapentin] Other (See Comments)     \" makes eyeballs shrink\"       FAM HX: family history includes Mental Illness in his mother, sister, and sister. Soc HX:   Social History     Socioeconomic History    Marital status:      Spouse name: None    Number of children: None    Years of education: None    Highest education level: None   Occupational History    None   Tobacco Use    Smoking status: Current Every Day Smoker     Packs/day: 0.50     Years: 46.00     Pack years: 23.00     Types: Cigarettes    Smokeless tobacco: Never Used   Vaping Use    Vaping Use: Never used   Substance and Sexual Activity    Alcohol use: No     Alcohol/week: 0.0 standard drinks    Drug use: No    Sexual activity: Not Currently   Other Topics Concern    None   Social History Narrative    None     Social Determinants of Health     Financial Resource Strain:     Difficulty of Paying Living Expenses: Not on file   Food Insecurity:     Worried About Running Out of Food in the Last Year: Not on file    Yimi of Food in the Last Year: Not on file   Transportation Needs:     Lack of Transportation (Medical): Not on file    Lack of Transportation (Non-Medical):  Not on file   Physical Activity:     Days of Exercise per Week: Not on file    Minutes of Exercise per Session: Not on file   Stress:     Feeling of Stress : Not on file   Social Connections:     Frequency of Communication with Friends and Family: Not on file    Frequency of Social Gatherings with Friends and Family: Not on file    Attends Cheondoism Services: Not on file    Active Member of Clubs or Organizations: Not on file    Attends Club or Organization Meetings: Not on file    Marital Status: Not on file   Intimate Partner Violence:     Fear of Current or Ex-Partner: Not on file    Emotionally Abused: Not on file    Physically Abused: Not on file    Sexually Abused: Not on file   Housing Stability:     Unable to Pay for Housing in the Last Year: Not on file    Number of Places Lived in the Last Year: Not on file    Unstable Housing in the Last Year: Not on file       Data:   CBC with Differential:    Lab Results   Component Value Date    WBC 8.2 11/23/2021    RBC 4.05 11/23/2021    HGB 12.8 11/23/2021    HCT 39.1 11/23/2021     11/23/2021    MCV 96.5 11/23/2021    MCH 31.6 11/23/2021    MCHC 32.7 11/23/2021    RDW 14.1 11/23/2021    SEGSPCT 52.9 11/23/2021    BANDSPCT 1 04/16/2015    LYMPHOPCT 31.8 11/23/2021    MONOPCT 9.7 11/23/2021    EOSPCT 9.5 05/01/2012    BASOPCT 0.6 11/23/2021    MONOSABS 0.8 11/23/2021    LYMPHSABS 2.6 11/23/2021    EOSABS 0.4 11/23/2021    BASOSABS 0.1 11/23/2021    DIFFTYPE AUTOMATED DIFFERENTIAL 11/23/2021       CMP:     Lab Results   Component Value Date     11/23/2021    K 4.1 11/23/2021     11/23/2021    CO2 24 11/23/2021    BUN 17 11/23/2021    CREATININE 1.2 11/23/2021    GFRAA >60 11/23/2021    LABGLOM >60 11/23/2021    GLUCOSE 87 11/23/2021    PROT 6.5 11/23/2021    PROT 6.8 03/20/2013    LABALBU 4.0 11/23/2021    CALCIUM 9.1 11/23/2021    BILITOT 0.3 11/23/2021    ALKPHOS 111 11/23/2021    AST 12 11/23/2021    ALT 9 11/23/2021       Troponin:  Lab Results   Component Value Date    TROPONINT <0.010 11/23/2021     Radiology results:  XR CHEST PORTABLE   Final Result   Increased lung markings bilaterally, may be related to mild pulmonary   vascular congestion versus bronchitis. Medications:   Home Medications:   Prior to Admission medications    Medication Sig Start Date End Date Taking? Authorizing Provider   traMADol (ULTRAM) 50 MG tablet Take 1 tablet by mouth every 6 hours as needed for Pain Moderate (4-6). 7/1/21  Yes Historical Provider, MD   digoxin (LANOXIN) 125 MCG tablet Take 1 tablet by mouth daily 2 tab every 12 hours x 48 hours then one daily. 9/15/21   Samia Vines MD   metoprolol succinate (TOPROL XL) 25 MG extended release tablet Take 1 tablet by mouth daily 9/15/21   Jimy Smith MD   HYDROcodone-acetaminophen (NORCO) 5-325 MG per tablet 1 tablet 3 times daily as needed. 3/5/20   Historical Provider, MD   medroxyPROGESTERone (PROVERA) 10 MG tablet 1 tablet 2 times daily 3/5/20   Historical Provider, MD   ziprasidone (GEODON) 40 MG capsule 1 capsule 2 times daily 2/19/20   Historical Provider, MD   diltiazem (CARDIZEM) 30 MG tablet Take 1 tablet by mouth every 8 hours  Patient taking differently: Take 45 mg by mouth every 8 hours Pt takes 1.5 tablets daily 10/4/19   Javi Nogueira MD   warfarin (COUMADIN) 2.5 MG tablet Take 2.5 mg by mouth Daily with supper    Historical Provider, MD   acetaminophen (TYLENOL) 325 MG tablet Take 650 mg by mouth every 4 hours as needed for Pain    Historical Provider, MD   ezetimibe (ZETIA) 10 MG tablet Take 10 mg by mouth daily    Historical Provider, MD   atorvastatin (LIPITOR) 80 MG tablet Take 1 tablet by mouth daily 4/18/17   Laurie Cuellar MD   nitroGLYCERIN (NITROSTAT) 0.3 MG SL tablet Place 0.3 mg under the tongue every 5 minutes as needed for Chest pain    Historical Provider, MD   levothyroxine (SYNTHROID) 75 MCG tablet Take 75 mcg by mouth Daily.     Historical Provider, MD   benztropine (COGENTIN) 1 MG tablet Take 1 mg by mouth 2 times daily     Historical Provider, MD loratadine (CLARITIN) 10 MG tablet Take 1 tablet by mouth daily. 4/23/13   Farheen Ward MD     Medications:    Infusions:   PRN Meds:     Electronically signed by Anthony Sim DO on 11/23/2021 at 5:10 AM      This dictation was created with voice recognition software. While attempts have been made to review the dictation as it is transcribed, on occasion the spoken word can be misinterpreted by the technology leading to omissions or inappropriate words, phrases or sentences.

## 2021-11-23 NOTE — CONSULTS
Chart reviewed  Full note to follow                      Name:  Sabra Harrell /Age/Sex: 1955  (61 y.o. male)   MRN & CSN:  [id number] & [id number] Admission Date/Time: 2021  3:13 AM   Location:   PCP: Rylie Vargas MD       Hospital Day: 1          Referring physician:  Herlinda Yen DO         Reason for consultation:  cp        Thanks for referral.    Information source: patient    CC;  cp      HPI:   Thank you for involving me in taking  care of Sabra Harrell who  is a 61 y. o.year  Old male  Presents with  H/o pci, cad, icd, HFref now with mod mid sternal recurrent CP felt pressure came to ED [facility] here is neg so far  . 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 Past surgical history:   has a past surgical history that includes sinus surgery; Tonsillectomy; Cardiac surgery; Cardiac catheterization; angioplasty; pacemaker placement; Endoscopy, colon, diagnostic (2015); Gastric fundoplication (); hiatal hernia repair (10/7/15); and Cholecystectomy. Social History:   reports that he has been smoking cigarettes.  He has a 23.00 pack-year smoking history. He has never used smokeless tobacco. He reports that he does not drink alcohol and does not use drugs. Family history:  family history includes Mental Illness in his mother, sister, and sister.     Allergies   Allergen Reactions    Penicillins Swelling    Azithromycin     Clozapine [Clozapine] Swelling    Haldol [Haloperidol Lactate]     Insulins      Per ecf    Ketorolac Tromethamine     Niacin And Related     Ultram [Tramadol]     Zomig [Zolmitriptan]     Codeine Nausea And Vomiting    Depakote [Divalproex Sodium] Other (See Comments)     ' makes eyeball fall out\"    Haldol [Haloperidol Lactate]      Hand and leg tremors with palpitation    Hydrocodone Nausea And Vomiting    Ibuprofen Anxiety     Causes pt to pass out    Imitrex [Sumatriptan] Anxiety    Maxalt [Rizatriptan] Nausea And Vomiting     Makes HA worse    Neurontin [Gabapentin] Other (See Comments)     \" makes eyeballs shrink\"       atorvastatin (LIPITOR) tablet 80 mg, Daily  benztropine (COGENTIN) tablet 1 mg, BID  digoxin (LANOXIN) tablet 125 mcg, Daily  dilTIAZem (CARDIZEM) tablet 45 mg, Daily  ezetimibe (ZETIA) tablet 10 mg, Daily  levothyroxine (SYNTHROID) tablet 75 mcg, Daily  cetirizine (ZYRTEC) tablet 10 mg, Daily  medroxyPROGESTERone (PROVERA) tablet 10 mg, BID  metoprolol succinate (TOPROL XL) extended release tablet 25 mg, Daily  warfarin (COUMADIN) tablet 2.5 mg, Dinner  ziprasidone (GEODON) capsule 40 mg, BID  traMADol (ULTRAM) tablet 50 mg, Q6H PRN  sodium chloride flush 0.9 % injection 5-40 mL, 2 times per day  sodium chloride flush 0.9 % injection 5-40 mL, PRN  0.9 % sodium chloride infusion, PRN  ondansetron (ZOFRAN-ODT) disintegrating tablet 4 mg, Q8H PRN   Or  ondansetron (ZOFRAN) injection 4 mg, Q6H PRN  acetaminophen (TYLENOL) tablet 650 mg, Q6H PRN   Or  acetaminophen (TYLENOL) suppository 650 mg, Q6H PRN  polyethylene glycol (GLYCOLAX) packet 17 g, Daily PRN  [START ON 11/24/2021] aspirin chewable tablet 81 mg, Daily  nitroGLYCERIN (NITROSTAT) SL tablet 0.4 mg, Q5 Min PRN      Current Facility-Administered Medications   Medication Dose Route Frequency Provider Last Rate Last Admin    atorvastatin (LIPITOR) tablet 80 mg  80 mg Oral Daily Tenzin Barbosa,         benztropine (COGENTIN) tablet 1 mg  1 mg Oral BID Philemon Mixer, DO        digoxin (LANOXIN) tablet 125 mcg  125 mcg Oral Daily Philemon Mixer, DO        dilTIAZem (CARDIZEM) tablet 45 mg  45 mg Oral Daily Philemon Mixer, DO        ezetimibe (ZETIA) tablet 10 mg  10 mg Oral Daily Philemon Mixer, DO        levothyroxine (SYNTHROID) tablet 75 mcg  75 mcg Oral Daily Philemon Mixer, DO        cetirizine (ZYRTEC) tablet 10 mg  10 mg Oral Daily Philemon Mixer, DO        medroxyPROGESTERone (PROVERA) tablet 10 mg  10 mg Oral BID Philemon Mixer, DO        metoprolol succinate (TOPROL XL) extended release tablet 25 mg  25 mg Oral Daily Philemon Mixer, DO        warfarin (COUMADIN) tablet 2.5 mg  2.5 mg Oral Dinner Philemon Mixer, DO        ziprasidone (GEODON) capsule 40 mg  40 mg Oral BID Philemon Mixer, DO        traMADol (ULTRAM) tablet 50 mg  50 mg Oral Q6H PRN Philemon Mixer, DO        sodium chloride flush 0.9 % injection 5-40 mL  5-40 mL IntraVENous 2 times per day Philemon Mixer, DO        sodium chloride flush 0.9 % injection 5-40 mL  5-40 mL IntraVENous PRN Philemon Mixer, DO        0.9 % sodium chloride infusion  25 mL IntraVENous PRN Philemon Mixer, DO        ondansetron (ZOFRAN-ODT) disintegrating tablet 4 mg  4 mg Oral Q8H PRN Philemon Mixer, DO        Or    ondansetron (ZOFRAN) injection 4 mg  4 mg IntraVENous Q6H PRN Philemon Mixer, DO        acetaminophen (TYLENOL) tablet 650 mg  650 mg Oral Q6H PRN Philemon Mixer, DO        Or    acetaminophen (TYLENOL) suppository 650 mg  650 mg Rectal Q6H PRN Philemon Mixer, DO        polyethylene glycol (GLYCOLAX) packet 17 g  17 g Oral Daily PRN Tenzin Barbosa DO        [START ON 11/24/2021] aspirin chewable tablet 81 mg  81 mg Oral Daily Jon Muñoz DO        nitroGLYCERIN (NITROSTAT) SL tablet 0.4 mg  0.4 mg SubLINGual Q5 Min PRN Jon Muñoz DO         Current Outpatient Medications   Medication Sig Dispense Refill    traMADol (ULTRAM) 50 MG tablet Take 1 tablet by mouth every 6 hours as needed for Pain Moderate (4-6).  digoxin (LANOXIN) 125 MCG tablet Take 1 tablet by mouth daily 2 tab every 12 hours x 48 hours then one daily. 30 tablet 3    metoprolol succinate (TOPROL XL) 25 MG extended release tablet Take 1 tablet by mouth daily 30 tablet 3    HYDROcodone-acetaminophen (NORCO) 5-325 MG per tablet 1 tablet 3 times daily as needed.  medroxyPROGESTERone (PROVERA) 10 MG tablet 1 tablet 2 times daily      ziprasidone (GEODON) 40 MG capsule 1 capsule 2 times daily      diltiazem (CARDIZEM) 30 MG tablet Take 1 tablet by mouth every 8 hours (Patient taking differently: Take 45 mg by mouth every 8 hours Pt takes 1.5 tablets daily) 120 tablet 3    warfarin (COUMADIN) 2.5 MG tablet Take 2.5 mg by mouth Daily with supper      acetaminophen (TYLENOL) 325 MG tablet Take 650 mg by mouth every 4 hours as needed for Pain      ezetimibe (ZETIA) 10 MG tablet Take 10 mg by mouth daily      atorvastatin (LIPITOR) 80 MG tablet Take 1 tablet by mouth daily 90 tablet 3    nitroGLYCERIN (NITROSTAT) 0.3 MG SL tablet Place 0.3 mg under the tongue every 5 minutes as needed for Chest pain      levothyroxine (SYNTHROID) 75 MCG tablet Take 75 mcg by mouth Daily.  benztropine (COGENTIN) 1 MG tablet Take 1 mg by mouth 2 times daily       loratadine (CLARITIN) 10 MG tablet Take 1 tablet by mouth daily.  90 tablet 3     Review of Systems:  All 14 systems reviewed, all negative except for  CP    Physical Examination:    /72   Pulse 60   Temp 98.9 °F (37.2 °C) (Oral)   Resp 16   Ht 6' 4\" (1.93 m)   Wt 196 lb (88.9 kg)   SpO2 99%   BMI 23.86 kg/m²      Wt Readings from Last 3 Encounters:   11/23/21 196 lb (88.9 kg)   09/15/21 196 lb 12.8 oz (89.3 kg)   09/15/21 196 lb 12.8 oz (89.3 kg)     Body mass index is 23.86 kg/m². General Appearance:  Fair   Head: normocephalic     Eyes: normal, noninjected conjunctiva    ENT: normal mucosa, noninjected throat, normal     NECK: No JVP  No thyromegaly        Cardiovascular: No thrills palpated   Auscultation: Normal S1 and S2,  no murmur   carotid bruit no   Abdominal Aorta no bruit    Respiratory:    Breath sounds Clear = 0    Extremities:  none Edema clubbing ,   no cyanosis    SKIN: Warm and well perfused, no pallor or cyanosis    Vascular exam:  Pedal Pulses: palp  bilaterally        Abdomen:  No masses or tenderness. No organomegaly noted. Neurological:  Oriented to time, place, and person   No focal neurological deficit noted. Psychiatric:normal mood, no anxiety    Lab Review   Recent Labs     11/23/21  0321   WBC 8.2   HGB 12.8*   HCT 39.1*         Recent Labs     11/23/21  0321      K 4.1      CO2 24   BUN 17   CREATININE 1.2     Recent Labs     11/23/21 0321   AST 12*   ALT 9*   BILITOT 0.3   ALKPHOS 111     No results for input(s): TROPONINI in the last 72 hours.   Lab Results   Component Value Date    BNP 73 03/08/2012     (H) 02/13/2012    BNP 84 05/25/2011     Lab Results   Component Value Date    INR 1.08 01/15/2020    PROTIME 13.1 01/15/2020           Assessment/Recommendations:     - CP; serial trops, EKGs stress today  - risk factor modification  - ICD recently checked         Delma Mcclellan MD, 11/23/2021 5:30 AM

## 2021-11-23 NOTE — ED TRIAGE NOTES
Patient started having chest pain a 0130 today. Nurse at [facility] could not give him Nitro, so patient called ems. EMS gave patient nitro and 324mg ASA.

## 2021-11-23 NOTE — DISCHARGE INSTR - COC
Colostomy/Ileostomy/Ileal Conduit: No       Date of Last BM: ***    Intake/Output Summary (Last 24 hours) at 2021 1349  Last data filed at 2021 0856  Gross per 24 hour   Intake 10 ml   Output --   Net 10 ml     No intake/output data recorded.     Safety Concerns:     None    Impairments/Disabilities:      None      Patient's personal belongings (please select all that are sent with patient):  None    RN SIGNATURE:  Electronically signed by Adrian Kwon RN on 21 at 6:23 PM EST    CASE MANAGEMENT/SOCIAL WORK SECTION    Inpatient Status Date: ***    Readmission Risk Assessment Score:  Readmission Risk              Risk of Unplanned Readmission:  0           Discharging to Facility/ Agency   Name:   Address:  Phone:  Fax:    Dialysis Facility (if applicable)   Name:  Address:  Dialysis Schedule:  Phone:  Fax:    / signature: {Esignature:[id number]}    PHYSICIAN SECTION  Nutrition Therapy:  Current Nutrition Therapy:   [facility] DARY Diet ZRGW:639548175}    Routes of Feeding: {CHP DME Other Feedings:940000060}  Liquids: {Slp liquid thickness:14843}  Daily Fluid Restriction: {CHP DME Yes amt example:669648435}  Last Modified Barium Swallow with Video (Video Swallowing Test): {Done Not Done VSUW:642295697}    Treatments at the Time of Hospital Discharge:   Respiratory Treatments: ***  Oxygen Therapy:  {Therapy; copd oxygen:58994}  Ventilator:    { CC Vent OTBZ:211487543}    Rehab Therapies: {THERAPEUTIC INTERVENTION:9678697253}  Weight Bearing Status/Restrictions: [facility] Weight Bearin}  Other Medical Equipment (for information only, NOT a DME order):  {EQUIPMENT:492774217}  Other Treatments: ***    Prognosis: {Prognosis:5378443032}    Condition at Discharge: [address] [facility] Patient Condition:555630437}    Rehab Potential (if transferring to Rehab): {Prognosis:6157221005}    Recommended Labs or Other Treatments After Discharge: ***    Physician Certification: I certify the above information and transfer of Thuan Huffman  is necessary for the continuing treatment of the diagnosis listed and that he requires {Admit to Appropriate Level of Care:93068} for {GREATER/LESS:444539663} 30 days.      Update Admission H&P: {CHP DME Changes in SZPHY:018583954}    PHYSICIAN SIGNATURE:  {Esignature:[id number]}

## 2021-11-23 NOTE — ED PROVIDER NOTES
[facility] [address] EMERGENCY DEPARTMENT    Patient: Speedy Macdonald  MRN: [id number]  : 1955  Date of Evaluation: 2021  ED Provider: Jaelyn Contreras PA-C    Chief Complaint       Chief Complaint   Patient presents with    Chest Pain       VenetieBRYCE Macdonald is a 61 y.o. male who presents to the emergency department for chest pain. Onset was 0130am when his nurse at [facility] woke him to give him his Tylenol. Patient states he told her he was having chest pain and requested nitro but she told him she couldn't give him any, so he called the squad. Pain is left chest with radiation to the left shoulder. Sharp. Associated SOB. Alleviated with aspirin and nitro given by EMS en route to the hospital.  Denies fever, chills, diaphoresis. Denies cough or congestion. Denies abd pain, n/v/d. Chronic left leg pain, unchanged. History of CAD with stents/pacemaker, HTN. Former smoker. ROS     CONSTITUTIONAL:  Denies fever. EYES:  Denies visual changes. HEAD:  Denies headache. ENT:  Denies earache, nasal congestion, sore throat. NECK:  Denies neck pain. RESPIRATORY:  + shortness of breath. CARDIOVASCULAR:  + chest pain. GI:  Denies nausea or vomiting. :  Denies urinary symptoms. MUSCULOSKELETAL:  Denies extremity pain or swelling. BACK:  Denies back pain. INTEGUMENT:  Denies skin changes. LYMPHATIC:  Denies lymphadenopathy. NEUROLOGIC:  Denies any numbness/tingling.     Past History     Past Medical History:   Diagnosis Date    A-fib [facility])     on Coumadin    Abdominal pain 3/22/2012    Acute on chronic renal failure (HCC)     Allergic rhinitis     Bipolar affective disorder unspecified     possible schziophrenia per pt- Seeing Dr. Kavitha Mendoza of left shoulder 2007    s/p injection     CAD (coronary artery disease)     see Leonidas    Cardiomyopathy ([facility] [address]     NYHA FC II-III    CHF (NYHA class III, ACC/AHA stage C) ([facility] [address] 4/27/2012    Cholelithiasis 5/8/2012    per CT    Chronic kidney disease (CKD)     Chronic pain     on Ultram- dispensed [facility] per [facility]SP/psych program    Depression     Displacement of electrode lead of cardiac pacemaker 10/4/2019    10/4/2019 RV lead got pulled back in the connector    DJD (degenerative joint disease) of cervical spine     Elevated serum creatinine     Erectile dysfunction     Fractures     Left collar bone x8, left wrist, bilateral toes    GERD (gastroesophageal reflux disease)     GERD (gastroesophageal reflux disease)     H/O echocardiogram 03/26/15    EF 20% Mod dilated LV with severly reduced systolic function. pacer wire noted in right ventricle and right atrium.      Hiatal hernia 5/8/2012    per CT    History of nuclear stress test 05/01/2017    lexiscan-large anteroapical MI, no new changes, EF16%    Hypertension     Hypothyroid     Started by Dr. Shirley Lowery Ischemic heart disease     Lipidemia 4/27/2012    Lithium toxicity     4/2015    MI, old 4/27/2012    Paronychia 3/12/2012    S/P cardiac cath 2/21/14    Schizoaffective disorder [facility])     old chart also gives hx of paranoid schizo    Unintentional weight loss 3/22/2012    Possible due to bipolar/tabby/ paranoia (sept 2012) with refusing to eat food from the grocery store believing it's being poisoned      Past Surgical History:   Procedure Laterality Date    ANGIOPLASTY      per old chart pt had angioplasty (LAD) with cutting balloon and arthrectomy done 7/2010    CARDIAC CATHETERIZATION      per old chart pt had cardiac cath 12/2010, 2/2011,4/2012, and 2/2014    CARDIAC SURGERY      arteriogram; stent    CHOLECYSTECTOMY      ENDOSCOPY, COLON, DIAGNOSTIC  9/2015    GASTRIC FUNDOPLICATION  85/67/6002    Robotic laparoscopic assisted nissen, cholecystectomy    HIATAL HERNIA REPAIR  10/7/15    PACEMAKER PLACEMENT      per old chart pt had biV ICD inserted 1/2011 and then 5/2014 had left ventricular lead replacement done    SINUS SURGERY      with allergy testing done in the past- Dr. Dahiana Temple (([facility])   [address] History     Socioeconomic History    Marital status:      Spouse name: Not on file    Number of children: Not on file    Years of education: Not on file    Highest education level: Not on file   Occupational History    Not on file   Tobacco Use    Smoking status: Former Smoker     Packs/day: 0.50     Years: 46.00     Pack years: 23.00     Types: Cigarettes    Smokeless tobacco: Never Used   Vaping Use    Vaping Use: Never used   Substance and Sexual Activity    Alcohol use: No     Alcohol/week: 0.0 standard drinks    Drug use: No    Sexual activity: Not Currently   Other Topics Concern    Not on file   Social History Narrative    Not on file     Social Determinants of Health     Financial Resource Strain:     Difficulty of Paying Living Expenses: Not on file   Food Insecurity:     Worried About Running Out of Food in the Last Year: Not on file    Yimi of Food in the Last Year: Not on file   Transportation Needs:     Lack of Transportation (Medical): Not on file    Lack of Transportation (Non-Medical):  Not on file   Physical Activity:     Days of Exercise per Week: Not on file    Minutes of Exercise per Session: Not on file   Stress:     Feeling of Stress : Not on file   Social Connections:     Frequency of Communication with Friends and Family: Not on file    Frequency of Social Gatherings with Friends and Family: Not on file    Attends Anabaptism Services: Not on file    Active Member of Clubs or Organizations: Not on file    Attends Club or Organization Meetings: Not on file    Marital Status: Not on file   Intimate Partner Violence:     Fear of Current or Ex-Partner: Not on file    Emotionally Abused: Not on file    Physically Abused: Not on file    Sexually Abused: Not on file   Housing Stability:     Unable to Pay for Housing in the Last Year: Not on file    Number of Places Lived in the Last Year: Not on file    Unstable Housing in the Last Year: Not on file       Medications/Allergies     Previous Medications    ACETAMINOPHEN (TYLENOL) 325 MG TABLET    Take 650 mg by mouth every 4 hours as needed for Pain    ATORVASTATIN (LIPITOR) 80 MG TABLET    Take 1 tablet by mouth daily    BENZTROPINE (COGENTIN) 1 MG TABLET    Take 1 mg by mouth 2 times daily     DIGOXIN (LANOXIN) 125 MCG TABLET    Take 1 tablet by mouth daily 2 tab every 12 hours x 48 hours then one daily. DILTIAZEM (CARDIZEM) 30 MG TABLET    Take 1 tablet by mouth every 8 hours    EZETIMIBE (ZETIA) 10 MG TABLET    Take 10 mg by mouth daily    HYDROCODONE-ACETAMINOPHEN (NORCO) 5-325 MG PER TABLET    1 tablet 3 times daily as needed. LEVOTHYROXINE (SYNTHROID) 75 MCG TABLET    Take 75 mcg by mouth Daily. LORATADINE (CLARITIN) 10 MG TABLET    Take 1 tablet by mouth daily.     MEDROXYPROGESTERONE (PROVERA) 10 MG TABLET    1 tablet 2 times daily    METOPROLOL SUCCINATE (TOPROL XL) 25 MG EXTENDED RELEASE TABLET    Take 1 tablet by mouth daily    NITROGLYCERIN (NITROSTAT) 0.3 MG SL TABLET    Place 0.3 mg under the tongue every 5 minutes as needed for Chest pain    WARFARIN (COUMADIN) 2.5 MG TABLET    Take 2.5 mg by mouth Daily with supper    ZIPRASIDONE (GEODON) 40 MG CAPSULE    1 capsule 2 times daily     Allergies   Allergen Reactions    Penicillins Swelling    Azithromycin     Clozapine [Clozapine] Swelling    Haldol [Haloperidol Lactate]     Insulins      Per ecf    Ketorolac Tromethamine     Niacin And Related     Ultram [Tramadol]     Zomig [Zolmitriptan]     Codeine Nausea And Vomiting    Depakote [Divalproex Sodium] Other (See Comments)     ' makes eyeball fall out\"    Haldol [Haloperidol Lactate]      Hand and leg tremors with palpitation    Hydrocodone Nausea And Vomiting    Ibuprofen Anxiety     Causes pt to pass out    Imitrex [Sumatriptan] Anxiety    Maxalt [Rizatriptan] Nausea And Vomiting     Makes HA worse    Neurontin [Gabapentin] Other (See Comments)     \" makes eyeballs shrink\"        Physical Exam       ED Triage Vitals [11/23/21 0318]   BP Temp Temp src Pulse Resp SpO2 Height Weight   109/68 -- -- 80 16 98 % 6' 4\" (1.93 m) 196 lb (88.9 kg)     GENERAL APPEARANCE:  Well-developed, well-nourished, no acute distress. HEAD:  NC/AT. EYES:  Sclera anicteric. ENT:  Ears, nose, mouth normal.     NECK:  Supple. CARDIO:  RRR. LUNGS:   CTAB. Respirations unlabored. ABDOMEN:  Soft, non-distended, non-tender. BS active. EXTREMITIES:  No acute deformities. SKIN:  Warm and dry. NEUROLOGICAL:  Alert and oriented. PSYCHIATRIC:  Normal mood.      Diagnostics     Labs:  Results for orders placed or performed during the hospital encounter of 11/23/21   CBC Auto Differential   Result Value Ref Range    WBC 8.2 4.0 - 10.5 K/CU MM    RBC 4.05 (L) 4.6 - 6.2 M/CU MM    Hemoglobin 12.8 (L) 13.5 - 18.0 GM/DL    Hematocrit 39.1 (L) 42 - 52 %    MCV 96.5 78 - 100 FL    MCH 31.6 (H) 27 - 31 PG    MCHC 32.7 32.0 - 36.0 %    RDW 14.1 11.7 - 14.9 %    Platelets 196 819 - 968 K/CU MM    MPV 10.4 7.5 - 11.1 FL    Differential Type AUTOMATED DIFFERENTIAL     Segs Relative 52.9 36 - 66 %    Lymphocytes % 31.8 24 - 44 %    Monocytes % 9.7 (H) 0 - 4 %    Eosinophils % 4.8 (H) 0 - 3 %    Basophils % 0.6 0 - 1 %    Segs Absolute 4.3 K/CU MM    Lymphocytes Absolute 2.6 K/CU MM    Monocytes Absolute 0.8 K/CU MM    Eosinophils Absolute 0.4 K/CU MM    Basophils Absolute 0.1 K/CU MM    Nucleated RBC % 0.0 %    Total Nucleated RBC 0.0 K/CU MM    Total Immature Neutrophil 0.02 K/CU MM    Immature Neutrophil % 0.2 0 - 0.43 %   Comprehensive Metabolic Panel w/ Reflex to MG   Result Value Ref Range    Sodium 135 135 - 145 MMOL/L    Potassium 4.1 3.5 - 5.1 MMOL/L    Chloride 101 99 - 110 mMol/L    CO2 24 21 - 32 MMOL/L    BUN 17 6 - 23 MG/DL    CREATININE 1.2 0.9 - 1.3 MG/DL    Glucose 87 70 - 99 MG/DL    Calcium 9.1 8.3 - 10.6 MG/DL    Albumin 4.0 3.4 - 5.0 GM/DL    Total Protein 6.5 6.4 - 8.2 GM/DL    Total Bilirubin 0.3 0.0 - 1.0 MG/DL    ALT 9 (L) 10 - 40 U/L    AST 12 (L) 15 - 37 IU/L    Alkaline Phosphatase 111 40 - 128 IU/L    GFR Non-African American >60 >60 mL/min/1.73m2    GFR African American >60 >60 mL/min/1.73m2    Anion Gap 10 4 - 16       Radiographs:  XR CHEST PORTABLE    Result Date: 11/23/2021  EXAMINATION: ONE XRAY VIEW OF THE CHEST 11/23/2021 3:40 am COMPARISON: January 15, 2020 HISTORY: ORDERING SYSTEM PROVIDED HISTORY: cp TECHNOLOGIST PROVIDED HISTORY: Reason for exam:->cp Reason for Exam: CP Additional signs and symptoms: CHEST PAIN, SOB, FINDINGS: The left chest wall pacemaker and leads are stable. The cardiomediastinal silhouette is stable. There are increased lung markings bilaterally, may be related to mild pulmonary vascular congestion versus bronchitis. There is no pleural effusion. There is no pneumothorax. There is no acute osseous abnormality. Increased lung markings bilaterally, may be related to mild pulmonary vascular congestion versus bronchitis. Procedures/EKG:   Please see Dr. Vanessa Augustin note for interpretation. ED Course and MDM   -  Patient seen and evaluated in the emergency department. -  Triage and nursing notes reviewed and incorporated. -  Old chart records reviewed and incorporated. -  Supervising physician was Dr. Haily Atkins. Patient was seen independently. -  Work-up included:  See above  -  Patient's EKGs were sent to Dr. Zack Nguyen for review. No concerning changes per his review. -  Patient's CXR with likely mild pulmonary vascular congestion--doubt bronchitis as patient has no cough/infectious symptoms. HEART score is a 5. I discussed with Dr. Aby Lemons, hospitalist, who will admit for further evaluation.     In light of current events, I did utilize appropriate PPE (including N95 and surgical face mask, safety glasses, and gloves, as recommended by the health facility/national standard best practice, during my bedside interactions with the patient. Final Impression      1.  Chest pain, unspecified type            DISPOSITION        [facility], [address] [place], [place]  11/23/21 0319

## 2021-11-23 NOTE — ED NOTES
Pt moved to room 39 after stress test. Report and transfer of care given to RN.      MUNA's, RN  11/23/21 6910

## 2021-11-23 NOTE — DISCHARGE SUMMARY
Discharge Summary    Name:  Curt Arguelles /Age/Sex: 1955  (61 y.o. male)   MRN & CSN:  [id number] & [id number] Admission Date/Time: 2021  3:13 AM   Attending:  Marina Brooks DO Discharging Physician: Isabelle Livingston Course:   Curt Arguelles is a 61 y.o.  male with history of hypertension, hyperlipidemia, a-fib on coumadin and BB, CAD presents with chest pain. Cardiology consulted. Pt had troponin negative X2. Stress test was negative today. TTE showed LVEF 10%. Pt is on Digoxin 125 mcg daily. Pt reported resolved chest pain today. Cardiology signed off. Pt will be discharged to nursing home. Continue current medications. Follow up with cardiology and primary care physician in 3-7 days. Return to ER for any worsening symptoms or other concerns. Chest pain, rule out acute coronary syndrome, with h/o coronary artery disease  Tobacco abuse  -Troponin <0.010 x2  -Continue ASA, Beta blocker, and statin  -2D Echo: LVEF 10% (it was 15% two years ago)  -cardiology consulted: stress test no acute change. OK to discharge     AOCD  -Hemoglobin 12.8  -Baseline 13  -No evidence of hemorrhage, monitor and transfuse if hemoglobin less than 7     Other chronic medical conditions:   Continue all home meds except stated above or contraindicated. PAF  Bipolar affective disorder  Cardiomyopathy status post BiV  HFrEF: Not currently in exacerbation  GERD  Hyperlipidemia  Hypertension  Chronic pain  Seasonal allergies    The patient expressed appropriate understanding of and agreement with the discharge recommendations, medications, and plan.      Consults this admission:  IP CONSULT TO CARDIOLOGY  IP CONSULT TO HOSPITALIST    Discharge Instruction:   Follow up appointments: cardiology as scheduled  Primary care physician:  within 2 weeks    Diet:  cardiac diet   Activity: activity as tolerated  Disposition: Discharged to:   []Home, []HHC, [x]SNF, []Acute Rehab, []Hospice   Condition on discharge: Stable    Discharge Medications:        Medication List      CHANGE how you take these medications    dilTIAZem 30 MG tablet  Commonly known as: CARDIZEM  Take 1 tablet by mouth every 8 hours  What changed:   how much to take  additional instructions        CONTINUE taking these medications    acetaminophen 325 MG tablet  Commonly known as: TYLENOL     atorvastatin 80 MG tablet  Commonly known as: LIPITOR  Take 1 tablet by mouth daily     benztropine 1 MG tablet  Commonly known as: COGENTIN     digoxin 125 MCG tablet  Commonly known as: LANOXIN  Take 1 tablet by mouth daily 2 tab every 12 hours x 48 hours then one daily. ezetimibe 10 MG tablet  Commonly known as: ZETIA     HYDROcodone-acetaminophen 5-325 MG per tablet  Commonly known as: NORCO     levothyroxine 75 MCG tablet  Commonly known as: SYNTHROID     loratadine 10 MG tablet  Commonly known as: Claritin  Take 1 tablet by mouth daily. metoprolol succinate 25 MG extended release tablet  Commonly known as: Toprol XL  Take 1 tablet by mouth daily     nitroGLYCERIN 0.3 MG SL tablet  Commonly known as: NITROSTAT     traMADol 50 MG tablet  Commonly known as: ULTRAM     warfarin 2.5 MG tablet  Commonly known as: COUMADIN     ziprasidone 40 MG capsule  Commonly known as: GEODON        STOP taking these medications    medroxyPROGESTERone 10 MG tablet  Commonly known as: PROVERA            Objective Findings at Discharge:   /68   Pulse 67   Temp 98.9 °F (37.2 °C) (Oral)   Resp 18   Ht 6' 4\" (1.93 m)   Wt 196 lb (88.9 kg)   SpO2 97%   BMI 23.86 kg/m²            PHYSICAL EXAM 11/23  GEN Awake male, sitting upright in bed in no apparent distress. Appears given age. EYES Pupils are equally round. No scleral erythema, discharge, or conjunctivitis. HENT Mucous membranes are moist. Oral pharynx without exudates, no evidence of thrush. NECK Supple, no apparent thyromegaly or masses. RESP Clear to auscultation, no wheezes, rales or rhonchi.   Symmetric

## 2021-12-08 ENCOUNTER — PROCEDURE VISIT (OUTPATIENT)
Dept: CARDIOLOGY CLINIC | Age: 66
End: 2021-12-08
Payer: COMMERCIAL

## 2021-12-08 DIAGNOSIS — I49.5 SINUS NODE DYSFUNCTION (HCC): ICD-10-CM

## 2021-12-08 DIAGNOSIS — Z95.810 BIVENTRICULAR ICD (IMPLANTABLE CARDIOVERTER-DEFIBRILLATOR) IN PLACE: Primary | ICD-10-CM

## 2021-12-08 PROCEDURE — 93297 REM INTERROG DEV EVAL ICPMS: CPT | Performed by: INTERNAL MEDICINE

## 2021-12-08 PROCEDURE — 93296 REM INTERROG EVL PM/IDS: CPT | Performed by: INTERNAL MEDICINE

## 2021-12-08 PROCEDURE — 93295 DEV INTERROG REMOTE 1/2/MLT: CPT | Performed by: INTERNAL MEDICINE

## 2021-12-16 ENCOUNTER — OFFICE VISIT (OUTPATIENT)
Dept: CARDIOLOGY CLINIC | Age: 66
End: 2021-12-16
Payer: COMMERCIAL

## 2021-12-16 ENCOUNTER — TELEPHONE (OUTPATIENT)
Dept: CARDIOLOGY CLINIC | Age: 66
End: 2021-12-16

## 2021-12-16 VITALS
HEIGHT: 76 IN | DIASTOLIC BLOOD PRESSURE: 72 MMHG | BODY MASS INDEX: 24.18 KG/M2 | SYSTOLIC BLOOD PRESSURE: 126 MMHG | HEART RATE: 77 BPM | WEIGHT: 198.6 LBS

## 2021-12-16 DIAGNOSIS — Z98.61 S/P PTCA (PERCUTANEOUS TRANSLUMINAL CORONARY ANGIOPLASTY): Chronic | ICD-10-CM

## 2021-12-16 DIAGNOSIS — I50.9 CHF (NYHA CLASS III, ACC/AHA STAGE C) (HCC): ICD-10-CM

## 2021-12-16 DIAGNOSIS — Z95.810 BIVENTRICULAR ICD (IMPLANTABLE CARDIOVERTER-DEFIBRILLATOR) IN PLACE: ICD-10-CM

## 2021-12-16 DIAGNOSIS — Z79.899 ON AMIODARONE THERAPY: ICD-10-CM

## 2021-12-16 DIAGNOSIS — E78.00 PURE HYPERCHOLESTEROLEMIA: ICD-10-CM

## 2021-12-16 DIAGNOSIS — I48.0 PAF (PAROXYSMAL ATRIAL FIBRILLATION) (HCC): Primary | Chronic | ICD-10-CM

## 2021-12-16 PROCEDURE — 4004F PT TOBACCO SCREEN RCVD TLK: CPT | Performed by: INTERNAL MEDICINE

## 2021-12-16 PROCEDURE — G8484 FLU IMMUNIZE NO ADMIN: HCPCS | Performed by: INTERNAL MEDICINE

## 2021-12-16 PROCEDURE — 99214 OFFICE O/P EST MOD 30 MIN: CPT | Performed by: INTERNAL MEDICINE

## 2021-12-16 PROCEDURE — G8427 DOCREV CUR MEDS BY ELIG CLIN: HCPCS | Performed by: INTERNAL MEDICINE

## 2021-12-16 PROCEDURE — G8420 CALC BMI NORM PARAMETERS: HCPCS | Performed by: INTERNAL MEDICINE

## 2021-12-16 PROCEDURE — 3017F COLORECTAL CA SCREEN DOC REV: CPT | Performed by: INTERNAL MEDICINE

## 2021-12-16 RX ORDER — AMIODARONE HYDROCHLORIDE 200 MG/1
200 TABLET ORAL DAILY
Qty: 90 TABLET | Refills: 3 | Status: SHIPPED | OUTPATIENT
Start: 2021-12-16

## 2021-12-16 NOTE — ASSESSMENT & PLAN NOTE
He declined atrial fibrillation ablation. We will start him on amiodarone 200 mg a day. Obtain TSH now and again in 3 months. LFTs were normal on recent admission and chest x-ray showed CHF. Clinically he is not in CHF. We will discontinue Cardizem after he is on amiodarone for couple of weeks and continue other medications and check digoxin level. PT/INR needs close follow-up for the next few weeks as amiodarone will increase the potency of warfarin. This has been communicated to him and his aide who came with him and they will communicate with the nursing staff at the nursing facility.

## 2021-12-16 NOTE — PROGRESS NOTES
Brett Chavez (:  1955) is a 61 y.o. male,     Chief Complaint   Patient presents with    Other     Patient is here for a 3 months follow up. Patient states he smokes 8 cigg a day, no alochol and patient states he drinks 2 can of pop a day. Patient as of right no whas no cardiac issues. Patient is here for follow up for paroxysmal atrial fibrillation and palpitations and has known coronary artery disease with severe ischemic cardiomyopathy and history of congestive heart failure status post biventricular ICD implantation. He was referred to EP after last visit with me. He saw Dr. Camilo Langley and by then he had converted to normal sinus rhythm. Atrial fibrillation ablation was offered but she declined. He was in the hospital again last month and had noninvasive cardiac evaluation and we have reviewed the records and discussed the results in detail with him. He states he feels fine on current medications and does not want to have more procedures done. He is in [facility] long-term nursing facility. He seems to be compliant to medications. PT/INR is followed by PCP. Allergies   Allergen Reactions    Penicillins Swelling    Azithromycin     Clozapine [Clozapine] Swelling    Haldol [Haloperidol Lactate]     Insulins      Per ecf    Niacin And Related     Ultram [Tramadol]     Zomig [Zolmitriptan]     Codeine Nausea And Vomiting    Depakote [Divalproex Sodium] Other (See Comments)     ' makes eyeball fall out\"    Haldol [Haloperidol Lactate]      Hand and leg tremors with palpitation    Hydrocodone Nausea And Vomiting    Ibuprofen Anxiety     Causes pt to pass out    Imitrex [Sumatriptan] Anxiety    Maxalt [Rizatriptan] Nausea And Vomiting     Makes HA worse    Neurontin [Gabapentin] Other (See Comments)     \" makes eyeballs shrink\"     Prior to Admission medications    Medication Sig Start Date End Date Taking?  Authorizing Provider   amiodarone (CORDARONE) 200 MG tablet Take 1 tablet by mouth daily 12/16/21  Yes Rich Damico MD   traMADol (ULTRAM) 50 MG tablet Take 1 tablet by mouth every 6 hours as needed for Pain Moderate (4-6). 7/1/21  Yes Historical Provider, MD   digoxin (LANOXIN) 125 MCG tablet Take 1 tablet by mouth daily 2 tab every 12 hours x 48 hours then one daily. 9/15/21  Yes Rich Damico MD   metoprolol succinate (TOPROL XL) 25 MG extended release tablet Take 1 tablet by mouth daily 9/15/21  Yes Vish Corley MD   HYDROcodone-acetaminophen (NORCO) 5-325 MG per tablet 1 tablet 3 times daily as needed. 3/5/20  Yes Historical Provider, MD   ziprasidone (GEODON) 40 MG capsule 1 capsule 2 times daily 2/19/20  Yes Historical Provider, MD   diltiazem (CARDIZEM) 30 MG tablet Take 1 tablet by mouth every 8 hours  Patient taking differently: Take 45 mg by mouth every 8 hours Pt takes 1.5 tablets daily 10/4/19  Yes Barry Bradley MD   warfarin (COUMADIN) 2.5 MG tablet Take 2 mg by mouth Daily with supper    Yes Historical Provider, MD   acetaminophen (TYLENOL) 325 MG tablet Take 650 mg by mouth every 4 hours as needed for Pain   Yes Historical Provider, MD   ezetimibe (ZETIA) 10 MG tablet Take 10 mg by mouth daily   Yes Historical Provider, MD   atorvastatin (LIPITOR) 80 MG tablet Take 1 tablet by mouth daily 4/18/17  Yes Polo Mahmood MD   nitroGLYCERIN (NITROSTAT) 0.3 MG SL tablet Place 0.3 mg under the tongue every 5 minutes as needed for Chest pain   Yes Historical Provider, MD   levothyroxine (SYNTHROID) 75 MCG tablet Take 75 mcg by mouth Daily. Yes Historical Provider, MD   benztropine (COGENTIN) 1 MG tablet Take 1 mg by mouth 2 times daily    Yes Historical Provider, MD   loratadine (CLARITIN) 10 MG tablet Take 1 tablet by mouth daily.  4/23/13  Yes Seema Bridges MD     Past Medical History:   Diagnosis Date    A-fib [facility])     on Coumadin    Abdominal pain 3/22/2012    Acute on chronic renal failure (HCC)     Allergic rhinitis     Bipolar affective disorder unspecified     possible schziophrenia per pt- Seeing Dr. Iwona Jorge of left shoulder 2007    s/p injection     CAD (coronary artery disease)     see Leonidas    Cardiomyopathy ([facility] [address]     NYHA FC II-III    CHF (NYHA class III, ACC/AHA stage C) ([facility] [address] 4/27/2012    Cholelithiasis 5/8/2012    per CT    Chronic kidney disease (CKD)     Chronic pain     on Ultram- dispensed at [facility] per CSP/psych program    Depression     Displacement of electrode lead of cardiac pacemaker 10/4/2019    10/4/2019 RV lead got pulled back in the connector    DJD (degenerative joint disease) of cervical spine     Elevated serum creatinine     Erectile dysfunction     Fractures     Left collar bone x8, left wrist, bilateral toes    GERD (gastroesophageal reflux disease)     GERD (gastroesophageal reflux disease)     H/O echocardiogram 03/26/15    EF 20% Mod dilated LV with severly reduced systolic function. pacer wire noted in right ventricle and right atrium.  Hiatal hernia 5/8/2012    per CT    History of nuclear stress test 05/01/2017    lexiscan-large anteroapical MI, no new changes, EF16%    Hypertension     Hypothyroid     Started by Dr. Yany Baker Ischemic heart disease     Lipidemia 4/27/2012    Lithium toxicity     4/2015    MI, old 4/27/2012    Paronychia 3/12/2012    S/P cardiac cath 2/21/14    Schizoaffective disorder [facility])     old chart also gives hx of paranoid schizo    Unintentional weight loss 3/22/2012    Possible due to bipolar/tabby/ paranoia (sept 2012) with refusing to eat food from the grocery store believing it's being poisoned       Vitals:    12/16/21 1037   BP: 126/72   Pulse: 77   Weight: 198 lb 9.6 oz (90.1 kg)   Height: 6' 4\" (1.93 m)      Body mass index is 24.17 kg/m².   Wt Readings from Last 3 Encounters:   12/16/21 198 lb 9.6 oz (90.1 kg)   11/23/21 196 lb (88.9 kg)   09/15/21 196 lb 12.8 oz (89.3 kg)     Constitutional:  Patient is normally built and nourished male in no acute distress. He walks with a walker. HEENT: Is wearing glasses and facemask. Cardiovascular: Auscultation: Irregular rapid  S1 and S2. No significant murmurs appreciated. Left-sided ICD pocket is intact. Respiratory:  Respiratory effort is normal. Breath sounds are clear to auscultation. Extremities:  No edema, clubbing,  Cyanosis, petechiae. Neurologic:  Oriented to time, place, and person and non-anxious. No focal neurological deficit noted. Psychiatric: Normal mood and effect. [facility] records are reviewed. Echocardiogram on 11/23/2021 reported  Left ventricular systolic function is abnormal.   Ejection fraction is visually estimated at 10%. Severely dilated left ventricle. Grade III diastolic dysfunction. PPM/ICD wiring visualized in the right heart. Mild to moderate mitral regurgitation is present. No evidence of any pericardial effusion. Nuclear stress test on 11/23/2021 reported  Large sized defect of moderate severity which is persistent involving apical  wall of myocardium. Abnormal Lexiscan nuclear scintigraphic study suggestive  of abnormal myocardial perfusion. Gated images demonstrate abnormal left  ventricular systolic function with EF of 18 %. Nuclear scintigraphy  demonstartes anterior wall infarct. No new changes from prev Study     ICD analysis from 12/5/2021 is reviewed and is consistent with normal Medtronic MRI safe CRT ICD function with stable leads and appropriate battery status for the age of the device. No therapies detected. Programming parameters are also reviewed for optimal settings for this device in this patient. Device is programmed to DDD mode lower rate of 60 bpm and pacing in both ventricles 48% of the time. OptiVol fluid index suggest well compensated CHF status. PAF burden is 0.8% since November 14, 2021 and the longest duration was 111 minutes. Patient is on warfarin for anticoagulation therapy.   Remaining device longevity is 6.4 years. Chest Xray 11/23/2021 reported  The left chest wall pacemaker and leads are stable.  The cardiomediastinal   silhouette is stable.  There are increased lung markings bilaterally, may be   related to mild pulmonary vascular congestion versus bronchitis. Rheta Dyan is no   pleural effusion. Rheta Dyan is no pneumothorax. Rheta Dyan is no acute osseous   abnormality.           Impression   Increased lung markings bilaterally, may be related to mild pulmonary   vascular congestion versus bronchitis. Pertinent records reviewed and discussed with patient and results are as follow:    Lab Results   Component Value Date    WBC 8.2 11/23/2021    HGB 12.8 11/23/2021    HCT 39.1 11/23/2021     11/23/2021     Lab Results   Component Value Date    CHOL 197 11/23/2021    TRIG 107 11/23/2021    HDL 34 (L) 11/23/2021    LDLCALC 99 02/20/2020    LDLDIRECT 145 (H) 11/23/2021     Lab Results   Component Value Date    BUN 17 11/23/2021    CREATININE 1.2 11/23/2021     11/23/2021    K 4.1 11/23/2021     Calcium 8.3 - 10.6 MG/DL 9.1    Albumin 3.4 - 5.0 GM/DL 4.0    Total Protein 6.4 - 8.2 GM/DL 6.5    Total Bilirubin 0.0 - 1.0 MG/DL 0.3    ALT 10 - 40 U/L 9 Low     AST 15 - 37 IU/L 12 Low     Alkaline Phosphatase 40 - 128 IU/L        Lab Results   Component Value Date    INR 2.68 11/23/2021     ASSESSMENT/PLAN:    1. PAF (paroxysmal atrial fibrillation) ([facility] [address]  Assessment & Plan:  He declined atrial fibrillation ablation. We will start him on amiodarone 200 mg a day. Obtain TSH now and again in 3 months. LFTs were normal on recent admission and chest x-ray showed CHF. Clinically he is not in CHF. We will discontinue Cardizem after he is on amiodarone for couple of weeks and continue other medications and check digoxin level. PT/INR needs close follow-up for the next few weeks as amiodarone will increase the potency of warfarin.   This has been communicated to him and his aide who came with him and they will communicate with the nursing staff at the nursing facility. 2. Pure hypercholesterolemia  Assessment & Plan:  Not optimally controlled on current medications. LDL was 145 on maximum dose of Lipitor and Zetia. 3. CAD- S/P LAD stents 2013 at [facility]:  Clinically stable. Continue aggressive risk factor modification for secondary prevention. 4. CHF (NYHA class III, ACC/AHA stage C) ([facility]  Assessment & Plan:  Clinically not in congestive heart failure today and seem to be well compensated. Orders:  -     XR CHEST (2 VW); Future  5. Biventricular ICD replaced 2019  Assessment & Plan:  Working well and was checked on December 5 and we discussed the results. We will continue to monitor every 3 months. 6. On amiodarone therapy  -     TSH without Reflex; Future  -     Hepatic Function Panel; Future  -     TSH without Reflex; Future  -     Digoxin Level; Future      Start Amiodarone 200 mg daily and discontinue Cardizem after 2 weeks. Monitor PT/INR closely/ weekly for the next 1- months as it will go up now adding warfarin check Digoxin level in one months and repeat Amio labs in 3 months. Baseline TSH today. Continue other current medications. Follow-up in 3 months with EKG, ICD check,  TSH, LFT and Chest Xray and PFT. An electronic signature was used to authenticate this note.     --Armando Dacosta MD

## 2021-12-16 NOTE — TELEPHONE ENCOUNTER
Sera with [facility] called for clarification on the orders that was sent home with patient today

## 2021-12-16 NOTE — ASSESSMENT & PLAN NOTE
Working well and was checked on December 5 and we discussed the results. We will continue to monitor every 3 months.

## 2021-12-16 NOTE — PATIENT INSTRUCTIONS
Start Amiodarone 200 mg daily and discontinue Cardizem after 2 weeks. Monitor PT/INR closely/ weekly for the next 1- months as it will go up now adding warfarin check Digoxin level in one months and repeat Amio labs in 3 months. Baseline TSH today. Continue other current medications. Follow-up in 3 months with ICD check,  TSH, LFT and Chest Xray and PFT.

## 2022-02-14 ENCOUNTER — APPOINTMENT (OUTPATIENT)
Dept: GENERAL RADIOLOGY | Age: 67
End: 2022-02-14
Payer: MEDICARE

## 2022-02-14 ENCOUNTER — HOSPITAL ENCOUNTER (EMERGENCY)
Age: 67
Discharge: HOME | End: 2022-02-14
Payer: MEDICARE

## 2022-02-14 VITALS
SYSTOLIC BLOOD PRESSURE: 120 MMHG | HEART RATE: 60 BPM | RESPIRATION RATE: 18 BRPM | OXYGEN SATURATION: 97 % | TEMPERATURE: 98.2 F | DIASTOLIC BLOOD PRESSURE: 67 MMHG

## 2022-02-14 DIAGNOSIS — R07.9 CHEST PAIN, UNSPECIFIED TYPE: Primary | ICD-10-CM

## 2022-02-14 LAB
ALBUMIN SERPL-MCNC: 3.9 GM/DL (ref 3.4–5)
ALP BLD-CCNC: 104 IU/L (ref 40–128)
ALT SERPL-CCNC: 9 U/L (ref 10–40)
ANION GAP SERPL CALCULATED.3IONS-SCNC: 8 MMOL/L (ref 4–16)
AST SERPL-CCNC: 11 IU/L (ref 15–37)
BASOPHILS ABSOLUTE: 0.1 K/CU MM
BASOPHILS RELATIVE PERCENT: 0.8 % (ref 0–1)
BILIRUB SERPL-MCNC: 0.3 MG/DL (ref 0–1)
BUN BLDV-MCNC: 20 MG/DL (ref 6–23)
CALCIUM SERPL-MCNC: 9.4 MG/DL (ref 8.3–10.6)
CHLORIDE BLD-SCNC: 105 MMOL/L (ref 99–110)
CO2: 27 MMOL/L (ref 21–32)
CREAT SERPL-MCNC: 1.3 MG/DL (ref 0.9–1.3)
DIFFERENTIAL TYPE: ABNORMAL
EKG ATRIAL RATE: 60 BPM
EKG DIAGNOSIS: NORMAL
EKG P AXIS: 74 DEGREES
EKG P-R INTERVAL: 286 MS
EKG Q-T INTERVAL: 456 MS
EKG QRS DURATION: 176 MS
EKG QTC CALCULATION (BAZETT): 456 MS
EKG R AXIS: -44 DEGREES
EKG T AXIS: 128 DEGREES
EKG VENTRICULAR RATE: 60 BPM
EOSINOPHILS ABSOLUTE: 0.4 K/CU MM
EOSINOPHILS RELATIVE PERCENT: 6.2 % (ref 0–3)
GFR AFRICAN AMERICAN: >60 ML/MIN/1.73M2
GFR NON-AFRICAN AMERICAN: 56 ML/MIN/1.73M2
GLUCOSE BLD-MCNC: 97 MG/DL (ref 70–99)
HCT VFR BLD CALC: 39.6 % (ref 42–52)
HEMOGLOBIN: 13 GM/DL (ref 13.5–18)
IMMATURE NEUTROPHIL %: 0.3 % (ref 0–0.43)
LIPASE: 20 IU/L (ref 13–60)
LYMPHOCYTES ABSOLUTE: 2 K/CU MM
LYMPHOCYTES RELATIVE PERCENT: 27.7 % (ref 24–44)
MCH RBC QN AUTO: 31.3 PG (ref 27–31)
MCHC RBC AUTO-ENTMCNC: 32.8 % (ref 32–36)
MCV RBC AUTO: 95.4 FL (ref 78–100)
MONOCYTES ABSOLUTE: 0.7 K/CU MM
MONOCYTES RELATIVE PERCENT: 9.3 % (ref 0–4)
NUCLEATED RBC %: 0 %
PDW BLD-RTO: 13.5 % (ref 11.7–14.9)
PLATELET # BLD: 193 K/CU MM (ref 140–440)
PMV BLD AUTO: 10.8 FL (ref 7.5–11.1)
POTASSIUM SERPL-SCNC: 4.3 MMOL/L (ref 3.5–5.1)
PRO-BNP: 993.5 PG/ML
RBC # BLD: 4.15 M/CU MM (ref 4.6–6.2)
SEGMENTED NEUTROPHILS ABSOLUTE COUNT: 4 K/CU MM
SEGMENTED NEUTROPHILS RELATIVE PERCENT: 55.7 % (ref 36–66)
SODIUM BLD-SCNC: 140 MMOL/L (ref 135–145)
TOTAL IMMATURE NEUTOROPHIL: 0.02 K/CU MM
TOTAL NUCLEATED RBC: 0 K/CU MM
TOTAL PROTEIN: 6.6 GM/DL (ref 6.4–8.2)
TROPONIN T: <0.01 NG/ML
TROPONIN T: <0.01 NG/ML
WBC # BLD: 7.1 K/CU MM (ref 4–10.5)

## 2022-02-14 PROCEDURE — 93010 ELECTROCARDIOGRAM REPORT: CPT | Performed by: INTERNAL MEDICINE

## 2022-02-14 PROCEDURE — 83690 ASSAY OF LIPASE: CPT

## 2022-02-14 PROCEDURE — 6370000000 HC RX 637 (ALT 250 FOR IP): Performed by: PHYSICIAN ASSISTANT

## 2022-02-14 PROCEDURE — 85025 COMPLETE CBC W/AUTO DIFF WBC: CPT

## 2022-02-14 PROCEDURE — 93005 ELECTROCARDIOGRAM TRACING: CPT | Performed by: EMERGENCY MEDICINE

## 2022-02-14 PROCEDURE — 99283 EMERGENCY DEPT VISIT LOW MDM: CPT

## 2022-02-14 PROCEDURE — 80053 COMPREHEN METABOLIC PANEL: CPT

## 2022-02-14 PROCEDURE — 71045 X-RAY EXAM CHEST 1 VIEW: CPT

## 2022-02-14 PROCEDURE — 84484 ASSAY OF TROPONIN QUANT: CPT

## 2022-02-14 PROCEDURE — 83880 ASSAY OF NATRIURETIC PEPTIDE: CPT

## 2022-02-14 RX ORDER — ASPIRIN 81 MG/1
324 TABLET, CHEWABLE ORAL ONCE
Status: COMPLETED | OUTPATIENT
Start: 2022-02-14 | End: 2022-02-14

## 2022-02-14 RX ADMIN — ASPIRIN 81 MG 324 MG: 81 TABLET ORAL at 01:58

## 2022-02-14 NOTE — ED PROVIDER NOTES
Triage Chief Complaint:   Chest Pain    [place]:  Today in the ED I had the pleasure of caring for Eyad Officer who is a 61 y.o. male that presents today to the emergency department complaining of chest pain. Chest pain started around 9 PM today with patient was watching the Super Bowl. Squeezing in nature. Tight in nature. In the left arm. Does have a history of coronary artery disease. As well as MI. He does not know whether or not he has any stents in his heart. Just had a stress test that was negative about 2 months ago. He denies any associated nausea vomiting fever chills diarrhea back pain abdominal pain. No change in vision dizziness or confusion.   Did take a single nitro which helped his pain and now is completely resolved  ROS:  REVIEW OF SYSTEMS    At least 10 systems reviewed      All other review of systems are negative  See HPI and nursing notes for additional information       Past Medical History:   Diagnosis Date    A-fib [facility])     on Coumadin    Abdominal pain 3/22/2012    Acute on chronic renal failure (HCC)     Allergic rhinitis     Bipolar affective disorder unspecified     possible schziophrenia per pt- Seeing Dr. Oxana Crowder of left shoulder 2007    s/p injection     CAD (coronary artery disease)     see Leonidas    Cardiomyopathy ([facility] [address]     NYHA FC II-III    CHF (NYHA class III, ACC/AHA stage C) ([address] 4/27/2012    Cholelithiasis 5/8/2012    per CT    Chronic kidney disease (CKD)     Chronic pain     on Ultram- dispensed at [facility] per CSP/psych program    Depression     Displacement of electrode lead of cardiac pacemaker 10/4/2019    10/4/2019 RV lead got pulled back in the connector    DJD (degenerative joint disease) of cervical spine     Elevated serum creatinine     Erectile dysfunction     Fractures     Left collar bone x8, left wrist, bilateral toes    GERD (gastroesophageal reflux disease)     GERD (gastroesophageal reflux disease)     H/O echocardiogram 03/26/15    EF 20% Mod dilated LV with severly reduced systolic function. pacer wire noted in right ventricle and right atrium.      Hiatal hernia 5/8/2012    per CT    History of nuclear stress test 05/01/2017    lexiscan-large anteroapical MI, no new changes, EF16%    Hypertension     Hypothyroid     Started by Dr. Luís Gonzalez Ischemic heart disease     Lipidemia 4/27/2012    Lithium toxicity     4/2015    MI, old 4/27/2012    Paronychia 3/12/2012    S/P cardiac cath 2/21/14    Schizoaffective disorder [facility])     old chart also gives hx of paranoid schizo    Unintentional weight loss 3/22/2012    Possible due to bipolar/tabby/ paranoia (sept 2012) with refusing to eat food from the grocery store believing it's being poisoned      Past Surgical History:   Procedure Laterality Date    ANGIOPLASTY      per old chart pt had angioplasty (LAD) with cutting balloon and arthrectomy done 7/2010    CARDIAC CATHETERIZATION      per old chart pt had cardiac cath 12/2010, 2/2011,4/2012, and 2/2014    CARDIAC SURGERY      arteriogram; stent    CHOLECYSTECTOMY      ENDOSCOPY, COLON, DIAGNOSTIC  9/2015    GASTRIC FUNDOPLICATION  22/33/9700    Robotic laparoscopic assisted nissen, cholecystectomy    HIATAL HERNIA REPAIR  10/7/15    PACEMAKER PLACEMENT      per old chart pt had biV ICD inserted 1/2011 and then 5/2014 had left ventricular lead replacement done    SINUS SURGERY      with allergy testing done in the past- Dr. Abdiriazk Burt (([address] [place] [address])    TONSILLECTOMY       Family History   Problem Relation Age of Onset    Mental Illness Mother         substance abuse    Mental Illness Sister     Mental Illness Sister      Social History     Socioeconomic History    Marital status:      Spouse name: Not on file    Number of children: Not on file    Years of education: Not on file    Highest education level: Not on file   Occupational History    Not on file   Tobacco Use    Smoking status: Current Every Day Smoker     Packs/day: 0.50     Years: 46.00     Pack years: 23.00     Types: Cigarettes    Smokeless tobacco: Never Used   Vaping Use    Vaping Use: Never used   Substance and Sexual Activity    Alcohol use: No     Alcohol/week: 0.0 standard drinks    Drug use: No    Sexual activity: Not Currently   Other Topics Concern    Not on file   Social History Narrative    Not on file     Social Determinants of Health     Financial Resource Strain:     Difficulty of Paying Living Expenses: Not on file   Food Insecurity:     Worried About Running Out of Food in the Last Year: Not on file    Yimi of Food in the Last Year: Not on file   Transportation Needs:     Lack of Transportation (Medical): Not on file    Lack of Transportation (Non-Medical): Not on file   Physical Activity:     Days of Exercise per Week: Not on file    Minutes of Exercise per Session: Not on file   Stress:     Feeling of Stress : Not on file   Social Connections:     Frequency of Communication with Friends and Family: Not on file    Frequency of Social Gatherings with Friends and Family: Not on file    Attends Hinduism Services: Not on file    Active Member of [address] [place] [address] or Organizations: Not on file    Attends Club or Organization Meetings: Not on file    Marital Status: Not on file   Intimate Partner Violence:     Fear of Current or Ex-Partner: Not on file    Emotionally Abused: Not on file    Physically Abused: Not on file    Sexually Abused: Not on file   Housing Stability:     Unable to Pay for Housing in the Last Year: Not on file    Number of [place] in the Last Year: Not on file    Unstable Housing in the Last Year: Not on file     No current facility-administered medications for this encounter.      Current Outpatient Medications   Medication Sig Dispense Refill    amiodarone (CORDARONE) 200 MG tablet Take 1 tablet by mouth daily 90 tablet 3    traMADol (ULTRAM) 50 MG tablet Take 1 tablet by mouth every 6 hours as needed for Pain Moderate (4-6).  digoxin (LANOXIN) 125 MCG tablet Take 1 tablet by mouth daily 2 tab every 12 hours x 48 hours then one daily. 30 tablet 3    metoprolol succinate (TOPROL XL) 25 MG extended release tablet Take 1 tablet by mouth daily 30 tablet 3    HYDROcodone-acetaminophen (NORCO) 5-325 MG per tablet 1 tablet 3 times daily as needed.  ziprasidone (GEODON) 40 MG capsule 1 capsule 2 times daily      diltiazem (CARDIZEM) 30 MG tablet Take 1 tablet by mouth every 8 hours (Patient taking differently: Take 45 mg by mouth every 8 hours Pt takes 1.5 tablets daily) 120 tablet 3    warfarin (COUMADIN) 2.5 MG tablet Take 2 mg by mouth Daily with supper       acetaminophen (TYLENOL) 325 MG tablet Take 650 mg by mouth every 4 hours as needed for Pain      ezetimibe (ZETIA) 10 MG tablet Take 10 mg by mouth daily      atorvastatin (LIPITOR) 80 MG tablet Take 1 tablet by mouth daily 90 tablet 3    nitroGLYCERIN (NITROSTAT) 0.3 MG SL tablet Place 0.3 mg under the tongue every 5 minutes as needed for Chest pain      levothyroxine (SYNTHROID) 75 MCG tablet Take 75 mcg by mouth Daily.  benztropine (COGENTIN) 1 MG tablet Take 1 mg by mouth 2 times daily       loratadine (CLARITIN) 10 MG tablet Take 1 tablet by mouth daily.  90 tablet 3     Allergies   Allergen Reactions    Penicillins Swelling    Azithromycin     Clozapine [Clozapine] Swelling    Haldol [Haloperidol Lactate]     Insulins      Per ecf    Niacin And Related     Ultram [Tramadol]     Zomig [Zolmitriptan]     Codeine Nausea And Vomiting    Depakote [Divalproex Sodium] Other (See Comments)     ' makes eyeball fall out\"    Haldol [Haloperidol Lactate]      Hand and leg tremors with palpitation    Hydrocodone Nausea And Vomiting    Ibuprofen Anxiety     Causes pt to pass out    Imitrex [Sumatriptan] Anxiety    Maxalt [Rizatriptan] Nausea And Vomiting     Makes HA worse    Neurontin [Gabapentin] Other (See Comments)     \" makes eyeballs shrink\"       Nursing Notes Reviewed    Physical Exam:  ED Triage Vitals   Enc Vitals Group      BP       Pulse       Resp       Temp       Temp src       SpO2       Weight       Height       Head Circumference       Peak Flow       Pain Score       Pain Loc       Pain Edu? Excl. in [address]? General :Patient is awake alert oriented person place and time no acute distress nontoxic appearing  HEENT: Pupils are equally round and reactive to light extraocular motors are intact conjunctivae clear sclerae white there is no injection no icterus. Nose without any rhinorrhea or epistaxis. Oral mucosa is moist no exudate buccal mucosa shows no ulcerations. Uvula is midline    Neck: Neck is supple full range of motion trachea midline thyroid nonpalpable  Cardiac: Heart regular rate rhythm no murmurs rubs clicks or gallops  Lungs: Lungs are clear to auscultation there is no wheezing rhonchi or rales. There is no use of accessory muscles no nasal flaring identified. Chest wall: There is no tenderness to palpation over the chest wall or over ribs  Abdomen: Abdomen is soft nontender nondistended. There is no firm or pulsatile masses no rebound rigidity or guarding negative Velásquez's negative McBurney, no peritoneal signs  Suprapubic:  there is no tenderness to palpation over the external bladder   Musculoskeletal: 5 out of 5 strength in all 4 extremities full flexion extension abduction and adduction supination pronation of all extremities and all digits. No obvious muscle atrophy is noted. No focal muscle deficits are appreciated  Dermatology: Skin is warm and dry there is no obvious abscesses lacerations or lesions noted  Psych: Mentation is grossly normal cognition is grossly normal. Affect is appropriate  Neuro:  Motor intact sensory intact cranial nerves II through XII are intact level of consciousness is normal cerebellar function is normal reflexes are grossly normal. No evidence of incontinence or loss of bowel or bladder no saddle anesthesia noted Lymphatic: There is no submandibular or cervical adenopathy appreciated.         I have reviewed and interpreted all of the currently available lab results from this visit (if applicable):  Results for orders placed or performed during the hospital encounter of 02/14/22   CBC Auto Differential   Result Value Ref Range    WBC 7.1 4.0 - 10.5 K/CU MM    RBC 4.15 (L) 4.6 - 6.2 M/CU MM    Hemoglobin 13.0 (L) 13.5 - 18.0 GM/DL    Hematocrit 39.6 (L) 42 - 52 %    MCV 95.4 78 - 100 FL    MCH 31.3 (H) 27 - 31 PG    MCHC 32.8 32.0 - 36.0 %    RDW 13.5 11.7 - 14.9 %    Platelets 236 176 - 338 K/CU MM    MPV 10.8 7.5 - 11.1 FL    Differential Type AUTOMATED DIFFERENTIAL     Segs Relative 55.7 36 - 66 %    Lymphocytes % 27.7 24 - 44 %    Monocytes % 9.3 (H) 0 - 4 %    Eosinophils % 6.2 (H) 0 - 3 %    Basophils % 0.8 0 - 1 %    Segs Absolute 4.0 K/CU MM    Lymphocytes Absolute 2.0 K/CU MM    Monocytes Absolute 0.7 K/CU MM    Eosinophils Absolute 0.4 K/CU MM    Basophils Absolute 0.1 K/CU MM    Nucleated RBC % 0.0 %    Total Nucleated RBC 0.0 K/CU MM    Total Immature Neutrophil 0.02 K/CU MM    Immature Neutrophil % 0.3 0 - 0.43 %   Comprehensive Metabolic Panel w/ Reflex to MG   Result Value Ref Range    Sodium 140 135 - 145 MMOL/L    Potassium 4.3 3.5 - 5.1 MMOL/L    Chloride 105 99 - 110 mMol/L    CO2 27 21 - 32 MMOL/L    BUN 20 6 - 23 MG/DL    CREATININE 1.3 0.9 - 1.3 MG/DL    Glucose 97 70 - 99 MG/DL    Calcium 9.4 8.3 - 10.6 MG/DL    Albumin 3.9 3.4 - 5.0 GM/DL    Total Protein 6.6 6.4 - 8.2 GM/DL    Total Bilirubin 0.3 0.0 - 1.0 MG/DL    ALT 9 (L) 10 - 40 U/L    AST 11 (L) 15 - 37 IU/L    Alkaline Phosphatase 104 40 - 128 IU/L    GFR Non- 56 (L) >60 mL/min/1.73m2    GFR African American >60 >60 mL/min/1.73m2    Anion Gap 8 4 - 16   Troponin   Result Value Ref Range    Troponin T <0.010 <0.01 NG/ML   Lipase   Result Value Ref Range Lipase 20 13 - 60 IU/L   Brain Natriuretic Peptide   Result Value Ref Range    Pro-.5 (H) <300 PG/ML   Troponin   Result Value Ref Range    Troponin T <0.010 <0.01 NG/ML   EKG 12 Lead   Result Value Ref Range    Ventricular Rate 60 BPM    Atrial Rate 60 BPM    P-R Interval 286 ms    QRS Duration 176 ms    Q-T Interval 456 ms    QTc Calculation (Bazett) 456 ms    P Axis 74 degrees    R Axis -44 degrees    T Axis 128 degrees    Diagnosis       Atrial-paced rhythm with prolonged AV conduction  Left axis deviation  Left bundle branch block  Abnormal ECG  When compared with ECG of 23-NOV-2021 03:21,  T wave inversion no longer evident in Inferior leads  T wave inversion more evident in Lateral leads  Confirmed by Shabana Wallace MD, Lito Bryson ([id number]) on 2/14/2022 12:51:08 PM        Radiographs (if obtained):  [] The following radiograph was interpreted by myself in the absence of a radiologist:   [] Radiologist's Report Reviewed:  XR CHEST PORTABLE   Final Result   Increased perihilar markings. Atelectasis, infectious or inflammatory airway   process, and interstitial edema are in the differential. No airspace disease   by radiograph. EKG (if obtained):   Please See Note of attending physician for EKG interpretation. Chart review shows recent radiograph(s):  No results found. MDM:     Interventions given this visit:   Orders Placed This Encounter   Medications    aspirin chewable tablet 324 mg     Patient presents today to the emergency department. With chest pain. Has a very significant history of coronary artery disease. Given his long history of coronary disease. Chest pain that was relieved with nitro. Upon initial evaluation I did tell patient that admission was very very likely. He in no uncertain terms that he is absolutely not getting admitted here to the hospital.  States that his pain is better his pain was better before I saw him.   And he is not staying here in the hospital.  Patient is of sound mind and is complicated up to make his own medical decisions. I was able to convince patient to stay for a delta troponin after 3 hours. The troponin is negative here in the emergency department. Negative EKG chest x-ray CT scan of the chest CT angiogram the chest and 3 out of the troponin. Patient will be discharged. Told to follow-up with cardiologist. Patient will leave [address] as he is high risk for cardil          I independently managed patient today in the ED  /67   Pulse 60   Temp 98.2 °F (36.8 °C) (Oral)   Resp 18   SpO2 97%       Clinical Impression:  1. Chest pain, unspecified type        Disposition referral (if applicable): Brianne Cerrato MD  [address] [address]  [phone]    In 1 day      [facility] Emergency Department  [facility] [id number]  [phone]    If symptoms worsen or persist    Disposition medications (if applicable):  Discharge Medication List as of 2/14/2022  8:28 AM            Comment: Please note this report has been produced using speech recognition software and may contain errors related to that system including errors in grammar, punctuation, and spelling, as well as words and phrases that may be inappropriate. If there are any questions or concerns please feel free to contact the dictating provider for clarification.       Annabel Stark, [address] [place] [address]  02/19/22 1947

## 2022-02-14 NOTE — ED PROVIDER NOTES
EKG Interpretation    Interpreted by emergency department physician    Rhythm: Atrial paced rhythm with prolonged AV conduction  Rate: tachycardia  Axis: left  Ectopy: none  Conduction: left bundle branch block (complete)  ST Segments: normal  T Waves: normal  Q Waves: none    Clinical Impression: Atrial paced rhythm with prolonged AV conduction and left bundle branch block    MD Tonya Chan MD  02/14/22 [id number]

## 2022-02-14 NOTE — ED NOTES
Bed: ED-25  Expected date:   Expected time:   Means of arrival:   Comments:  ems     Sharona Aaron RN  02/14/22 0009

## 2022-03-25 ENCOUNTER — PROCEDURE VISIT (OUTPATIENT)
Dept: CARDIOLOGY CLINIC | Age: 67
End: 2022-03-25
Payer: MEDICARE

## 2022-03-25 DIAGNOSIS — I49.5 SINUS NODE DYSFUNCTION (HCC): ICD-10-CM

## 2022-03-25 DIAGNOSIS — Z95.810 ICD (IMPLANTABLE CARDIOVERTER-DEFIBRILLATOR), BIVENTRICULAR, IN SITU: Primary | ICD-10-CM

## 2022-03-25 PROCEDURE — 93297 REM INTERROG DEV EVAL ICPMS: CPT | Performed by: INTERNAL MEDICINE

## 2022-03-25 PROCEDURE — 93295 DEV INTERROG REMOTE 1/2/MLT: CPT | Performed by: INTERNAL MEDICINE

## 2022-03-25 PROCEDURE — 93296 REM INTERROG EVL PM/IDS: CPT | Performed by: INTERNAL MEDICINE

## 2022-05-21 ENCOUNTER — HOSPITAL ENCOUNTER (EMERGENCY)
Age: 67
Discharge: HOME | End: 2022-05-21
Attending: EMERGENCY MEDICINE
Payer: MEDICARE

## 2022-05-21 ENCOUNTER — APPOINTMENT (OUTPATIENT)
Dept: GENERAL RADIOLOGY | Age: 67
End: 2022-05-21
Payer: MEDICARE

## 2022-05-21 VITALS
OXYGEN SATURATION: 99 % | RESPIRATION RATE: 11 BRPM | HEART RATE: 80 BPM | DIASTOLIC BLOOD PRESSURE: 81 MMHG | SYSTOLIC BLOOD PRESSURE: 119 MMHG

## 2022-05-21 DIAGNOSIS — R07.9 CHEST PAIN, UNSPECIFIED TYPE: Primary | ICD-10-CM

## 2022-05-21 DIAGNOSIS — Z79.01 WARFARIN ANTICOAGULATION: ICD-10-CM

## 2022-05-21 DIAGNOSIS — R79.89 ELEVATED BRAIN NATRIURETIC PEPTIDE (BNP) LEVEL: ICD-10-CM

## 2022-05-21 LAB
ALBUMIN SERPL-MCNC: 3.9 GM/DL (ref 3.4–5)
ALP BLD-CCNC: 91 IU/L (ref 40–129)
ALT SERPL-CCNC: 16 U/L (ref 10–40)
ANION GAP SERPL CALCULATED.3IONS-SCNC: 9 MMOL/L (ref 4–16)
APTT: 37.3 SECONDS (ref 25.1–37.1)
AST SERPL-CCNC: 20 IU/L (ref 15–37)
BASOPHILS ABSOLUTE: 0 K/CU MM
BASOPHILS RELATIVE PERCENT: 0.7 % (ref 0–1)
BILIRUB SERPL-MCNC: 0.2 MG/DL (ref 0–1)
BUN BLDV-MCNC: 18 MG/DL (ref 6–23)
CALCIUM SERPL-MCNC: 8.8 MG/DL (ref 8.3–10.6)
CHLORIDE BLD-SCNC: 99 MMOL/L (ref 99–110)
CO2: 23 MMOL/L (ref 21–32)
CREAT SERPL-MCNC: 1.3 MG/DL (ref 0.9–1.3)
D DIMER: <200 NG/ML(DDU)
DIFFERENTIAL TYPE: ABNORMAL
DIGOXIN LEVEL: 1.1 NG/ML (ref 0.8–2)
DOSE AMOUNT: NORMAL
DOSE TIME: NORMAL
EOSINOPHILS ABSOLUTE: 0.3 K/CU MM
EOSINOPHILS RELATIVE PERCENT: 5.5 % (ref 0–3)
GFR AFRICAN AMERICAN: >60 ML/MIN/1.73M2
GFR NON-AFRICAN AMERICAN: 56 ML/MIN/1.73M2
GLUCOSE BLD-MCNC: 92 MG/DL (ref 70–99)
HCT VFR BLD CALC: 36.6 % (ref 42–52)
HEMOGLOBIN: 12 GM/DL (ref 13.5–18)
IMMATURE NEUTROPHIL %: 0.2 % (ref 0–0.43)
INR BLD: 2.94 INDEX
LYMPHOCYTES ABSOLUTE: 1.8 K/CU MM
LYMPHOCYTES RELATIVE PERCENT: 33 % (ref 24–44)
MCH RBC QN AUTO: 31.6 PG (ref 27–31)
MCHC RBC AUTO-ENTMCNC: 32.8 % (ref 32–36)
MCV RBC AUTO: 96.3 FL (ref 78–100)
MONOCYTES ABSOLUTE: 0.6 K/CU MM
MONOCYTES RELATIVE PERCENT: 11.7 % (ref 0–4)
NUCLEATED RBC %: 0 %
PDW BLD-RTO: 13.9 % (ref 11.7–14.9)
PLATELET # BLD: 135 K/CU MM (ref 140–440)
PMV BLD AUTO: 11.4 FL (ref 7.5–11.1)
POTASSIUM SERPL-SCNC: 4 MMOL/L (ref 3.5–5.1)
PRO-BNP: 1100 PG/ML
PROTHROMBIN TIME: 38.4 SECONDS (ref 11.7–14.5)
RBC # BLD: 3.8 M/CU MM (ref 4.6–6.2)
SEGMENTED NEUTROPHILS ABSOLUTE COUNT: 2.7 K/CU MM
SEGMENTED NEUTROPHILS RELATIVE PERCENT: 48.9 % (ref 36–66)
SODIUM BLD-SCNC: 131 MMOL/L (ref 135–145)
TOTAL IMMATURE NEUTOROPHIL: 0.01 K/CU MM
TOTAL NUCLEATED RBC: 0 K/CU MM
TOTAL PROTEIN: 6.3 GM/DL (ref 6.4–8.2)
TROPONIN T: <0.01 NG/ML
WBC # BLD: 5.5 K/CU MM (ref 4–10.5)

## 2022-05-21 PROCEDURE — 71046 X-RAY EXAM CHEST 2 VIEWS: CPT

## 2022-05-21 PROCEDURE — 84484 ASSAY OF TROPONIN QUANT: CPT

## 2022-05-21 PROCEDURE — 99285 EMERGENCY DEPT VISIT HI MDM: CPT

## 2022-05-21 PROCEDURE — 85730 THROMBOPLASTIN TIME PARTIAL: CPT

## 2022-05-21 PROCEDURE — 80162 ASSAY OF DIGOXIN TOTAL: CPT

## 2022-05-21 PROCEDURE — 85379 FIBRIN DEGRADATION QUANT: CPT

## 2022-05-21 PROCEDURE — 80053 COMPREHEN METABOLIC PANEL: CPT

## 2022-05-21 PROCEDURE — 83880 ASSAY OF NATRIURETIC PEPTIDE: CPT

## 2022-05-21 PROCEDURE — 93005 ELECTROCARDIOGRAM TRACING: CPT | Performed by: EMERGENCY MEDICINE

## 2022-05-21 PROCEDURE — 93010 ELECTROCARDIOGRAM REPORT: CPT | Performed by: INTERNAL MEDICINE

## 2022-05-21 PROCEDURE — 85025 COMPLETE CBC W/AUTO DIFF WBC: CPT

## 2022-05-21 PROCEDURE — 85610 PROTHROMBIN TIME: CPT

## 2022-05-21 ASSESSMENT — HEART SCORE: ECG: 1

## 2022-05-21 NOTE — ED TRIAGE NOTES
Patient brought in by squad for chest pain that began approximately an hour to two hours ago. Patient rates chest pain at a 6 out of 10. Patient received nitro at his facility and aspirin in the squad.

## 2022-05-21 NOTE — ED NOTES
Discharged instruction reviewed with patient. All questions addressed. Patient alert and oriented x4 at discharge. Patient verbalized understanding. [facility] to take patient home.       Lavern Simpson RN  05/21/22 5800

## 2022-05-21 NOTE — ED PROVIDER NOTES
Emergency Department Encounter    Patient: Sylvia Patel  MRN: [id number]  : 1955  Date of Evaluation: 2022  ED Provider:  Lety Gnozalez MD      Triage Chief Complaint:   Chest Pain      [place]:  Sylvia Patel is a 61 y.o. male that presents to the emergency department with chest pain intermittently over the past 3 nights. Patient reports that he has been having episodes of a sharp quality pain felt in the low sternum. Onset has generally been at rest.  He does not report any particular change with movement, exertion, deep breath, or cough. He has noticed some discomfort moving to the left arm. He reports \"a little\" nausea at times, but there has been no vomiting. He denies any worsening cough or sputum production. There have been no fevers. He denies any unusual calf pain or swelling. Patient does report history of coronary artery disease and that he underwent stress testing sometime late last year. Patient reports no other particular provocative or alleviating factors. ROS - see HPI, below listed is current ROS at time of my eval:  CONSTITUTIONAL: No fevers, chills, or sweats. EYES: No eye complaints. HENT: No upper respiratory complaints. RESPIRATORY: No shortness of breath, cough, or sputum production. CARDIOVASCULAR: As above. GASTROINTESTINAL: No nausea, vomiting, or abdominal pain. GENITOURINARY: No urinary complaints. MUSCULOSKELETAL: No recent injury. No neck, back, or extremity pain. NEUROLOGICAL: No focal weakness, numbness, or tingling. SKIN: No rashes or other lesions reported. No yellowing of the skin.     Medical history:  Past Medical History:   Diagnosis Date    A-fib [facility])     on Coumadin    Abdominal pain 3/22/2012    Acute on chronic renal failure (HCC)     Allergic rhinitis     Bipolar affective disorder unspecified     possible schziophrenia per pt- Seeing Dr. Evans Kaur of left shoulder 2007    s/p injection     CAD (coronary artery disease)     see Leonidas    Cardiomyopathy ([facility] [address]     NYHA FC II-III    CHF (NYHA class III, ACC/AHA stage C) ([facility] [address] 4/27/2012    Cholelithiasis 5/8/2012    per CT    Chronic kidney disease (CKD)     Chronic pain     on Ultram- dispensed at [facility] per CSP/psych program    Depression     Displacement of electrode lead of cardiac pacemaker 10/4/2019    10/4/2019 RV lead got pulled back in the connector    DJD (degenerative joint disease) of cervical spine     Elevated serum creatinine     Erectile dysfunction     Fractures     Left collar bone x8, left wrist, bilateral toes    GERD (gastroesophageal reflux disease)     GERD (gastroesophageal reflux disease)     H/O echocardiogram 03/26/15    EF 20% Mod dilated LV with severly reduced systolic function. pacer wire noted in right ventricle and right atrium.      Hiatal hernia 5/8/2012    per CT    History of nuclear stress test 05/01/2017    lexiscan-large anteroapical MI, no new changes, EF16%    Hypertension     Hypothyroid     Started by Dr. Toure Salvage Ischemic heart disease     Lipidemia 4/27/2012    Lithium toxicity     4/2015    MI, old 4/27/2012    Paronychia 3/12/2012    S/P cardiac cath 2/21/14    Schizoaffective disorder [facility])     old chart also gives hx of paranoid schizo    Unintentional weight loss 3/22/2012    Possible due to bipolar/tabby/ paranoia (sept 2012) with refusing to eat food from the grocery store believing it's being poisoned      Past Surgical History:   Procedure Laterality Date    ANGIOPLASTY      per old chart pt had angioplasty (LAD) with cutting balloon and arthrectomy done 7/2010    CARDIAC CATHETERIZATION      per old chart pt had cardiac cath 12/2010, 2/2011,4/2012, and 2/2014    CARDIAC SURGERY      arteriogram; stent    CHOLECYSTECTOMY      ENDOSCOPY, COLON, DIAGNOSTIC  9/2015    GASTRIC FUNDOPLICATION  53/46/7974    Robotic laparoscopic assisted nissen, cholecystectomy    HIATAL HERNIA REPAIR  10/7/15    PACEMAKER PLACEMENT      per old chart pt had biV ICD inserted 1/2011 and then 5/2014 had left ventricular lead replacement done    SINUS SURGERY      with allergy testing done in the past- Dr. Sandi Ibarra (([facility])    TONSILLECTOMY       Family History   Problem Relation Age of Onset    Mental Illness Mother         substance abuse    Mental Illness Sister     Mental Illness Sister      Social History     Socioeconomic History    Marital status:      Spouse name: Not on file    Number of children: Not on file    Years of education: Not on file    Highest education level: Not on file   Occupational History    Not on file   Tobacco Use    Smoking status: Current Every Day Smoker     Packs/day: 0.50     Years: 46.00     Pack years: 23.00     Types: Cigarettes    Smokeless tobacco: Never Used   Vaping Use    Vaping Use: Never used   Substance and Sexual Activity    Alcohol use: No     Alcohol/week: 0.0 standard drinks    Drug use: No    Sexual activity: Not Currently   Other Topics Concern    Not on file   Social History Narrative    Not on file     Social Determinants of Health     Financial Resource Strain:     Difficulty of Paying Living Expenses: Not on file   Food Insecurity:     Worried About [facility] in the Last Year: Not on file    Yimi of Food in the Last Year: Not on file   Transportation Needs:     Lack of Transportation (Medical): Not on file    Lack of Transportation (Non-Medical):  Not on file   Physical Activity:     Days of Exercise per Week: Not on file    Minutes of Exercise per Session: Not on file   Stress:     Feeling of Stress : Not on file   Social Connections:     Frequency of Communication with Friends and Family: Not on file    Frequency of Social Gatherings with Friends and Family: Not on file    Attends Catholic Services: Not on file    Active Member of Clubs or Organizations: Not on file    Attends Club or Organization Meetings: Not on file    Marital Status: Not on file   Intimate Partner Violence:     Fear of Current or Ex-Partner: Not on file    Emotionally Abused: Not on file    Physically Abused: Not on file    Sexually Abused: Not on file   Housing Stability:     Unable to Pay for Housing in the Last Year: Not on file    Number of [place] in the Last Year: Not on file    Unstable Housing in the Last Year: Not on file     No current facility-administered medications for this encounter. Current Outpatient Medications   Medication Sig Dispense Refill    amiodarone (CORDARONE) 200 MG tablet Take 1 tablet by mouth daily 90 tablet 3    traMADol (ULTRAM) 50 MG tablet Take 1 tablet by mouth every 6 hours as needed for Pain Moderate (4-6).  digoxin (LANOXIN) 125 MCG tablet Take 1 tablet by mouth daily 2 tab every 12 hours x 48 hours then one daily. 30 tablet 3    metoprolol succinate (TOPROL XL) 25 MG extended release tablet Take 1 tablet by mouth daily 30 tablet 3    HYDROcodone-acetaminophen (NORCO) 5-325 MG per tablet 1 tablet 3 times daily as needed.  ziprasidone (GEODON) 40 MG capsule 1 capsule 2 times daily      diltiazem (CARDIZEM) 30 MG tablet Take 1 tablet by mouth every 8 hours (Patient taking differently: Take 45 mg by mouth every 8 hours Pt takes 1.5 tablets daily) 120 tablet 3    warfarin (COUMADIN) 2.5 MG tablet Take 2 mg by mouth Daily with supper       acetaminophen (TYLENOL) 325 MG tablet Take 650 mg by mouth every 4 hours as needed for Pain      ezetimibe (ZETIA) 10 MG tablet Take 10 mg by mouth daily      atorvastatin (LIPITOR) 80 MG tablet Take 1 tablet by mouth daily 90 tablet 3    nitroGLYCERIN (NITROSTAT) 0.3 MG SL tablet Place 0.3 mg under the tongue every 5 minutes as needed for Chest pain      levothyroxine (SYNTHROID) 75 MCG tablet Take 75 mcg by mouth Daily.       benztropine (COGENTIN) 1 MG tablet Take 1 mg by mouth 2 times daily       loratadine (CLARITIN) 10 MG tablet Take 1 tablet Soft, nontender, and nondistended. No McBurney's or Velásquez's point tenderness. No other focal tenderness. No involuntary guarding, rebound, or rigidity. No mass or pulsatile mass. Bowel sounds normal.    NEUROLOGICAL: Awake, alert and oriented x 3. GCS 15. Cranial nerves III through XII are grossly intact as tested without facial droop or dermatomal paresthesias. Of note, forehead wrinkles are symmetric and intact. Conjugate gaze without entrapment. No asymmetry of the corners of the mouth or nasolabial folds. No gross motor or cerebellar deficits. BACK/MUSCULOSKELETAL: No asymmetric edema or calf tenderness. No Homans sign or cords. SKIN: Normal tone for ethnicity. Mildly diminished turgor and brisk capillary refill peripherally. No petechiae, purpura, vesicles, bullae, or other lesions. No icterus. PSYCHIATRIC: Normal mood. Normal affect. Patient does not respond to internal stimuli. Emergency department course. Patient is brought to bed [facility]-4 and assessed and reassessed by me. Prior to initial evaluation, orders are placed for medical screening studies including CBC, metabolic panel, coagulation panel, digoxin level, and a first troponin among others. EKG shows paced rhythm without criteria ST elevation or reciprocal changes. After initial evaluation, medical screening studies are pending. Abdomen is nonsurgical.  Patient is agreeable to continuing plan. Upon most recent reevaluation, patient reports that he is pain-free. Initial testing does not show definitive etiology. We have discussed options for management and evaluation including repeat heart testing and possible evaluation in the hospital.  He reports that he underwent stress testing last year, and he does not want to be admitted. Heart score is a 4 or 5 depending on whether his symptoms are considered slightly or moderately suspicious, placing him in the medium risk cohort.   As he is currently pain-free and appears in no acute distress, I believe he has capacity to understand the discussed risks and benefits. We have discussed all available results. Patient is satisfied with evaluation and agreeable to recommendations. Patient has had the opportunity to ask questions, and they have been answered to the best of my ability. Instructions are given to follow-up with primary care provider for reevaluation and further testing. Very strict return and follow-up instructions are provided. Patient seen during [facility], I did don appropriate PPE during my encounters with the patient, including n95 (when appropriate) mask and eye protection as appropriate.     I have reviewed and interpreted all of the currently available lab results from this visit (if applicable):  Results for orders placed or performed during the hospital encounter of 05/21/22   CBC with Auto Differential   Result Value Ref Range    WBC 5.5 4.0 - 10.5 K/CU MM    RBC 3.80 (L) 4.6 - 6.2 M/CU MM    Hemoglobin 12.0 (L) 13.5 - 18.0 GM/DL    Hematocrit 36.6 (L) 42 - 52 %    MCV 96.3 78 - 100 FL    MCH 31.6 (H) 27 - 31 PG    MCHC 32.8 32.0 - 36.0 %    RDW 13.9 11.7 - 14.9 %    Platelets 613 (L) 017 - 440 K/CU MM    MPV 11.4 (H) 7.5 - 11.1 FL    Differential Type AUTOMATED DIFFERENTIAL     Segs Relative 48.9 36 - 66 %    Lymphocytes % 33.0 24 - 44 %    Monocytes % 11.7 (H) 0 - 4 %    Eosinophils % 5.5 (H) 0 - 3 %    Basophils % 0.7 0 - 1 %    Segs Absolute 2.7 K/CU MM    Lymphocytes Absolute 1.8 K/CU MM    Monocytes Absolute 0.6 K/CU MM    Eosinophils Absolute 0.3 K/CU MM    Basophils Absolute 0.0 K/CU MM    Nucleated RBC % 0.0 %    Total Nucleated RBC 0.0 K/CU MM    Total Immature Neutrophil 0.01 K/CU MM    Immature Neutrophil % 0.2 0 - 0.43 %   Comprehensive Metabolic Panel w/ Reflex to MG   Result Value Ref Range    Sodium 131 (L) 135 - 145 MMOL/L    Potassium 4.0 3.5 - 5.1 MMOL/L    Chloride 99 99 - 110 mMol/L    CO2 23 21 - 32 MMOL/L    BUN 18 6 - 23 MG/DL CREATININE 1.3 0.9 - 1.3 MG/DL    Glucose 92 70 - 99 MG/DL    Calcium 8.8 8.3 - 10.6 MG/DL    Albumin 3.9 3.4 - 5.0 GM/DL    Total Protein 6.3 (L) 6.4 - 8.2 GM/DL    Total Bilirubin 0.2 0.0 - 1.0 MG/DL    ALT 16 10 - 40 U/L    AST 20 15 - 37 IU/L    Alkaline Phosphatase 91 40 - 129 IU/L    GFR Non- 56 (L) >60 mL/min/1.73m2    GFR African American >60 >60 mL/min/1.73m2    Anion Gap 9 4 - 16   Troponin   Result Value Ref Range    Troponin T <0.010 <0.01 NG/ML   Brain Natriuretic Peptide   Result Value Ref Range    Pro-BNP 1,100 (H) <300 PG/ML   Protime-INR   Result Value Ref Range    Protime 38.4 (H) 11.7 - 14.5 SECONDS    INR 2.94 INDEX   APTT   Result Value Ref Range    aPTT 37.3 (H) 25.1 - 37.1 SECONDS   Digoxin Level   Result Value Ref Range    Digoxin Lvl 1.1 0.8 - 2.0 ng/mL    DOSE AMOUNT DOSE AMT. GIVEN - UNKNOWN     DOSE TIME DOSE TIME GIVEN - UNKNOWN    D-Dimer, Quantitative   Result Value Ref Range    D-Dimer, Quant <200 <230 NG/mL(DDU)   EKG 12 Lead   Result Value Ref Range    Ventricular Rate 60 BPM    Atrial Rate 60 BPM    P-R Interval 304 ms    QRS Duration 192 ms    Q-T Interval 468 ms    QTc Calculation (Bazett) 468 ms    P Axis 61 degrees    R Axis -43 degrees    T Axis 139 degrees    Diagnosis       Atrial-paced rhythm with prolonged AV conduction  Left axis deviation  Left bundle branch block  Abnormal ECG  When compared with ECG of 14-FEB-2022 01:36,  T wave inversion more evident in Anterior leads  Confirmed by BENY Kyle ([id number]) on 5/21/2022 3:12:46 PM          Radiographs (if obtained):  Radiologist's Report Reviewed:  XR CHEST (2 VW)    Result Date: 5/21/2022  EXAMINATION: TWO XRAY VIEWS OF THE CHEST 5/21/2022 2:06 am COMPARISON: 02/14/2022. HISTORY: ORDERING SYSTEM PROVIDED HISTORY: Chest pain TECHNOLOGIST PROVIDED HISTORY: Reason for exam:->Chest pain Reason for Exam: Chest pain FINDINGS: Frontal and lateral views. Hyperexpanded lung volume.   Diffusely prominent interstitium. No focal consolidation. Biapical pleuroparenchymal scarring. No pleural effusion or pneumothorax. Cardiomegaly. Left pectoral trans venous cardiac pacer/ICD. No acute osseous abnormality. 1.  Hyperexpanded lung volume can be seen in the setting of COPD. 2.  Cardiomegaly and pulmonary vascular congestion. 3.  No focal pulmonary consolidation. EKG:  Twelve-lead EKG obtained at 0053 on 20 May 2022 and interpreted by me in the absence of a cardiologist.  There is no criteria ST elevation or reciprocal change. There are no hyperacute T wave changes. There is no sign of acute ischemia or infarction. This tracing shows a atrial paced rhythm. Rate and intervals are 60 beats per minute, TN interval 304 milliseconds, QRS duration 192 milliseconds, QTc interval 468 milliseconds, and R axis left shifted at -43 degrees. There is no acute change compared with the most recent EKG dated February 14, 2022. Additional data:  Most recent nuclear medicine cardiac stress test available through the system November 2021 indicates:  Summary    Large sized defect of moderate severity which is persistent involving apical    wall of myocardium. Abnormal Lexiscan nuclear scintigraphic study suggestive    of abnormal myocardial perfusion. Gated images demonstrate abnormal left    ventricular systolic function with EF of 18 %. Nuclear scintigraphy    demonstartes anterior wall infarct.    No new changes from prev Study       Medical decision making:  Patient presents to the emergency department with episodic chest discomfort over the past several days. Consider an anginal equivalent, but troponin and EKG do not show detectable ischemia or infarction. We have discussed further testing and monitoring in the hospital, but he has politely declined after we have discussed risks and benefits. INR is therapeutic, and patient appears low risk for venous thromboembolic disease including pulmonary embolism.   There is no asymmetric calf pain or swelling, sustained tachycardia, hypoxia, or cyanosis. BNP is elevated, but lung sounds and imaging do not strongly suggest fulminant pulmonary edema. Consider exacerbation of COPD, but there has been no respiratory distress or worsening productive cough. Abdominal exam does not suggest mesenteric ischemia, aortic catastrophe, urogenital, or other surgical emergency. There is no sign of other focal infection such as pneumonia or urinary tract infection including pyelonephritis. Sepsis is clinically unlikely. There is no hemodynamic instability, fever, hypoxia, cyanosis, or respiratory distress. There is no intractable vomiting. Procedures: None. Consultations: None. Clinical Impression:  1. Chest pain, unspecified type    2. Warfarin anticoagulation    3. Elevated brain natriuretic peptide (BNP) level      Disposition referral (if applicable): Blanca Lugo MD  [address] [place] [address]  [phone]    Schedule an appointment as soon as possible for a visit   For primary care follow-up    Gelacio Palma MD  [address] [address]  [phone]    Schedule an appointment as soon as possible for a visit   For cardiology follow-up    [facility] Emergency Department  [place] [id number] [phone]  Go to   As needed, If symptoms worsen    Disposition medications (if applicable):  Discharge Medication List as of 5/21/2022  5:35 AM        ED Provider Disposition Time  DISPOSITION Decision To Discharge 05/21/2022 09:36:04 AM      Comment: Please note this report has been produced using speech recognition software and may contain errors related to that system including errors in grammar, punctuation, and spelling, as well as words and phrases that may be inappropriate. Efforts were made to edit the dictations.         Sarath Gongora MD  05/21/22 0026

## 2022-06-15 LAB
EKG ATRIAL RATE: 60 BPM
EKG DIAGNOSIS: NORMAL
EKG P AXIS: 61 DEGREES
EKG P-R INTERVAL: 304 MS
EKG Q-T INTERVAL: 468 MS
EKG QRS DURATION: 192 MS
EKG QTC CALCULATION (BAZETT): 468 MS
EKG R AXIS: -43 DEGREES
EKG T AXIS: 139 DEGREES
EKG VENTRICULAR RATE: 60 BPM

## 2022-06-19 PROCEDURE — 93297 REM INTERROG DEV EVAL ICPMS: CPT | Performed by: INTERNAL MEDICINE

## 2022-06-19 PROCEDURE — 93296 REM INTERROG EVL PM/IDS: CPT | Performed by: INTERNAL MEDICINE

## 2022-06-19 PROCEDURE — 93295 DEV INTERROG REMOTE 1/2/MLT: CPT | Performed by: INTERNAL MEDICINE

## 2022-06-21 ENCOUNTER — TELEPHONE (OUTPATIENT)
Dept: CARDIOLOGY CLINIC | Age: 67
End: 2022-06-21

## 2022-06-21 ENCOUNTER — PROCEDURE VISIT (OUTPATIENT)
Dept: CARDIOLOGY CLINIC | Age: 67
End: 2022-06-21
Payer: MEDICARE

## 2022-06-21 DIAGNOSIS — Z95.810 ICD (IMPLANTABLE CARDIOVERTER-DEFIBRILLATOR), BIVENTRICULAR, IN SITU: Primary | ICD-10-CM

## 2022-06-21 DIAGNOSIS — I49.5 SINUS NODE DYSFUNCTION (HCC): ICD-10-CM

## 2022-06-21 NOTE — LETTER
Cardiology [address]  Phone: [phone]  Fax: [phone]    6/21/2022        [address]             Dear Gerald Rocha:    This is your [facility] schedule. You can omar your calendar with these dates. Remember that your device is wireless and should automatically do these checks while you are sleeping. If for any reason I do not get your transmission then I will call you and ask that you send a manual transmission. If you have any questions or concerns, please call and ask for Reshma Ellis at [phone]. Thank you.

## 2022-09-25 PROCEDURE — 93296 REM INTERROG EVL PM/IDS: CPT | Performed by: INTERNAL MEDICINE

## 2022-09-25 PROCEDURE — 93297 REM INTERROG DEV EVAL ICPMS: CPT | Performed by: INTERNAL MEDICINE

## 2022-09-25 PROCEDURE — 93295 DEV INTERROG REMOTE 1/2/MLT: CPT | Performed by: INTERNAL MEDICINE

## 2022-09-27 ENCOUNTER — PROCEDURE VISIT (OUTPATIENT)
Dept: CARDIOLOGY CLINIC | Age: 67
End: 2022-09-27
Payer: MEDICARE

## 2022-09-27 DIAGNOSIS — Z95.810 ICD (IMPLANTABLE CARDIOVERTER-DEFIBRILLATOR), BIVENTRICULAR, IN SITU: Primary | ICD-10-CM

## 2022-09-27 DIAGNOSIS — I49.5 SINUS NODE DYSFUNCTION (HCC): ICD-10-CM

## 2023-01-01 ENCOUNTER — APPOINTMENT (OUTPATIENT)
Dept: GENERAL RADIOLOGY | Age: 68
DRG: 291 | End: 2023-01-01
Payer: MEDICARE

## 2023-01-01 ENCOUNTER — APPOINTMENT (OUTPATIENT)
Dept: CT IMAGING | Age: 68
DRG: 291 | End: 2023-01-01
Payer: MEDICARE

## 2023-01-01 ENCOUNTER — HOSPITAL ENCOUNTER (INPATIENT)
Age: 68
LOS: 6 days | DRG: 291 | End: 2023-02-07
Attending: EMERGENCY MEDICINE | Admitting: STUDENT IN AN ORGANIZED HEALTH CARE EDUCATION/TRAINING PROGRAM
Payer: MEDICARE

## 2023-01-01 VITALS
TEMPERATURE: 101.2 F | DIASTOLIC BLOOD PRESSURE: 48 MMHG | HEART RATE: 98 BPM | SYSTOLIC BLOOD PRESSURE: 100 MMHG | WEIGHT: 177.91 LBS | OXYGEN SATURATION: 97 % | BODY MASS INDEX: 21.01 KG/M2 | RESPIRATION RATE: 36 BRPM | HEIGHT: 77 IN

## 2023-01-01 DIAGNOSIS — J96.21 ACUTE ON CHRONIC RESPIRATORY FAILURE WITH HYPOXIA (HCC): ICD-10-CM

## 2023-01-01 DIAGNOSIS — I50.9 ACUTE ON CHRONIC CONGESTIVE HEART FAILURE, UNSPECIFIED HEART FAILURE TYPE (HCC): ICD-10-CM

## 2023-01-01 DIAGNOSIS — E87.6 HYPOKALEMIA: Primary | ICD-10-CM

## 2023-01-01 DIAGNOSIS — J44.1 COPD EXACERBATION (HCC): ICD-10-CM

## 2023-01-01 DIAGNOSIS — J18.9 PNEUMONIA OF BOTH LUNGS DUE TO INFECTIOUS ORGANISM, UNSPECIFIED PART OF LUNG: ICD-10-CM

## 2023-01-01 LAB
ALBUMIN SERPL-MCNC: 3.2 GM/DL (ref 3.4–5)
ALBUMIN SERPL-MCNC: 3.3 GM/DL (ref 3.4–5)
ALBUMIN SERPL-MCNC: 4.1 GM/DL (ref 3.4–5)
ALP BLD-CCNC: 80 IU/L (ref 40–129)
ALP BLD-CCNC: 93 IU/L (ref 40–129)
ALP BLD-CCNC: 96 IU/L (ref 40–129)
ALP BLD-CCNC: 96 IU/L (ref 40–129)
ALP BLD-CCNC: 99 IU/L (ref 40–129)
ALT SERPL-CCNC: 1027 U/L (ref 10–40)
ALT SERPL-CCNC: 1661 U/L (ref 10–40)
ALT SERPL-CCNC: 20 U/L (ref 10–40)
ALT SERPL-CCNC: 296 U/L (ref 10–40)
ALT SERPL-CCNC: 53 U/L (ref 10–40)
ANION GAP SERPL CALCULATED.3IONS-SCNC: 11 MMOL/L (ref 4–16)
ANION GAP SERPL CALCULATED.3IONS-SCNC: 12 MMOL/L (ref 4–16)
ANION GAP SERPL CALCULATED.3IONS-SCNC: 15 MMOL/L (ref 4–16)
ANION GAP SERPL CALCULATED.3IONS-SCNC: 17 MMOL/L (ref 4–16)
ANION GAP SERPL CALCULATED.3IONS-SCNC: 19 MMOL/L (ref 4–16)
ANION GAP SERPL CALCULATED.3IONS-SCNC: 21 MMOL/L (ref 4–16)
ANION GAP SERPL CALCULATED.3IONS-SCNC: 21 MMOL/L (ref 4–16)
ANION GAP SERPL CALCULATED.3IONS-SCNC: 8 MMOL/L (ref 4–16)
ANION GAP SERPL CALCULATED.3IONS-SCNC: 9 MMOL/L (ref 4–16)
APTT: 58.1 SECONDS (ref 25.1–37.1)
APTT: 68.5 SECONDS (ref 25.1–37.1)
AST SERPL-CCNC: 1979 IU/L (ref 15–37)
AST SERPL-CCNC: 21 IU/L (ref 15–37)
AST SERPL-CCNC: 408 IU/L (ref 15–37)
AST SERPL-CCNC: 483 IU/L (ref 15–37)
AST SERPL-CCNC: 72 IU/L (ref 15–37)
B PARAP IS1001 DNA NPH QL NAA+NON-PROBE: NOT DETECTED
B PERT.PT PRMT NPH QL NAA+NON-PROBE: NOT DETECTED
BASE EXCESS MIXED: 2.3 (ref 0–1.2)
BASE EXCESS: 4 (ref 0–3.3)
BASE EXCESS: 4 (ref 0–3.3)
BASE EXCESS: 5 (ref 0–3.3)
BASE EXCESS: 6 (ref 0–3.3)
BASE EXCESS: 7 (ref 0–3.3)
BASE EXCESS: 7 (ref 0–3.3)
BASE EXCESS: 8 (ref 0–3.3)
BASOPHILS ABSOLUTE: 0 K/CU MM
BASOPHILS RELATIVE PERCENT: 0.1 % (ref 0–1)
BASOPHILS RELATIVE PERCENT: 0.3 % (ref 0–1)
BILIRUB SERPL-MCNC: 0.6 MG/DL (ref 0–1)
BILIRUB SERPL-MCNC: 0.7 MG/DL (ref 0–1)
BILIRUB SERPL-MCNC: 1 MG/DL (ref 0–1)
BILIRUB SERPL-MCNC: 1 MG/DL (ref 0–1)
BILIRUB SERPL-MCNC: 1.2 MG/DL (ref 0–1)
BILIRUBIN DIRECT: 0.4 MG/DL (ref 0–0.3)
BILIRUBIN DIRECT: 0.6 MG/DL (ref 0–0.3)
BILIRUBIN DIRECT: 0.7 MG/DL (ref 0–0.3)
BILIRUBIN DIRECT: 0.7 MG/DL (ref 0–0.3)
BILIRUBIN URINE: NEGATIVE MG/DL
BILIRUBIN, INDIRECT: 0.3 MG/DL (ref 0–0.7)
BILIRUBIN, INDIRECT: 0.3 MG/DL (ref 0–0.7)
BILIRUBIN, INDIRECT: 0.4 MG/DL (ref 0–0.7)
BILIRUBIN, INDIRECT: 0.5 MG/DL (ref 0–0.7)
BLOOD, URINE: NEGATIVE
BUN SERPL-MCNC: 17 MG/DL (ref 6–23)
BUN SERPL-MCNC: 18 MG/DL (ref 6–23)
BUN SERPL-MCNC: 22 MG/DL (ref 6–23)
BUN SERPL-MCNC: 23 MG/DL (ref 6–23)
BUN SERPL-MCNC: 24 MG/DL (ref 6–23)
BUN SERPL-MCNC: 27 MG/DL (ref 6–23)
BUN SERPL-MCNC: 29 MG/DL (ref 6–23)
BUN SERPL-MCNC: 33 MG/DL (ref 6–23)
BUN SERPL-MCNC: 39 MG/DL (ref 6–23)
BUN SERPL-MCNC: 45 MG/DL (ref 6–23)
BUN SERPL-MCNC: 55 MG/DL (ref 6–23)
C PNEUM DNA NPH QL NAA+NON-PROBE: NOT DETECTED
CALCIUM SERPL-MCNC: 7.5 MG/DL (ref 8.3–10.6)
CALCIUM SERPL-MCNC: 7.6 MG/DL (ref 8.3–10.6)
CALCIUM SERPL-MCNC: 8 MG/DL (ref 8.3–10.6)
CALCIUM SERPL-MCNC: 8.1 MG/DL (ref 8.3–10.6)
CALCIUM SERPL-MCNC: 8.1 MG/DL (ref 8.3–10.6)
CALCIUM SERPL-MCNC: 8.3 MG/DL (ref 8.3–10.6)
CALCIUM SERPL-MCNC: 8.3 MG/DL (ref 8.3–10.6)
CALCIUM SERPL-MCNC: 8.7 MG/DL (ref 8.3–10.6)
CARBON MONOXIDE, BLOOD: 1.1 % (ref 0–5)
CARBON MONOXIDE, BLOOD: 1.2 % (ref 0–5)
CARBON MONOXIDE, BLOOD: 1.2 % (ref 0–5)
CARBON MONOXIDE, BLOOD: 1.4 % (ref 0–5)
CARBON MONOXIDE, BLOOD: 1.6 % (ref 0–5)
CARBON MONOXIDE, BLOOD: 1.6 % (ref 0–5)
CHLORIDE BLD-SCNC: 101 MMOL/L (ref 99–110)
CHLORIDE BLD-SCNC: 102 MMOL/L (ref 99–110)
CHLORIDE BLD-SCNC: 104 MMOL/L (ref 99–110)
CHLORIDE BLD-SCNC: 94 MMOL/L (ref 99–110)
CHLORIDE BLD-SCNC: 94 MMOL/L (ref 99–110)
CHLORIDE BLD-SCNC: 95 MMOL/L (ref 99–110)
CHLORIDE BLD-SCNC: 96 MMOL/L (ref 99–110)
CHLORIDE BLD-SCNC: 98 MMOL/L (ref 99–110)
CHLORIDE BLD-SCNC: 99 MMOL/L (ref 99–110)
CHOLEST SERPL-MCNC: 123 MG/DL
CLARITY: CLEAR
CO2 CONTENT: 21.5 MMOL/L (ref 19–24)
CO2 CONTENT: 21.6 MMOL/L (ref 19–24)
CO2 CONTENT: 22.8 MMOL/L (ref 19–24)
CO2 CONTENT: 24.1 MMOL/L (ref 19–24)
CO2 CONTENT: 25 MMOL/L (ref 19–24)
CO2 CONTENT: 25.7 MMOL/L (ref 19–24)
CO2: 19 MMOL/L (ref 21–32)
CO2: 20 MMOL/L (ref 21–32)
CO2: 21 MMOL/L (ref 21–32)
CO2: 24 MMOL/L (ref 21–32)
CO2: 24 MMOL/L (ref 21–32)
CO2: 25 MMOL/L (ref 21–32)
CO2: 25 MMOL/L (ref 21–32)
CO2: 26 MMOL/L (ref 21–32)
CO2: 26 MMOL/L (ref 21–32)
COLOR: YELLOW
COMMENT UA: NORMAL
COMMENT: ABNORMAL
CREAT SERPL-MCNC: 1.2 MG/DL (ref 0.9–1.3)
CREAT SERPL-MCNC: 1.3 MG/DL (ref 0.9–1.3)
CREAT SERPL-MCNC: 1.3 MG/DL (ref 0.9–1.3)
CREAT SERPL-MCNC: 1.4 MG/DL (ref 0.9–1.3)
CREAT SERPL-MCNC: 1.5 MG/DL (ref 0.9–1.3)
CREAT SERPL-MCNC: 1.9 MG/DL (ref 0.9–1.3)
CREAT SERPL-MCNC: 2.5 MG/DL (ref 0.9–1.3)
CREAT SERPL-MCNC: 2.8 MG/DL (ref 0.9–1.3)
CREAT SERPL-MCNC: 3.3 MG/DL (ref 0.9–1.3)
CRP SERPL HS-MCNC: 301.2 MG/L
CRP SERPL HS-MCNC: 314.9 MG/L
CRP SERPL HS-MCNC: 330 MG/L
CULTURE: ABNORMAL
CULTURE: ABNORMAL
CULTURE: NORMAL
DIFFERENTIAL TYPE: ABNORMAL
DOSE AMOUNT: ABNORMAL
DOSE AMOUNT: NORMAL
DOSE TIME: ABNORMAL
DOSE TIME: NORMAL
EKG ATRIAL RATE: 104 BPM
EKG ATRIAL RATE: 120 BPM
EKG ATRIAL RATE: 90 BPM
EKG DIAGNOSIS: NORMAL
EKG P AXIS: 129 DEGREES
EKG P AXIS: 81 DEGREES
EKG P-R INTERVAL: 146 MS
EKG P-R INTERVAL: 200 MS
EKG P-R INTERVAL: 200 MS
EKG Q-T INTERVAL: 254 MS
EKG Q-T INTERVAL: 456 MS
EKG Q-T INTERVAL: 532 MS
EKG QRS DURATION: 202 MS
EKG QRS DURATION: 218 MS
EKG QRS DURATION: 84 MS
EKG QTC CALCULATION (BAZETT): 416 MS
EKG QTC CALCULATION (BAZETT): 557 MS
EKG QTC CALCULATION (BAZETT): 696 MS
EKG R AXIS: -13 DEGREES
EKG R AXIS: -24 DEGREES
EKG R AXIS: 60 DEGREES
EKG T AXIS: 134 DEGREES
EKG T AXIS: 145 DEGREES
EKG T AXIS: 153 DEGREES
EKG VENTRICULAR RATE: 103 BPM
EKG VENTRICULAR RATE: 162 BPM
EKG VENTRICULAR RATE: 90 BPM
EOSINOPHILS ABSOLUTE: 0 K/CU MM
EOSINOPHILS ABSOLUTE: 0.1 K/CU MM
EOSINOPHILS RELATIVE PERCENT: 0 % (ref 0–3)
EOSINOPHILS RELATIVE PERCENT: 0 % (ref 0–3)
EOSINOPHILS RELATIVE PERCENT: 0.5 % (ref 0–3)
EOSINOPHILS RELATIVE PERCENT: 0.5 % (ref 0–3)
FLUAV H1 2009 PAN RNA NPH NAA+NON-PROBE: NOT DETECTED
FLUAV H1 RNA NPH QL NAA+NON-PROBE: NOT DETECTED
FLUAV H3 RNA NPH QL NAA+NON-PROBE: NOT DETECTED
FLUAV RNA NPH QL NAA+NON-PROBE: NOT DETECTED
FLUBV RNA NPH QL NAA+NON-PROBE: NOT DETECTED
GFR SERPL CREATININE-BSD FRML MDRD: 20 ML/MIN/1.73M2
GFR SERPL CREATININE-BSD FRML MDRD: 24 ML/MIN/1.73M2
GFR SERPL CREATININE-BSD FRML MDRD: 28 ML/MIN/1.73M2
GFR SERPL CREATININE-BSD FRML MDRD: 39 ML/MIN/1.73M2
GFR SERPL CREATININE-BSD FRML MDRD: 51 ML/MIN/1.73M2
GFR SERPL CREATININE-BSD FRML MDRD: 56 ML/MIN/1.73M2
GFR SERPL CREATININE-BSD FRML MDRD: >60 ML/MIN/1.73M2
GLUCOSE SERPL-MCNC: 105 MG/DL (ref 70–99)
GLUCOSE SERPL-MCNC: 106 MG/DL (ref 70–99)
GLUCOSE SERPL-MCNC: 110 MG/DL (ref 70–99)
GLUCOSE SERPL-MCNC: 111 MG/DL (ref 70–99)
GLUCOSE SERPL-MCNC: 125 MG/DL (ref 70–99)
GLUCOSE SERPL-MCNC: 173 MG/DL (ref 70–99)
GLUCOSE SERPL-MCNC: 184 MG/DL (ref 70–99)
GLUCOSE SERPL-MCNC: 186 MG/DL (ref 70–99)
GLUCOSE SERPL-MCNC: 187 MG/DL (ref 70–99)
GLUCOSE SERPL-MCNC: 200 MG/DL (ref 70–99)
GLUCOSE SERPL-MCNC: 247 MG/DL (ref 70–99)
GLUCOSE, URINE: NEGATIVE MG/DL
HADV DNA NPH QL NAA+NON-PROBE: NOT DETECTED
HCO3 ARTERIAL: 20.2 MMOL/L (ref 18–23)
HCO3 ARTERIAL: 20.2 MMOL/L (ref 18–23)
HCO3 ARTERIAL: 21.1 MMOL/L (ref 18–23)
HCO3 ARTERIAL: 22.7 MMOL/L (ref 18–23)
HCO3 ARTERIAL: 23.1 MMOL/L (ref 18–23)
HCO3 ARTERIAL: 24.7 MMOL/L (ref 18–23)
HCO3 VENOUS: 19.6 MMOL/L (ref 19–25)
HCO3 VENOUS: 20.6 MMOL/L (ref 19–25)
HCO3 VENOUS: 20.7 MMOL/L (ref 19–25)
HCO3 VENOUS: 24.4 MMOL/L (ref 19–25)
HCOV 229E RNA NPH QL NAA+NON-PROBE: NOT DETECTED
HCOV HKU1 RNA NPH QL NAA+NON-PROBE: NOT DETECTED
HCOV NL63 RNA NPH QL NAA+NON-PROBE: NOT DETECTED
HCOV OC43 RNA NPH QL NAA+NON-PROBE: NOT DETECTED
HCT VFR BLD CALC: 30.4 % (ref 42–52)
HCT VFR BLD CALC: 30.8 % (ref 42–52)
HCT VFR BLD CALC: 32.3 % (ref 42–52)
HCT VFR BLD CALC: 39.6 % (ref 42–52)
HDLC SERPL-MCNC: 42 MG/DL
HEMOGLOBIN: 10.3 GM/DL (ref 13.5–18)
HEMOGLOBIN: 10.7 GM/DL (ref 13.5–18)
HEMOGLOBIN: 12.5 GM/DL (ref 13.5–18)
HEMOGLOBIN: 9.9 GM/DL (ref 13.5–18)
HMPV RNA NPH QL NAA+NON-PROBE: NOT DETECTED
HPIV1 RNA NPH QL NAA+NON-PROBE: NOT DETECTED
HPIV2 RNA NPH QL NAA+NON-PROBE: NOT DETECTED
HPIV3 RNA NPH QL NAA+NON-PROBE: NOT DETECTED
HPIV4 RNA NPH QL NAA+NON-PROBE: NOT DETECTED
IMMATURE NEUTROPHIL %: 0.2 % (ref 0–0.43)
IMMATURE NEUTROPHIL %: 0.5 % (ref 0–0.43)
IMMATURE NEUTROPHIL %: 0.6 % (ref 0–0.43)
IMMATURE NEUTROPHIL %: 0.8 % (ref 0–0.43)
INR BLD: 3.42 INDEX
INR BLD: 3.96 INDEX
INR BLD: 4.01 INDEX
INR BLD: 4.04 INDEX
INR BLD: 4.06 INDEX
INR BLD: 4.29 INDEX
KETONES, URINE: NEGATIVE MG/DL
LACTATE: 1 MMOL/L (ref 0.5–1.9)
LACTATE: 1.4 MMOL/L (ref 0.5–1.9)
LACTATE: 1.5 MMOL/L (ref 0.5–1.9)
LACTATE: 1.8 MMOL/L (ref 0.5–1.9)
LACTATE: 1.9 MMOL/L (ref 0.5–1.9)
LACTATE: 2 MMOL/L (ref 0.5–1.9)
LACTATE: 2.4 MMOL/L (ref 0.5–1.9)
LACTATE: 3.1 MMOL/L (ref 0.5–1.9)
LACTATE: 3.2 MMOL/L (ref 0.5–1.9)
LACTATE: 3.5 MMOL/L (ref 0.5–1.9)
LACTIC ACID, SEPSIS: 2.3 MMOL/L (ref 0.5–1.9)
LACTIC ACID, SEPSIS: 3.4 MMOL/L (ref 0.5–1.9)
LDLC SERPL CALC-MCNC: 70 MG/DL
LEUKOCYTE ESTERASE, URINE: NEGATIVE
LV EF: 25 %
LVEF MODALITY: NORMAL
LYMPHOCYTES ABSOLUTE: 0.2 K/CU MM
LYMPHOCYTES ABSOLUTE: 0.6 K/CU MM
LYMPHOCYTES ABSOLUTE: 0.8 K/CU MM
LYMPHOCYTES ABSOLUTE: 1.2 K/CU MM
LYMPHOCYTES RELATIVE PERCENT: 1.5 % (ref 24–44)
LYMPHOCYTES RELATIVE PERCENT: 13.1 % (ref 24–44)
LYMPHOCYTES RELATIVE PERCENT: 3.8 % (ref 24–44)
LYMPHOCYTES RELATIVE PERCENT: 6.2 % (ref 24–44)
Lab: ABNORMAL
Lab: NORMAL
M PNEUMO DNA NPH QL NAA+NON-PROBE: NOT DETECTED
MAGNESIUM: 1.8 MG/DL (ref 1.8–2.4)
MAGNESIUM: 1.8 MG/DL (ref 1.8–2.4)
MAGNESIUM: 1.9 MG/DL (ref 1.8–2.4)
MAGNESIUM: 2.8 MG/DL (ref 1.8–2.4)
MCH RBC QN AUTO: 30.7 PG (ref 27–31)
MCH RBC QN AUTO: 30.9 PG (ref 27–31)
MCH RBC QN AUTO: 31.1 PG (ref 27–31)
MCH RBC QN AUTO: 31.2 PG (ref 27–31)
MCHC RBC AUTO-ENTMCNC: 31.6 % (ref 32–36)
MCHC RBC AUTO-ENTMCNC: 32.6 % (ref 32–36)
MCHC RBC AUTO-ENTMCNC: 33.1 % (ref 32–36)
MCHC RBC AUTO-ENTMCNC: 33.4 % (ref 32–36)
MCV RBC AUTO: 93.1 FL (ref 78–100)
MCV RBC AUTO: 94.2 FL (ref 78–100)
MCV RBC AUTO: 94.4 FL (ref 78–100)
MCV RBC AUTO: 98 FL (ref 78–100)
METHEMOGLOBIN ARTERIAL: 1.1 %
METHEMOGLOBIN ARTERIAL: 1.2 %
METHEMOGLOBIN ARTERIAL: 1.2 %
METHEMOGLOBIN ARTERIAL: 1.7 %
MONOCYTES ABSOLUTE: 1.1 K/CU MM
MONOCYTES ABSOLUTE: 1.2 K/CU MM
MONOCYTES ABSOLUTE: 1.2 K/CU MM
MONOCYTES ABSOLUTE: 1.4 K/CU MM
MONOCYTES RELATIVE PERCENT: 11.9 % (ref 0–4)
MONOCYTES RELATIVE PERCENT: 8 % (ref 0–4)
MONOCYTES RELATIVE PERCENT: 9.4 % (ref 0–4)
MONOCYTES RELATIVE PERCENT: 9.8 % (ref 0–4)
NITRITE URINE, QUANTITATIVE: NEGATIVE
NUCLEATED RBC %: 0 %
O2 SAT, VEN: 43.7 % (ref 50–70)
O2 SAT, VEN: 80.9 % (ref 50–70)
O2 SAT, VEN: 89.7 % (ref 50–70)
O2 SAT, VEN: 94.3 % (ref 50–70)
O2 SATURATION: 95.2 % (ref 96–97)
O2 SATURATION: 95.6 % (ref 96–97)
O2 SATURATION: 96 % (ref 96–97)
O2 SATURATION: 96.2 % (ref 96–97)
O2 SATURATION: 96.8 % (ref 96–97)
O2 SATURATION: 97 % (ref 96–97)
PCO2 ARTERIAL: 31 MMHG (ref 32–45)
PCO2 ARTERIAL: 42 MMHG (ref 32–45)
PCO2 ARTERIAL: 45 MMHG (ref 32–45)
PCO2 ARTERIAL: 46 MMHG (ref 32–45)
PCO2 ARTERIAL: 54 MMHG (ref 32–45)
PCO2 ARTERIAL: 62 MMHG (ref 32–45)
PCO2, VEN: 40 MMHG (ref 38–52)
PCO2, VEN: 45 MMHG (ref 38–52)
PCO2, VEN: 49 MMHG (ref 38–52)
PCO2, VEN: 57 MMHG (ref 38–52)
PDW BLD-RTO: 14.3 % (ref 11.7–14.9)
PDW BLD-RTO: 14.4 % (ref 11.7–14.9)
PDW BLD-RTO: 14.4 % (ref 11.7–14.9)
PDW BLD-RTO: 14.9 % (ref 11.7–14.9)
PH BLOOD: 7.18 (ref 7.34–7.45)
PH BLOOD: 7.2 (ref 7.34–7.45)
PH BLOOD: 7.25 (ref 7.34–7.45)
PH BLOOD: 7.29 (ref 7.34–7.45)
PH BLOOD: 7.31 (ref 7.34–7.45)
PH BLOOD: 7.51 (ref 7.34–7.45)
PH VENOUS: 7.21 (ref 7.32–7.42)
PH VENOUS: 7.24 (ref 7.32–7.42)
PH VENOUS: 7.27 (ref 7.32–7.42)
PH VENOUS: 7.32 (ref 7.32–7.42)
PH, URINE: 6 (ref 5–8)
PHOSPHORUS: 6 MG/DL (ref 2.5–4.9)
PHOSPHORUS: 7.3 MG/DL (ref 2.5–4.9)
PHOSPHORUS: 7.8 MG/DL (ref 2.5–4.9)
PHOSPHORUS: 7.9 MG/DL (ref 2.5–4.9)
PLATELET # BLD: 146 K/CU MM (ref 140–440)
PLATELET # BLD: 178 K/CU MM (ref 140–440)
PLATELET # BLD: 185 K/CU MM (ref 140–440)
PLATELET # BLD: 227 K/CU MM (ref 140–440)
PMV BLD AUTO: 11.1 FL (ref 7.5–11.1)
PMV BLD AUTO: 11.1 FL (ref 7.5–11.1)
PMV BLD AUTO: 11.4 FL (ref 7.5–11.1)
PMV BLD AUTO: 11.8 FL (ref 7.5–11.1)
PO2 ARTERIAL: 103 MMHG (ref 75–100)
PO2 ARTERIAL: 161 MMHG (ref 75–100)
PO2 ARTERIAL: 196 MMHG (ref 75–100)
PO2 ARTERIAL: 205 MMHG (ref 75–100)
PO2 ARTERIAL: 89 MMHG (ref 75–100)
PO2 ARTERIAL: 90 MMHG (ref 75–100)
PO2, VEN: 143 MMHG (ref 28–48)
PO2, VEN: 21 MMHG (ref 28–48)
PO2, VEN: 51 MMHG (ref 28–48)
PO2, VEN: 63 MMHG (ref 28–48)
POTASSIUM SERPL-SCNC: 3 MMOL/L (ref 3.5–5.1)
POTASSIUM SERPL-SCNC: 3.5 MMOL/L (ref 3.5–5.1)
POTASSIUM SERPL-SCNC: 3.6 MMOL/L (ref 3.5–5.1)
POTASSIUM SERPL-SCNC: 3.6 MMOL/L (ref 3.5–5.1)
POTASSIUM SERPL-SCNC: 3.7 MMOL/L (ref 3.5–5.1)
POTASSIUM SERPL-SCNC: 3.7 MMOL/L (ref 3.5–5.1)
POTASSIUM SERPL-SCNC: 4 MMOL/L (ref 3.5–5.1)
POTASSIUM SERPL-SCNC: 4.3 MMOL/L (ref 3.5–5.1)
POTASSIUM SERPL-SCNC: 4.3 MMOL/L (ref 3.5–5.1)
POTASSIUM SERPL-SCNC: 4.6 MMOL/L (ref 3.5–5.1)
POTASSIUM SERPL-SCNC: 4.6 MMOL/L (ref 3.5–5.1)
PRO-BNP: 4675 PG/ML
PRO-BNP: 8271 PG/ML
PRO-BNP: 8993 PG/ML
PROCALCITONIN SERPL-MCNC: 0.17 NG/ML
PROCALCITONIN SERPL-MCNC: 1.27 NG/ML
PROCALCITONIN SERPL-MCNC: 1.3 NG/ML
PROCALCITONIN SERPL-MCNC: 1.31 NG/ML
PROCALCITONIN SERPL-MCNC: 3.23 NG/ML
PROTEIN UA: NEGATIVE MG/DL
PROTHROMBIN TIME: 44.7 SECONDS (ref 11.7–14.5)
PROTHROMBIN TIME: 51.8 SECONDS (ref 11.7–14.5)
PROTHROMBIN TIME: 52.4 SECONDS (ref 11.7–14.5)
PROTHROMBIN TIME: 52.9 SECONDS (ref 11.7–14.5)
PROTHROMBIN TIME: 53.1 SECONDS (ref 11.7–14.5)
PROTHROMBIN TIME: 56.2 SECONDS (ref 11.7–14.5)
RBC # BLD: 3.22 M/CU MM (ref 4.6–6.2)
RBC # BLD: 3.31 M/CU MM (ref 4.6–6.2)
RBC # BLD: 3.43 M/CU MM (ref 4.6–6.2)
RBC # BLD: 4.04 M/CU MM (ref 4.6–6.2)
RSV RNA NPH QL NAA+NON-PROBE: NOT DETECTED
RV+EV RNA NPH QL NAA+NON-PROBE: NOT DETECTED
SARS-COV-2 RNA NPH QL NAA+NON-PROBE: NOT DETECTED
SEGMENTED NEUTROPHILS ABSOLUTE COUNT: 11 K/CU MM
SEGMENTED NEUTROPHILS ABSOLUTE COUNT: 12.5 K/CU MM
SEGMENTED NEUTROPHILS ABSOLUTE COUNT: 13.4 K/CU MM
SEGMENTED NEUTROPHILS ABSOLUTE COUNT: 6.5 K/CU MM
SEGMENTED NEUTROPHILS RELATIVE PERCENT: 74 % (ref 36–66)
SEGMENTED NEUTROPHILS RELATIVE PERCENT: 83.3 % (ref 36–66)
SEGMENTED NEUTROPHILS RELATIVE PERCENT: 85.7 % (ref 36–66)
SEGMENTED NEUTROPHILS RELATIVE PERCENT: 89.6 % (ref 36–66)
SODIUM BLD-SCNC: 131 MMOL/L (ref 135–145)
SODIUM BLD-SCNC: 133 MMOL/L (ref 135–145)
SODIUM BLD-SCNC: 134 MMOL/L (ref 135–145)
SODIUM BLD-SCNC: 135 MMOL/L (ref 135–145)
SODIUM BLD-SCNC: 135 MMOL/L (ref 135–145)
SODIUM BLD-SCNC: 136 MMOL/L (ref 135–145)
SODIUM BLD-SCNC: 136 MMOL/L (ref 135–145)
SODIUM BLD-SCNC: 137 MMOL/L (ref 135–145)
SODIUM BLD-SCNC: 137 MMOL/L (ref 135–145)
SPECIFIC GRAVITY UA: <1.005 (ref 1–1.03)
SPECIMEN: ABNORMAL
SPECIMEN: NORMAL
T4 FREE SERPL-MCNC: 1.69 NG/DL (ref 0.9–1.8)
TOTAL IMMATURE NEUTOROPHIL: 0.02 K/CU MM
TOTAL IMMATURE NEUTOROPHIL: 0.07 K/CU MM
TOTAL IMMATURE NEUTOROPHIL: 0.09 K/CU MM
TOTAL IMMATURE NEUTOROPHIL: 0.12 K/CU MM
TOTAL NUCLEATED RBC: 0 K/CU MM
TOTAL PROTEIN: 5.1 GM/DL (ref 6.4–8.2)
TOTAL PROTEIN: 5.4 GM/DL (ref 6.4–8.2)
TOTAL PROTEIN: 5.6 GM/DL (ref 6.4–8.2)
TOTAL PROTEIN: 5.7 GM/DL (ref 6.4–8.2)
TOTAL PROTEIN: 7 GM/DL (ref 6.4–8.2)
TRIGL SERPL-MCNC: 54 MG/DL
TROPONIN T: 0.06 NG/ML
TROPONIN T: 0.07 NG/ML
TROPONIN T: <0.01 NG/ML
TSH SERPL DL<=0.005 MIU/L-ACNC: 0.91 UIU/ML (ref 0.27–4.2)
UROBILINOGEN, URINE: 0.2 MG/DL (ref 0.2–1)
VANCOMYCIN RANDOM: 52.4 UG/ML
VANCOMYCIN TROUGH: 7.5 UG/ML (ref 10–20)
WBC # BLD: 13.2 K/CU MM (ref 4–10.5)
WBC # BLD: 14.6 K/CU MM (ref 4–10.5)
WBC # BLD: 15 K/CU MM (ref 4–10.5)
WBC # BLD: 8.8 K/CU MM (ref 4–10.5)

## 2023-01-01 PROCEDURE — 6360000002 HC RX W HCPCS: Performed by: NURSE PRACTITIONER

## 2023-01-01 PROCEDURE — 6370000000 HC RX 637 (ALT 250 FOR IP): Performed by: STUDENT IN AN ORGANIZED HEALTH CARE EDUCATION/TRAINING PROGRAM

## 2023-01-01 PROCEDURE — 2580000003 HC RX 258: Performed by: STUDENT IN AN ORGANIZED HEALTH CARE EDUCATION/TRAINING PROGRAM

## 2023-01-01 PROCEDURE — 2000000000 HC ICU R&B

## 2023-01-01 PROCEDURE — 6370000000 HC RX 637 (ALT 250 FOR IP): Performed by: NURSE PRACTITIONER

## 2023-01-01 PROCEDURE — 94002 VENT MGMT INPAT INIT DAY: CPT

## 2023-01-01 PROCEDURE — 85610 PROTHROMBIN TIME: CPT

## 2023-01-01 PROCEDURE — 6360000002 HC RX W HCPCS: Performed by: STUDENT IN AN ORGANIZED HEALTH CARE EDUCATION/TRAINING PROGRAM

## 2023-01-01 PROCEDURE — 74018 RADEX ABDOMEN 1 VIEW: CPT

## 2023-01-01 PROCEDURE — 99285 EMERGENCY DEPT VISIT HI MDM: CPT

## 2023-01-01 PROCEDURE — 02HP32Z INSERTION OF MONITORING DEVICE INTO PULMONARY TRUNK, PERCUTANEOUS APPROACH: ICD-10-PCS | Performed by: STUDENT IN AN ORGANIZED HEALTH CARE EDUCATION/TRAINING PROGRAM

## 2023-01-01 PROCEDURE — 87040 BLOOD CULTURE FOR BACTERIA: CPT

## 2023-01-01 PROCEDURE — 99233 SBSQ HOSP IP/OBS HIGH 50: CPT | Performed by: INTERNAL MEDICINE

## 2023-01-01 PROCEDURE — 83880 ASSAY OF NATRIURETIC PEPTIDE: CPT

## 2023-01-01 PROCEDURE — 86140 C-REACTIVE PROTEIN: CPT

## 2023-01-01 PROCEDURE — 83735 ASSAY OF MAGNESIUM: CPT

## 2023-01-01 PROCEDURE — 2700000000 HC OXYGEN THERAPY PER DAY

## 2023-01-01 PROCEDURE — 82803 BLOOD GASES ANY COMBINATION: CPT

## 2023-01-01 PROCEDURE — 2580000003 HC RX 258: Performed by: INTERNAL MEDICINE

## 2023-01-01 PROCEDURE — 6360000002 HC RX W HCPCS

## 2023-01-01 PROCEDURE — 36415 COLL VENOUS BLD VENIPUNCTURE: CPT

## 2023-01-01 PROCEDURE — 84145 PROCALCITONIN (PCT): CPT

## 2023-01-01 PROCEDURE — 71045 X-RAY EXAM CHEST 1 VIEW: CPT

## 2023-01-01 PROCEDURE — 03HY32Z INSERTION OF MONITORING DEVICE INTO UPPER ARTERY, PERCUTANEOUS APPROACH: ICD-10-PCS | Performed by: STUDENT IN AN ORGANIZED HEALTH CARE EDUCATION/TRAINING PROGRAM

## 2023-01-01 PROCEDURE — 84484 ASSAY OF TROPONIN QUANT: CPT

## 2023-01-01 PROCEDURE — 6360000002 HC RX W HCPCS: Performed by: INTERNAL MEDICINE

## 2023-01-01 PROCEDURE — 93010 ELECTROCARDIOGRAM REPORT: CPT | Performed by: INTERNAL MEDICINE

## 2023-01-01 PROCEDURE — 84443 ASSAY THYROID STIM HORMONE: CPT

## 2023-01-01 PROCEDURE — 96367 TX/PROPH/DG ADDL SEQ IV INF: CPT

## 2023-01-01 PROCEDURE — 81003 URINALYSIS AUTO W/O SCOPE: CPT

## 2023-01-01 PROCEDURE — 2500000003 HC RX 250 WO HCPCS: Performed by: INTERNAL MEDICINE

## 2023-01-01 PROCEDURE — 96375 TX/PRO/DX INJ NEW DRUG ADDON: CPT

## 2023-01-01 PROCEDURE — 6370000000 HC RX 637 (ALT 250 FOR IP)

## 2023-01-01 PROCEDURE — 2500000003 HC RX 250 WO HCPCS

## 2023-01-01 PROCEDURE — 2500000003 HC RX 250 WO HCPCS: Performed by: EMERGENCY MEDICINE

## 2023-01-01 PROCEDURE — 32551 INSERTION OF CHEST TUBE: CPT

## 2023-01-01 PROCEDURE — 2580000003 HC RX 258: Performed by: NURSE PRACTITIONER

## 2023-01-01 PROCEDURE — 6370000000 HC RX 637 (ALT 250 FOR IP): Performed by: EMERGENCY MEDICINE

## 2023-01-01 PROCEDURE — 2500000003 HC RX 250 WO HCPCS: Performed by: STUDENT IN AN ORGANIZED HEALTH CARE EDUCATION/TRAINING PROGRAM

## 2023-01-01 PROCEDURE — 5A1935Z RESPIRATORY VENTILATION, LESS THAN 24 CONSECUTIVE HOURS: ICD-10-PCS | Performed by: STUDENT IN AN ORGANIZED HEALTH CARE EDUCATION/TRAINING PROGRAM

## 2023-01-01 PROCEDURE — 83605 ASSAY OF LACTIC ACID: CPT

## 2023-01-01 PROCEDURE — 37799 UNLISTED PX VASCULAR SURGERY: CPT

## 2023-01-01 PROCEDURE — 85025 COMPLETE CBC W/AUTO DIFF WBC: CPT

## 2023-01-01 PROCEDURE — 85730 THROMBOPLASTIN TIME PARTIAL: CPT

## 2023-01-01 PROCEDURE — 94761 N-INVAS EAR/PLS OXIMETRY MLT: CPT

## 2023-01-01 PROCEDURE — 87086 URINE CULTURE/COLONY COUNT: CPT

## 2023-01-01 PROCEDURE — 0BH17EZ INSERTION OF ENDOTRACHEAL AIRWAY INTO TRACHEA, VIA NATURAL OR ARTIFICIAL OPENING: ICD-10-PCS | Performed by: STUDENT IN AN ORGANIZED HEALTH CARE EDUCATION/TRAINING PROGRAM

## 2023-01-01 PROCEDURE — 93005 ELECTROCARDIOGRAM TRACING: CPT | Performed by: STUDENT IN AN ORGANIZED HEALTH CARE EDUCATION/TRAINING PROGRAM

## 2023-01-01 PROCEDURE — 02HV33Z INSERTION OF INFUSION DEVICE INTO SUPERIOR VENA CAVA, PERCUTANEOUS APPROACH: ICD-10-PCS | Performed by: SURGERY

## 2023-01-01 PROCEDURE — 89220 SPUTUM SPECIMEN COLLECTION: CPT

## 2023-01-01 PROCEDURE — 94762 N-INVAS EAR/PLS OXIMTRY CONT: CPT

## 2023-01-01 PROCEDURE — APPNB60 APP NON BILLABLE TIME 46-60 MINS: Performed by: NURSE PRACTITIONER

## 2023-01-01 PROCEDURE — 99231 SBSQ HOSP IP/OBS SF/LOW 25: CPT | Performed by: NURSE PRACTITIONER

## 2023-01-01 PROCEDURE — 80053 COMPREHEN METABOLIC PANEL: CPT

## 2023-01-01 PROCEDURE — 80048 BASIC METABOLIC PNL TOTAL CA: CPT

## 2023-01-01 PROCEDURE — 99223 1ST HOSP IP/OBS HIGH 75: CPT | Performed by: INTERNAL MEDICINE

## 2023-01-01 PROCEDURE — 93503 INSERT/PLACE HEART CATHETER: CPT

## 2023-01-01 PROCEDURE — 0W9930Z DRAINAGE OF RIGHT PLEURAL CAVITY WITH DRAINAGE DEVICE, PERCUTANEOUS APPROACH: ICD-10-PCS | Performed by: SURGERY

## 2023-01-01 PROCEDURE — 2140000000 HC CCU INTERMEDIATE R&B

## 2023-01-01 PROCEDURE — 31500 INSERT EMERGENCY AIRWAY: CPT

## 2023-01-01 PROCEDURE — 82805 BLOOD GASES W/O2 SATURATION: CPT

## 2023-01-01 PROCEDURE — 2580000003 HC RX 258

## 2023-01-01 PROCEDURE — 36620 INSERTION CATHETER ARTERY: CPT

## 2023-01-01 PROCEDURE — 99291 CRITICAL CARE FIRST HOUR: CPT | Performed by: INTERNAL MEDICINE

## 2023-01-01 PROCEDURE — 94640 AIRWAY INHALATION TREATMENT: CPT

## 2023-01-01 PROCEDURE — 93005 ELECTROCARDIOGRAM TRACING: CPT | Performed by: EMERGENCY MEDICINE

## 2023-01-01 PROCEDURE — 80061 LIPID PANEL: CPT

## 2023-01-01 PROCEDURE — 51702 INSERT TEMP BLADDER CATH: CPT

## 2023-01-01 PROCEDURE — 80202 ASSAY OF VANCOMYCIN: CPT

## 2023-01-01 PROCEDURE — 6360000002 HC RX W HCPCS: Performed by: EMERGENCY MEDICINE

## 2023-01-01 PROCEDURE — P9047 ALBUMIN (HUMAN), 25%, 50ML: HCPCS | Performed by: INTERNAL MEDICINE

## 2023-01-01 PROCEDURE — 87081 CULTURE SCREEN ONLY: CPT

## 2023-01-01 PROCEDURE — 36556 INSERT NON-TUNNEL CV CATH: CPT | Performed by: NURSE PRACTITIONER

## 2023-01-01 PROCEDURE — P9045 ALBUMIN (HUMAN), 5%, 250 ML: HCPCS

## 2023-01-01 PROCEDURE — 71046 X-RAY EXAM CHEST 2 VIEWS: CPT

## 2023-01-01 PROCEDURE — 82248 BILIRUBIN DIRECT: CPT

## 2023-01-01 PROCEDURE — 6360000004 HC RX CONTRAST MEDICATION

## 2023-01-01 PROCEDURE — 84100 ASSAY OF PHOSPHORUS: CPT

## 2023-01-01 PROCEDURE — 96365 THER/PROPH/DIAG IV INF INIT: CPT

## 2023-01-01 PROCEDURE — 84439 ASSAY OF FREE THYROXINE: CPT

## 2023-01-01 PROCEDURE — 94660 CPAP INITIATION&MGMT: CPT

## 2023-01-01 PROCEDURE — 36556 INSERT NON-TUNNEL CV CATH: CPT

## 2023-01-01 PROCEDURE — C8929 TTE W OR WO FOL WCON,DOPPLER: HCPCS

## 2023-01-01 PROCEDURE — APPNB180 APP NON BILLABLE TIME > 60 MINS: Performed by: NURSE PRACTITIONER

## 2023-01-01 PROCEDURE — 0202U NFCT DS 22 TRGT SARS-COV-2: CPT

## 2023-01-01 PROCEDURE — 2580000003 HC RX 258: Performed by: EMERGENCY MEDICINE

## 2023-01-01 PROCEDURE — 36600 WITHDRAWAL OF ARTERIAL BLOOD: CPT

## 2023-01-01 PROCEDURE — 71250 CT THORAX DX C-: CPT

## 2023-01-01 RX ORDER — POLYETHYLENE GLYCOL 3350 17 G/17G
17 POWDER, FOR SOLUTION ORAL DAILY PRN
Status: DISCONTINUED | OUTPATIENT
Start: 2023-01-01 | End: 2023-01-01

## 2023-01-01 RX ORDER — LIDOCAINE HYDROCHLORIDE ANHYDROUS AND DEXTROSE MONOHYDRATE 5; 400 G/100ML; MG/100ML
1 INJECTION, SOLUTION INTRAVENOUS CONTINUOUS
Status: DISCONTINUED | OUTPATIENT
Start: 2023-01-01 | End: 2023-01-01

## 2023-01-01 RX ORDER — DOBUTAMINE HYDROCHLORIDE 200 MG/100ML
2.5-1 INJECTION INTRAVENOUS CONTINUOUS
Status: DISCONTINUED | OUTPATIENT
Start: 2023-01-01 | End: 2023-01-01

## 2023-01-01 RX ORDER — CALCIUM CARBONATE 200(500)MG
500 TABLET,CHEWABLE ORAL 3 TIMES DAILY PRN
Status: DISCONTINUED | OUTPATIENT
Start: 2023-01-01 | End: 2023-01-01

## 2023-01-01 RX ORDER — NOREPINEPHRINE BIT/0.9 % NACL 16MG/250ML
1-100 INFUSION BOTTLE (ML) INTRAVENOUS CONTINUOUS
Status: DISCONTINUED | OUTPATIENT
Start: 2023-01-01 | End: 2023-01-01

## 2023-01-01 RX ORDER — ALBUTEROL SULFATE 2.5 MG/3ML
2.5 SOLUTION RESPIRATORY (INHALATION)
Status: DISCONTINUED | OUTPATIENT
Start: 2023-01-01 | End: 2023-01-01

## 2023-01-01 RX ORDER — MIDAZOLAM HYDROCHLORIDE 1 MG/ML
2 INJECTION INTRAMUSCULAR; INTRAVENOUS
Status: DISCONTINUED | OUTPATIENT
Start: 2023-01-01 | End: 2023-01-01

## 2023-01-01 RX ORDER — MORPHINE SULFATE 2 MG/ML
2 INJECTION, SOLUTION INTRAMUSCULAR; INTRAVENOUS
Status: DISCONTINUED | OUTPATIENT
Start: 2023-01-01 | End: 2023-01-01

## 2023-01-01 RX ORDER — SODIUM CHLORIDE 0.9 % (FLUSH) 0.9 %
5-40 SYRINGE (ML) INJECTION PRN
Status: DISCONTINUED | OUTPATIENT
Start: 2023-01-01 | End: 2023-01-01

## 2023-01-01 RX ORDER — ALBUMIN, HUMAN INJ 5% 5 %
SOLUTION INTRAVENOUS
Status: COMPLETED
Start: 2023-01-01 | End: 2023-01-01

## 2023-01-01 RX ORDER — IPRATROPIUM BROMIDE AND ALBUTEROL SULFATE 2.5; .5 MG/3ML; MG/3ML
1 SOLUTION RESPIRATORY (INHALATION) ONCE
Status: COMPLETED | OUTPATIENT
Start: 2023-01-01 | End: 2023-01-01

## 2023-01-01 RX ORDER — LEVOTHYROXINE SODIUM 0.03 MG/1
75 TABLET ORAL DAILY
Status: DISCONTINUED | OUTPATIENT
Start: 2023-01-01 | End: 2023-01-01

## 2023-01-01 RX ORDER — ATORVASTATIN CALCIUM 40 MG/1
80 TABLET, FILM COATED ORAL DAILY
Status: DISCONTINUED | OUTPATIENT
Start: 2023-01-01 | End: 2023-01-01

## 2023-01-01 RX ORDER — MORPHINE SULFATE 2 MG/ML
0.5 INJECTION, SOLUTION INTRAMUSCULAR; INTRAVENOUS ONCE
Status: DISCONTINUED | OUTPATIENT
Start: 2023-01-01 | End: 2023-01-01

## 2023-01-01 RX ORDER — FENTANYL CITRATE 50 UG/ML
25 INJECTION, SOLUTION INTRAMUSCULAR; INTRAVENOUS
Status: DISCONTINUED | OUTPATIENT
Start: 2023-01-01 | End: 2023-01-01

## 2023-01-01 RX ORDER — SODIUM CHLORIDE 9 MG/ML
INJECTION, SOLUTION INTRAVENOUS PRN
Status: DISCONTINUED | OUTPATIENT
Start: 2023-01-01 | End: 2023-02-07 | Stop reason: HOSPADM

## 2023-01-01 RX ORDER — ASPIRIN 81 MG/1
81 TABLET, CHEWABLE ORAL DAILY
Status: DISCONTINUED | OUTPATIENT
Start: 2023-01-01 | End: 2023-01-01

## 2023-01-01 RX ORDER — HYDROXYZINE PAMOATE 25 MG/1
50 CAPSULE ORAL ONCE
Status: COMPLETED | OUTPATIENT
Start: 2023-01-01 | End: 2023-01-01

## 2023-01-01 RX ORDER — PROPOFOL 10 MG/ML
5-50 INJECTION, EMULSION INTRAVENOUS CONTINUOUS
Status: DISCONTINUED | OUTPATIENT
Start: 2023-01-01 | End: 2023-01-01

## 2023-01-01 RX ORDER — DEXAMETHASONE SODIUM PHOSPHATE 4 MG/ML
4 INJECTION, SOLUTION INTRA-ARTICULAR; INTRALESIONAL; INTRAMUSCULAR; INTRAVENOUS; SOFT TISSUE EVERY 6 HOURS
Status: DISCONTINUED | OUTPATIENT
Start: 2023-01-01 | End: 2023-01-01

## 2023-01-01 RX ORDER — ALBUMIN (HUMAN) 12.5 G/50ML
25 SOLUTION INTRAVENOUS ONCE
Status: COMPLETED | OUTPATIENT
Start: 2023-01-01 | End: 2023-01-01

## 2023-01-01 RX ORDER — POTASSIUM CHLORIDE 20 MEQ/1
40 TABLET, EXTENDED RELEASE ORAL PRN
Status: DISCONTINUED | OUTPATIENT
Start: 2023-01-01 | End: 2023-01-01

## 2023-01-01 RX ORDER — TRAMADOL HYDROCHLORIDE 50 MG/1
25 TABLET ORAL ONCE
Status: COMPLETED | OUTPATIENT
Start: 2023-01-01 | End: 2023-01-01

## 2023-01-01 RX ORDER — SODIUM CHLORIDE FOR INHALATION 3 %
4 VIAL, NEBULIZER (ML) INHALATION EVERY 6 HOURS
Status: DISCONTINUED | OUTPATIENT
Start: 2023-01-01 | End: 2023-01-01

## 2023-01-01 RX ORDER — MORPHINE SULFATE 2 MG/ML
2 INJECTION, SOLUTION INTRAMUSCULAR; INTRAVENOUS EVERY 10 MIN PRN
Status: DISCONTINUED | OUTPATIENT
Start: 2023-01-01 | End: 2023-02-07 | Stop reason: HOSPADM

## 2023-01-01 RX ORDER — MORPHINE SULFATE 4 MG/ML
4 INJECTION, SOLUTION INTRAMUSCULAR; INTRAVENOUS
Status: DISCONTINUED | OUTPATIENT
Start: 2023-01-01 | End: 2023-01-01

## 2023-01-01 RX ORDER — SODIUM CHLORIDE 9 MG/ML
INJECTION, SOLUTION INTRAVENOUS CONTINUOUS
Status: DISCONTINUED | OUTPATIENT
Start: 2023-01-01 | End: 2023-01-01

## 2023-01-01 RX ORDER — 0.9 % SODIUM CHLORIDE 0.9 %
1000 INTRAVENOUS SOLUTION INTRAVENOUS ONCE
Status: COMPLETED | OUTPATIENT
Start: 2023-01-01 | End: 2023-01-01

## 2023-01-01 RX ORDER — DOBUTAMINE HYDROCHLORIDE 200 MG/100ML
10 INJECTION INTRAVENOUS CONTINUOUS
Status: DISCONTINUED | OUTPATIENT
Start: 2023-01-01 | End: 2023-01-01

## 2023-01-01 RX ORDER — ALBUTEROL SULFATE 2.5 MG/.5ML
2.5 SOLUTION RESPIRATORY (INHALATION) EVERY 6 HOURS
Status: DISCONTINUED | OUTPATIENT
Start: 2023-01-01 | End: 2023-01-01

## 2023-01-01 RX ORDER — IPRATROPIUM BROMIDE AND ALBUTEROL SULFATE 2.5; .5 MG/3ML; MG/3ML
1 SOLUTION RESPIRATORY (INHALATION) 4 TIMES DAILY
Status: DISCONTINUED | OUTPATIENT
Start: 2023-01-01 | End: 2023-01-01

## 2023-01-01 RX ORDER — METHYLPREDNISOLONE SODIUM SUCCINATE 125 MG/2ML
125 INJECTION, POWDER, LYOPHILIZED, FOR SOLUTION INTRAMUSCULAR; INTRAVENOUS ONCE
Status: COMPLETED | OUTPATIENT
Start: 2023-01-01 | End: 2023-01-01

## 2023-01-01 RX ORDER — 0.9 % SODIUM CHLORIDE 0.9 %
500 INTRAVENOUS SOLUTION INTRAVENOUS ONCE
Status: COMPLETED | OUTPATIENT
Start: 2023-01-01 | End: 2023-01-01

## 2023-01-01 RX ORDER — IPRATROPIUM BROMIDE AND ALBUTEROL SULFATE 2.5; .5 MG/3ML; MG/3ML
1 SOLUTION RESPIRATORY (INHALATION) EVERY 6 HOURS
Status: DISCONTINUED | OUTPATIENT
Start: 2023-01-01 | End: 2023-01-01

## 2023-01-01 RX ORDER — PROPOFOL 10 MG/ML
INJECTION, EMULSION INTRAVENOUS
Status: COMPLETED
Start: 2023-01-01 | End: 2023-01-01

## 2023-01-01 RX ORDER — POTASSIUM CHLORIDE 7.45 MG/ML
10 INJECTION INTRAVENOUS ONCE
Status: COMPLETED | OUTPATIENT
Start: 2023-01-01 | End: 2023-01-01

## 2023-01-01 RX ORDER — METHYLPREDNISOLONE SODIUM SUCCINATE 40 MG/ML
40 INJECTION, POWDER, LYOPHILIZED, FOR SOLUTION INTRAMUSCULAR; INTRAVENOUS EVERY 12 HOURS
Status: DISCONTINUED | OUTPATIENT
Start: 2023-01-01 | End: 2023-01-01

## 2023-01-01 RX ORDER — MORPHINE SULFATE 4 MG/ML
4 INJECTION, SOLUTION INTRAMUSCULAR; INTRAVENOUS EVERY 10 MIN PRN
Status: DISCONTINUED | OUTPATIENT
Start: 2023-01-01 | End: 2023-02-07 | Stop reason: HOSPADM

## 2023-01-01 RX ORDER — FUROSEMIDE 10 MG/ML
40 INJECTION INTRAMUSCULAR; INTRAVENOUS ONCE
Status: COMPLETED | OUTPATIENT
Start: 2023-01-01 | End: 2023-01-01

## 2023-01-01 RX ORDER — ONDANSETRON 4 MG/1
4 TABLET, ORALLY DISINTEGRATING ORAL EVERY 8 HOURS PRN
Status: DISCONTINUED | OUTPATIENT
Start: 2023-01-01 | End: 2023-01-01

## 2023-01-01 RX ORDER — ALBUMIN, HUMAN INJ 5% 5 %
SOLUTION INTRAVENOUS
Status: DISPENSED
Start: 2023-01-01 | End: 2023-01-01

## 2023-01-01 RX ORDER — DOBUTAMINE HYDROCHLORIDE 200 MG/100ML
5 INJECTION INTRAVENOUS CONTINUOUS
Status: DISCONTINUED | OUTPATIENT
Start: 2023-01-01 | End: 2023-01-01

## 2023-01-01 RX ORDER — AMIODARONE HYDROCHLORIDE 200 MG/1
200 TABLET ORAL DAILY
Status: DISCONTINUED | OUTPATIENT
Start: 2023-01-01 | End: 2023-01-01

## 2023-01-01 RX ORDER — FUROSEMIDE 10 MG/ML
40 INJECTION INTRAMUSCULAR; INTRAVENOUS 2 TIMES DAILY
Status: DISCONTINUED | OUTPATIENT
Start: 2023-01-01 | End: 2023-01-01

## 2023-01-01 RX ORDER — MAGNESIUM SULFATE IN WATER 40 MG/ML
2000 INJECTION, SOLUTION INTRAVENOUS PRN
Status: DISCONTINUED | OUTPATIENT
Start: 2023-01-01 | End: 2023-01-01

## 2023-01-01 RX ORDER — ALBUMIN (HUMAN) 12.5 G/50ML
SOLUTION INTRAVENOUS
Status: DISCONTINUED
Start: 2023-01-01 | End: 2023-01-01 | Stop reason: WASHOUT

## 2023-01-01 RX ORDER — GLYCOPYRROLATE 0.2 MG/ML
0.2 INJECTION INTRAMUSCULAR; INTRAVENOUS EVERY 4 HOURS PRN
Status: DISCONTINUED | OUTPATIENT
Start: 2023-01-01 | End: 2023-02-07 | Stop reason: HOSPADM

## 2023-01-01 RX ORDER — FENTANYL CITRATE 50 UG/ML
50 INJECTION, SOLUTION INTRAMUSCULAR; INTRAVENOUS
Status: DISCONTINUED | OUTPATIENT
Start: 2023-01-01 | End: 2023-01-01

## 2023-01-01 RX ORDER — POTASSIUM CHLORIDE 7.45 MG/ML
10 INJECTION INTRAVENOUS PRN
Status: DISCONTINUED | OUTPATIENT
Start: 2023-01-01 | End: 2023-01-01

## 2023-01-01 RX ORDER — MIDODRINE HYDROCHLORIDE 5 MG/1
10 TABLET ORAL
Status: DISCONTINUED | OUTPATIENT
Start: 2023-01-01 | End: 2023-01-01

## 2023-01-01 RX ORDER — MIDODRINE HYDROCHLORIDE 5 MG/1
5 TABLET ORAL ONCE
Status: COMPLETED | OUTPATIENT
Start: 2023-01-01 | End: 2023-01-01

## 2023-01-01 RX ORDER — MILRINONE LACTATE 0.2 MG/ML
.0625-.75 INJECTION, SOLUTION INTRAVENOUS CONTINUOUS
Status: DISCONTINUED | OUTPATIENT
Start: 2023-01-01 | End: 2023-01-01

## 2023-01-01 RX ORDER — ACETAMINOPHEN 650 MG/1
650 SUPPOSITORY RECTAL EVERY 6 HOURS PRN
Status: DISCONTINUED | OUTPATIENT
Start: 2023-01-01 | End: 2023-01-01

## 2023-01-01 RX ORDER — ALBUMIN, HUMAN INJ 5% 5 %
25 SOLUTION INTRAVENOUS ONCE
Status: COMPLETED | OUTPATIENT
Start: 2023-01-01 | End: 2023-01-01

## 2023-01-01 RX ORDER — MIDAZOLAM HYDROCHLORIDE 1 MG/ML
INJECTION INTRAMUSCULAR; INTRAVENOUS
Status: COMPLETED
Start: 2023-01-01 | End: 2023-01-01

## 2023-01-01 RX ORDER — MORPHINE SULFATE 4 MG/ML
10 INJECTION, SOLUTION INTRAMUSCULAR; INTRAVENOUS ONCE
Status: COMPLETED | OUTPATIENT
Start: 2023-01-01 | End: 2023-01-01

## 2023-01-01 RX ORDER — MIDAZOLAM HYDROCHLORIDE 1 MG/ML
0.5 INJECTION INTRAMUSCULAR; INTRAVENOUS
Status: DISCONTINUED | OUTPATIENT
Start: 2023-01-01 | End: 2023-02-07 | Stop reason: HOSPADM

## 2023-01-01 RX ORDER — POTASSIUM CHLORIDE 20 MEQ/1
40 TABLET, EXTENDED RELEASE ORAL ONCE
Status: COMPLETED | OUTPATIENT
Start: 2023-01-01 | End: 2023-01-01

## 2023-01-01 RX ORDER — METHYLPREDNISOLONE SODIUM SUCCINATE 40 MG/ML
40 INJECTION, POWDER, LYOPHILIZED, FOR SOLUTION INTRAMUSCULAR; INTRAVENOUS ONCE
Status: COMPLETED | OUTPATIENT
Start: 2023-01-01 | End: 2023-01-01

## 2023-01-01 RX ORDER — ONDANSETRON 2 MG/ML
4 INJECTION INTRAMUSCULAR; INTRAVENOUS EVERY 6 HOURS PRN
Status: DISCONTINUED | OUTPATIENT
Start: 2023-01-01 | End: 2023-01-01

## 2023-01-01 RX ORDER — SODIUM CHLORIDE 0.9 % (FLUSH) 0.9 %
5-40 SYRINGE (ML) INJECTION EVERY 12 HOURS SCHEDULED
Status: DISCONTINUED | OUTPATIENT
Start: 2023-01-01 | End: 2023-01-01

## 2023-01-01 RX ORDER — DEXMEDETOMIDINE HYDROCHLORIDE 4 UG/ML
.1-1.5 INJECTION, SOLUTION INTRAVENOUS CONTINUOUS
Status: DISCONTINUED | OUTPATIENT
Start: 2023-01-01 | End: 2023-01-01

## 2023-01-01 RX ORDER — METRONIDAZOLE 500 MG/100ML
500 INJECTION, SOLUTION INTRAVENOUS ONCE
Status: COMPLETED | OUTPATIENT
Start: 2023-01-01 | End: 2023-01-01

## 2023-01-01 RX ORDER — MIDODRINE HYDROCHLORIDE 5 MG/1
5 TABLET ORAL ONCE
Status: DISCONTINUED | OUTPATIENT
Start: 2023-01-01 | End: 2023-01-01

## 2023-01-01 RX ORDER — ALBUMIN (HUMAN) 12.5 G/50ML
25 SOLUTION INTRAVENOUS ONCE
Status: DISCONTINUED | OUTPATIENT
Start: 2023-01-01 | End: 2023-01-01

## 2023-01-01 RX ORDER — LORAZEPAM 2 MG/ML
2 INJECTION INTRAMUSCULAR ONCE
Status: COMPLETED | OUTPATIENT
Start: 2023-01-01 | End: 2023-01-01

## 2023-01-01 RX ORDER — METOPROLOL SUCCINATE 25 MG/1
25 TABLET, EXTENDED RELEASE ORAL DAILY
Status: DISCONTINUED | OUTPATIENT
Start: 2023-01-01 | End: 2023-01-01

## 2023-01-01 RX ORDER — ACETAMINOPHEN 325 MG/1
650 TABLET ORAL EVERY 6 HOURS PRN
Status: DISCONTINUED | OUTPATIENT
Start: 2023-01-01 | End: 2023-01-01

## 2023-01-01 RX ORDER — MORPHINE SULFATE 2 MG/ML
1 INJECTION, SOLUTION INTRAMUSCULAR; INTRAVENOUS EVERY 4 HOURS PRN
Status: DISCONTINUED | OUTPATIENT
Start: 2023-01-01 | End: 2023-01-01

## 2023-01-01 RX ORDER — MORPHINE SULFATE 2 MG/ML
1 INJECTION, SOLUTION INTRAMUSCULAR; INTRAVENOUS ONCE
Status: COMPLETED | OUTPATIENT
Start: 2023-01-01 | End: 2023-01-01

## 2023-01-01 RX ADMIN — CEFEPIME HYDROCHLORIDE 2000 MG: 2 INJECTION, POWDER, FOR SOLUTION INTRAVENOUS at 10:53

## 2023-01-01 RX ADMIN — CEFEPIME HYDROCHLORIDE 2000 MG: 2 INJECTION, POWDER, FOR SOLUTION INTRAVENOUS at 01:38

## 2023-01-01 RX ADMIN — ALBUTEROL SULFATE 2.5 MG: 2.5 SOLUTION RESPIRATORY (INHALATION) at 15:33

## 2023-01-01 RX ADMIN — SODIUM CHLORIDE, PRESERVATIVE FREE 10 ML: 5 INJECTION INTRAVENOUS at 08:47

## 2023-01-01 RX ADMIN — SODIUM CHLORIDE, PRESERVATIVE FREE 10 ML: 5 INJECTION INTRAVENOUS at 09:11

## 2023-01-01 RX ADMIN — BUMETANIDE 2 MG/HR: 0.25 INJECTION, SOLUTION INTRAMUSCULAR; INTRAVENOUS at 20:16

## 2023-01-01 RX ADMIN — ASPIRIN 81 MG CHEWABLE TABLET 81 MG: 81 TABLET CHEWABLE at 09:08

## 2023-01-01 RX ADMIN — AMIODARONE HYDROCHLORIDE 200 MG: 200 TABLET ORAL at 08:47

## 2023-01-01 RX ADMIN — MORPHINE SULFATE 4 MG: 4 INJECTION, SOLUTION INTRAMUSCULAR; INTRAVENOUS at 23:26

## 2023-01-01 RX ADMIN — LEVOTHYROXINE SODIUM 75 MCG: 25 TABLET ORAL at 05:48

## 2023-01-01 RX ADMIN — TRAMADOL HYDROCHLORIDE 25 MG: 50 TABLET, COATED ORAL at 10:07

## 2023-01-01 RX ADMIN — ACETAMINOPHEN 650 MG: 325 TABLET ORAL at 16:26

## 2023-01-01 RX ADMIN — SACUBITRIL AND VALSARTAN 1 TABLET: 24; 26 TABLET, FILM COATED ORAL at 21:20

## 2023-01-01 RX ADMIN — FENTANYL CITRATE 25 MCG: 50 INJECTION, SOLUTION INTRAMUSCULAR; INTRAVENOUS at 15:41

## 2023-01-01 RX ADMIN — MORPHINE SULFATE 10 MG: 4 INJECTION, SOLUTION INTRAMUSCULAR; INTRAVENOUS at 22:46

## 2023-01-01 RX ADMIN — DOBUTAMINE IN DEXTROSE 10 MCG/KG/MIN: 200 INJECTION, SOLUTION INTRAVENOUS at 19:31

## 2023-01-01 RX ADMIN — ALBUMIN (HUMAN) 25 G: 0.25 INJECTION, SOLUTION INTRAVENOUS at 01:39

## 2023-01-01 RX ADMIN — FENTANYL CITRATE 50 MCG: 50 INJECTION, SOLUTION INTRAMUSCULAR; INTRAVENOUS at 03:06

## 2023-01-01 RX ADMIN — BUMETANIDE 2 MG/HR: 0.25 INJECTION, SOLUTION INTRAMUSCULAR; INTRAVENOUS at 06:49

## 2023-01-01 RX ADMIN — METRONIDAZOLE 500 MG: 500 INJECTION, SOLUTION INTRAVENOUS at 02:23

## 2023-01-01 RX ADMIN — Medication 35 MCG/MIN: at 03:02

## 2023-01-01 RX ADMIN — HYDROXYZINE PAMOATE 50 MG: 25 CAPSULE ORAL at 01:40

## 2023-01-01 RX ADMIN — FUROSEMIDE 40 MG: 10 INJECTION, SOLUTION INTRAMUSCULAR; INTRAVENOUS at 11:50

## 2023-01-01 RX ADMIN — MORPHINE SULFATE 1 MG: 2 INJECTION, SOLUTION INTRAMUSCULAR; INTRAVENOUS at 18:51

## 2023-01-01 RX ADMIN — DEXAMETHASONE SODIUM PHOSPHATE 4 MG: 4 INJECTION, SOLUTION INTRAMUSCULAR; INTRAVENOUS at 16:25

## 2023-01-01 RX ADMIN — MIDODRINE HYDROCHLORIDE 10 MG: 5 TABLET ORAL at 09:50

## 2023-01-01 RX ADMIN — MIDODRINE HYDROCHLORIDE 10 MG: 5 TABLET ORAL at 03:42

## 2023-01-01 RX ADMIN — Medication 4 ML: at 03:59

## 2023-01-01 RX ADMIN — DOXYCYCLINE 100 MG: 100 INJECTION, POWDER, LYOPHILIZED, FOR SOLUTION INTRAVENOUS at 10:51

## 2023-01-01 RX ADMIN — ATORVASTATIN CALCIUM 80 MG: 40 TABLET, FILM COATED ORAL at 09:08

## 2023-01-01 RX ADMIN — AMIODARONE HYDROCHLORIDE 200 MG: 200 TABLET ORAL at 11:50

## 2023-01-01 RX ADMIN — ACETAMINOPHEN 650 MG: 325 TABLET ORAL at 10:59

## 2023-01-01 RX ADMIN — SODIUM CHLORIDE, PRESERVATIVE FREE 10 ML: 5 INJECTION INTRAVENOUS at 23:06

## 2023-01-01 RX ADMIN — MIDODRINE HYDROCHLORIDE 5 MG: 5 TABLET ORAL at 10:21

## 2023-01-01 RX ADMIN — FUROSEMIDE 40 MG: 10 INJECTION, SOLUTION INTRAVENOUS at 00:52

## 2023-01-01 RX ADMIN — DOBUTAMINE IN DEXTROSE 5 MCG/KG/MIN: 200 INJECTION, SOLUTION INTRAVENOUS at 17:04

## 2023-01-01 RX ADMIN — CEFEPIME HYDROCHLORIDE 2000 MG: 2 INJECTION, POWDER, FOR SOLUTION INTRAVENOUS at 08:46

## 2023-01-01 RX ADMIN — EPINEPHRINE 10 MCG/MIN: 1 INJECTION INTRAMUSCULAR; INTRAVENOUS; SUBCUTANEOUS at 07:43

## 2023-01-01 RX ADMIN — POTASSIUM BICARBONATE 20 MEQ: 782 TABLET, EFFERVESCENT ORAL at 21:20

## 2023-01-01 RX ADMIN — TRAMADOL HYDROCHLORIDE 25 MG: 50 TABLET, COATED ORAL at 11:59

## 2023-01-01 RX ADMIN — VANCOMYCIN HYDROCHLORIDE 1500 MG: 10 INJECTION, POWDER, LYOPHILIZED, FOR SOLUTION INTRAVENOUS at 03:34

## 2023-01-01 RX ADMIN — DEXAMETHASONE SODIUM PHOSPHATE 4 MG: 4 INJECTION, SOLUTION INTRAMUSCULAR; INTRAVENOUS at 05:30

## 2023-01-01 RX ADMIN — POTASSIUM CHLORIDE 40 MEQ: 1500 TABLET, EXTENDED RELEASE ORAL at 06:26

## 2023-01-01 RX ADMIN — LEVOTHYROXINE SODIUM 75 MCG: 25 TABLET ORAL at 05:27

## 2023-01-01 RX ADMIN — SODIUM CHLORIDE, PRESERVATIVE FREE 10 ML: 5 INJECTION INTRAVENOUS at 10:48

## 2023-01-01 RX ADMIN — IPRATROPIUM BROMIDE AND ALBUTEROL SULFATE 1 AMPULE: 2.5; .5 SOLUTION RESPIRATORY (INHALATION) at 03:24

## 2023-01-01 RX ADMIN — METHYLPREDNISOLONE SODIUM SUCCINATE 40 MG: 40 INJECTION, POWDER, FOR SOLUTION INTRAMUSCULAR; INTRAVENOUS at 00:37

## 2023-01-01 RX ADMIN — EPINEPHRINE 1 MCG/MIN: 1 INJECTION INTRAMUSCULAR; INTRAVENOUS; SUBCUTANEOUS at 20:52

## 2023-01-01 RX ADMIN — MIDODRINE HYDROCHLORIDE 10 MG: 5 TABLET ORAL at 16:24

## 2023-01-01 RX ADMIN — DOBUTAMINE IN DEXTROSE 10 MCG/KG/MIN: 200 INJECTION, SOLUTION INTRAVENOUS at 09:14

## 2023-01-01 RX ADMIN — METHYLPREDNISOLONE SODIUM SUCCINATE 40 MG: 40 INJECTION, POWDER, FOR SOLUTION INTRAMUSCULAR; INTRAVENOUS at 20:08

## 2023-01-01 RX ADMIN — MIDODRINE HYDROCHLORIDE 5 MG: 5 TABLET ORAL at 05:50

## 2023-01-01 RX ADMIN — ACETAMINOPHEN 650 MG: 325 TABLET ORAL at 11:03

## 2023-01-01 RX ADMIN — MIDODRINE HYDROCHLORIDE 10 MG: 5 TABLET ORAL at 17:09

## 2023-01-01 RX ADMIN — IPRATROPIUM BROMIDE AND ALBUTEROL SULFATE 1 AMPULE: 2.5; .5 SOLUTION RESPIRATORY (INHALATION) at 16:38

## 2023-01-01 RX ADMIN — MIDODRINE HYDROCHLORIDE 10 MG: 5 TABLET ORAL at 08:23

## 2023-01-01 RX ADMIN — MEROPENEM 1000 MG: 1 INJECTION, POWDER, FOR SOLUTION INTRAVENOUS at 20:14

## 2023-01-01 RX ADMIN — Medication 4 ML: at 08:08

## 2023-01-01 RX ADMIN — AMIODARONE HYDROCHLORIDE 200 MG: 200 TABLET ORAL at 10:21

## 2023-01-01 RX ADMIN — BUMETANIDE 2 MG/HR: 0.25 INJECTION, SOLUTION INTRAMUSCULAR; INTRAVENOUS at 13:53

## 2023-01-01 RX ADMIN — ALBUMIN (HUMAN) 25 G: 12.5 INJECTION, SOLUTION INTRAVENOUS at 21:10

## 2023-01-01 RX ADMIN — CEFEPIME HYDROCHLORIDE 2000 MG: 2 INJECTION, POWDER, FOR SOLUTION INTRAVENOUS at 03:03

## 2023-01-01 RX ADMIN — FUROSEMIDE 2.5 MG/HR: 100 INJECTION, SOLUTION INTRAMUSCULAR; INTRAVENOUS at 11:02

## 2023-01-01 RX ADMIN — FUROSEMIDE 5 MG/HR: 100 INJECTION, SOLUTION INTRAMUSCULAR; INTRAVENOUS at 12:00

## 2023-01-01 RX ADMIN — POTASSIUM BICARBONATE 20 MEQ: 782 TABLET, EFFERVESCENT ORAL at 15:19

## 2023-01-01 RX ADMIN — MIDAZOLAM HYDROCHLORIDE: 2 INJECTION, SOLUTION INTRAMUSCULAR; INTRAVENOUS at 20:35

## 2023-01-01 RX ADMIN — Medication 40 MCG/MIN: at 19:10

## 2023-01-01 RX ADMIN — POTASSIUM BICARBONATE 20 MEQ: 782 TABLET, EFFERVESCENT ORAL at 09:08

## 2023-01-01 RX ADMIN — MIDODRINE HYDROCHLORIDE 10 MG: 5 TABLET ORAL at 13:29

## 2023-01-01 RX ADMIN — BUMETANIDE 2 MG/HR: 0.25 INJECTION, SOLUTION INTRAMUSCULAR; INTRAVENOUS at 00:19

## 2023-01-01 RX ADMIN — MIDODRINE HYDROCHLORIDE 10 MG: 5 TABLET ORAL at 12:47

## 2023-01-01 RX ADMIN — CEFEPIME HYDROCHLORIDE 2000 MG: 2 INJECTION, POWDER, FOR SOLUTION INTRAVENOUS at 01:33

## 2023-01-01 RX ADMIN — SODIUM CHLORIDE, PRESERVATIVE FREE 10 ML: 5 INJECTION INTRAVENOUS at 10:21

## 2023-01-01 RX ADMIN — VANCOMYCIN HYDROCHLORIDE 1500 MG: 10 INJECTION, POWDER, LYOPHILIZED, FOR SOLUTION INTRAVENOUS at 03:49

## 2023-01-01 RX ADMIN — VASOPRESSIN 0.04 UNITS/MIN: 0.2 INJECTION INTRAVENOUS at 15:36

## 2023-01-01 RX ADMIN — POTASSIUM BICARBONATE 20 MEQ: 782 TABLET, EFFERVESCENT ORAL at 20:09

## 2023-01-01 RX ADMIN — Medication 5 MCG/MIN: at 14:35

## 2023-01-01 RX ADMIN — MORPHINE SULFATE 1 MG: 2 INJECTION, SOLUTION INTRAMUSCULAR; INTRAVENOUS at 16:26

## 2023-01-01 RX ADMIN — LORAZEPAM 2 MG: 2 INJECTION INTRAMUSCULAR; INTRAVENOUS at 22:46

## 2023-01-01 RX ADMIN — SODIUM CHLORIDE, PRESERVATIVE FREE 10 ML: 5 INJECTION INTRAVENOUS at 20:23

## 2023-01-01 RX ADMIN — ASPIRIN 81 MG CHEWABLE TABLET 81 MG: 81 TABLET CHEWABLE at 11:50

## 2023-01-01 RX ADMIN — POTASSIUM CHLORIDE 10 MEQ: 7.46 INJECTION, SOLUTION INTRAVENOUS at 03:52

## 2023-01-01 RX ADMIN — METHYLPREDNISOLONE SODIUM SUCCINATE 40 MG: 40 INJECTION, POWDER, FOR SOLUTION INTRAMUSCULAR; INTRAVENOUS at 10:40

## 2023-01-01 RX ADMIN — CEFEPIME HYDROCHLORIDE 2000 MG: 2 INJECTION, POWDER, FOR SOLUTION INTRAVENOUS at 09:11

## 2023-01-01 RX ADMIN — MEROPENEM 1000 MG: 1 INJECTION, POWDER, FOR SOLUTION INTRAVENOUS at 11:35

## 2023-01-01 RX ADMIN — SODIUM CHLORIDE, PRESERVATIVE FREE 10 ML: 5 INJECTION INTRAVENOUS at 21:20

## 2023-01-01 RX ADMIN — SODIUM CHLORIDE, PRESERVATIVE FREE 10 ML: 5 INJECTION INTRAVENOUS at 21:00

## 2023-01-01 RX ADMIN — CEFEPIME HYDROCHLORIDE 2000 MG: 2 INJECTION, POWDER, FOR SOLUTION INTRAVENOUS at 01:43

## 2023-01-01 RX ADMIN — PROPOFOL 20 MCG/KG/MIN: 10 INJECTION, EMULSION INTRAVENOUS at 05:30

## 2023-01-01 RX ADMIN — METOPROLOL SUCCINATE 25 MG: 25 TABLET, EXTENDED RELEASE ORAL at 11:50

## 2023-01-01 RX ADMIN — DOBUTAMINE IN DEXTROSE 10 MCG/KG/MIN: 200 INJECTION, SOLUTION INTRAVENOUS at 10:01

## 2023-01-01 RX ADMIN — SODIUM CHLORIDE, PRESERVATIVE FREE 10 ML: 5 INJECTION INTRAVENOUS at 11:51

## 2023-01-01 RX ADMIN — SACUBITRIL AND VALSARTAN 1 TABLET: 24; 26 TABLET, FILM COATED ORAL at 09:49

## 2023-01-01 RX ADMIN — MILRINONE LACTATE IN DEXTROSE 0.12 MCG/KG/MIN: 200 INJECTION, SOLUTION INTRAVENOUS at 11:55

## 2023-01-01 RX ADMIN — IPRATROPIUM BROMIDE AND ALBUTEROL SULFATE 1 AMPULE: 2.5; .5 SOLUTION RESPIRATORY (INHALATION) at 09:00

## 2023-01-01 RX ADMIN — SODIUM CHLORIDE, PRESERVATIVE FREE 10 ML: 5 INJECTION INTRAVENOUS at 20:10

## 2023-01-01 RX ADMIN — DOBUTAMINE IN DEXTROSE 10 MCG/KG/MIN: 200 INJECTION, SOLUTION INTRAVENOUS at 16:22

## 2023-01-01 RX ADMIN — DOXYCYCLINE 100 MG: 100 INJECTION, POWDER, LYOPHILIZED, FOR SOLUTION INTRAVENOUS at 21:45

## 2023-01-01 RX ADMIN — ACETAMINOPHEN 650 MG: 325 TABLET ORAL at 20:43

## 2023-01-01 RX ADMIN — MIDAZOLAM 2 MG: 1 INJECTION INTRAMUSCULAR; INTRAVENOUS at 01:35

## 2023-01-01 RX ADMIN — CEFEPIME HYDROCHLORIDE 2000 MG: 2 INJECTION, POWDER, FOR SOLUTION INTRAVENOUS at 02:23

## 2023-01-01 RX ADMIN — IPRATROPIUM BROMIDE AND ALBUTEROL SULFATE 1 AMPULE: 2.5; .5 SOLUTION RESPIRATORY (INHALATION) at 00:07

## 2023-01-01 RX ADMIN — POTASSIUM BICARBONATE 40 MEQ: 782 TABLET, EFFERVESCENT ORAL at 03:48

## 2023-01-01 RX ADMIN — IPRATROPIUM BROMIDE AND ALBUTEROL SULFATE 1 AMPULE: 2.5; .5 SOLUTION RESPIRATORY (INHALATION) at 08:09

## 2023-01-01 RX ADMIN — ATORVASTATIN CALCIUM 80 MG: 40 TABLET, FILM COATED ORAL at 11:50

## 2023-01-01 RX ADMIN — CEFEPIME HYDROCHLORIDE 2000 MG: 2 INJECTION, POWDER, FOR SOLUTION INTRAVENOUS at 09:59

## 2023-01-01 RX ADMIN — LIDOCAINE HYDROCHLORIDE ANHYDROUS AND DEXTROSE MONOHYDRATE 1 MG/MIN: .4; 5 INJECTION, SOLUTION INTRAVENOUS at 21:00

## 2023-01-01 RX ADMIN — ASPIRIN 81 MG CHEWABLE TABLET 81 MG: 81 TABLET CHEWABLE at 08:47

## 2023-01-01 RX ADMIN — MIDODRINE HYDROCHLORIDE 10 MG: 5 TABLET ORAL at 11:03

## 2023-01-01 RX ADMIN — PERFLUTREN 4.4 ML: 6.52 INJECTION, SUSPENSION INTRAVENOUS at 14:23

## 2023-01-01 RX ADMIN — Medication 35 MCG/MIN: at 11:36

## 2023-01-01 RX ADMIN — DOBUTAMINE IN DEXTROSE 10 MCG/KG/MIN: 200 INJECTION, SOLUTION INTRAVENOUS at 06:04

## 2023-01-01 RX ADMIN — DEXAMETHASONE SODIUM PHOSPHATE 4 MG: 4 INJECTION, SOLUTION INTRAMUSCULAR; INTRAVENOUS at 10:44

## 2023-01-01 RX ADMIN — FENTANYL CITRATE 50 MCG: 50 INJECTION, SOLUTION INTRAMUSCULAR; INTRAVENOUS at 06:22

## 2023-01-01 RX ADMIN — SACUBITRIL AND VALSARTAN 1 TABLET: 24; 26 TABLET, FILM COATED ORAL at 08:47

## 2023-01-01 RX ADMIN — LEVOTHYROXINE SODIUM 75 MCG: 25 TABLET ORAL at 05:30

## 2023-01-01 RX ADMIN — VASOPRESSIN 0.04 UNITS/MIN: 0.2 INJECTION INTRAVENOUS at 06:49

## 2023-01-01 RX ADMIN — LEVOTHYROXINE SODIUM 75 MCG: 25 TABLET ORAL at 06:26

## 2023-01-01 RX ADMIN — IPRATROPIUM BROMIDE AND ALBUTEROL SULFATE 1 AMPULE: 2.5; .5 SOLUTION RESPIRATORY (INHALATION) at 00:29

## 2023-01-01 RX ADMIN — ACETAMINOPHEN 650 MG: 325 TABLET ORAL at 21:55

## 2023-01-01 RX ADMIN — MIDODRINE HYDROCHLORIDE 10 MG: 5 TABLET ORAL at 15:17

## 2023-01-01 RX ADMIN — SODIUM CHLORIDE 500 ML: 9 INJECTION, SOLUTION INTRAVENOUS at 01:43

## 2023-01-01 RX ADMIN — SODIUM CHLORIDE, PRESERVATIVE FREE 10 ML: 5 INJECTION INTRAVENOUS at 21:04

## 2023-01-01 RX ADMIN — SACUBITRIL AND VALSARTAN 1 TABLET: 24; 26 TABLET, FILM COATED ORAL at 11:43

## 2023-01-01 RX ADMIN — METHYLPREDNISOLONE SODIUM SUCCINATE 125 MG: 125 INJECTION, POWDER, FOR SOLUTION INTRAMUSCULAR; INTRAVENOUS at 13:07

## 2023-01-01 RX ADMIN — LEVOTHYROXINE SODIUM 75 MCG: 25 TABLET ORAL at 06:24

## 2023-01-01 RX ADMIN — PROPOFOL 20 MCG/KG/MIN: 10 INJECTION, EMULSION INTRAVENOUS at 21:30

## 2023-01-01 RX ADMIN — ACETAMINOPHEN 650 MG: 325 TABLET ORAL at 02:58

## 2023-01-01 RX ADMIN — ATORVASTATIN CALCIUM 80 MG: 40 TABLET, FILM COATED ORAL at 08:47

## 2023-01-01 RX ADMIN — EPINEPHRINE 10 MCG/MIN: 1 INJECTION INTRAMUSCULAR; INTRAVENOUS; SUBCUTANEOUS at 17:51

## 2023-01-01 RX ADMIN — MIDODRINE HYDROCHLORIDE 10 MG: 5 TABLET ORAL at 08:47

## 2023-01-01 RX ADMIN — SODIUM CHLORIDE 1000 ML: 9 INJECTION, SOLUTION INTRAVENOUS at 05:44

## 2023-01-01 RX ADMIN — ALBUMIN, HUMAN INJ 5% 25 G: 5 SOLUTION at 21:10

## 2023-01-01 RX ADMIN — ASPIRIN 81 MG CHEWABLE TABLET 81 MG: 81 TABLET CHEWABLE at 10:21

## 2023-01-01 RX ADMIN — DOBUTAMINE IN DEXTROSE 10 MCG/KG/MIN: 200 INJECTION, SOLUTION INTRAVENOUS at 04:32

## 2023-01-01 RX ADMIN — DEXAMETHASONE SODIUM PHOSPHATE 4 MG: 4 INJECTION, SOLUTION INTRAMUSCULAR; INTRAVENOUS at 00:27

## 2023-01-01 RX ADMIN — CEFEPIME HYDROCHLORIDE 2000 MG: 2 INJECTION, POWDER, FOR SOLUTION INTRAVENOUS at 17:55

## 2023-01-01 RX ADMIN — SODIUM CHLORIDE, PRESERVATIVE FREE 10 ML: 5 INJECTION INTRAVENOUS at 09:52

## 2023-01-01 RX ADMIN — CEFEPIME HYDROCHLORIDE 2000 MG: 2 INJECTION, POWDER, FOR SOLUTION INTRAVENOUS at 17:38

## 2023-01-01 RX ADMIN — ATORVASTATIN CALCIUM 80 MG: 40 TABLET, FILM COATED ORAL at 09:49

## 2023-01-01 RX ADMIN — ASPIRIN 81 MG CHEWABLE TABLET 81 MG: 81 TABLET CHEWABLE at 09:51

## 2023-01-01 RX ADMIN — DOBUTAMINE IN DEXTROSE 5 MCG/KG/MIN: 200 INJECTION, SOLUTION INTRAVENOUS at 14:57

## 2023-01-01 RX ADMIN — DOBUTAMINE IN DEXTROSE 10 MCG/KG/MIN: 200 INJECTION, SOLUTION INTRAVENOUS at 18:00

## 2023-01-01 RX ADMIN — Medication 4 ML: at 15:32

## 2023-01-01 RX ADMIN — VANCOMYCIN HYDROCHLORIDE 1500 MG: 10 INJECTION, POWDER, LYOPHILIZED, FOR SOLUTION INTRAVENOUS at 03:01

## 2023-01-01 RX ADMIN — AMIODARONE HYDROCHLORIDE 200 MG: 200 TABLET ORAL at 09:49

## 2023-01-01 RX ADMIN — SODIUM CHLORIDE: 9 INJECTION, SOLUTION INTRAVENOUS at 21:36

## 2023-01-01 RX ADMIN — ATORVASTATIN CALCIUM 80 MG: 40 TABLET, FILM COATED ORAL at 10:21

## 2023-01-01 RX ADMIN — CEFEPIME HYDROCHLORIDE 2000 MG: 2 INJECTION, POWDER, FOR SOLUTION INTRAVENOUS at 16:33

## 2023-01-01 RX ADMIN — VASOPRESSIN 0.04 UNITS/MIN: 0.2 INJECTION INTRAVENOUS at 00:22

## 2023-01-01 RX ADMIN — FUROSEMIDE 5 MG/HR: 100 INJECTION, SOLUTION INTRAMUSCULAR; INTRAVENOUS at 06:07

## 2023-01-01 ASSESSMENT — PAIN DESCRIPTION - DESCRIPTORS
DESCRIPTORS: ACHING
DESCRIPTORS: SHARP
DESCRIPTORS: DISCOMFORT
DESCRIPTORS: SHARP
DESCRIPTORS: ACHING
DESCRIPTORS: ACHING;GNAWING
DESCRIPTORS: ACHING

## 2023-01-01 ASSESSMENT — PAIN - FUNCTIONAL ASSESSMENT
PAIN_FUNCTIONAL_ASSESSMENT: ACTIVITIES ARE NOT PREVENTED

## 2023-01-01 ASSESSMENT — PAIN SCALES - GENERAL
PAINLEVEL_OUTOF10: 9
PAINLEVEL_OUTOF10: 9
PAINLEVEL_OUTOF10: 5
PAINLEVEL_OUTOF10: 0
PAINLEVEL_OUTOF10: 9
PAINLEVEL_OUTOF10: 10
PAINLEVEL_OUTOF10: 0
PAINLEVEL_OUTOF10: 8
PAINLEVEL_OUTOF10: 0
PAINLEVEL_OUTOF10: 10
PAINLEVEL_OUTOF10: 8
PAINLEVEL_OUTOF10: 0
PAINLEVEL_OUTOF10: 8

## 2023-01-01 ASSESSMENT — ENCOUNTER SYMPTOMS
VOICE CHANGE: 0
BLOOD IN STOOL: 0
SHORTNESS OF BREATH: 1
NAUSEA: 0
DIARRHEA: 0
SINUS PRESSURE: 0
SINUS PAIN: 0
ABDOMINAL PAIN: 0
EYE PAIN: 0
VOMITING: 0
BACK PAIN: 0
COUGH: 0
EYE DISCHARGE: 0
CHEST TIGHTNESS: 0

## 2023-01-01 ASSESSMENT — PULMONARY FUNCTION TESTS
PIF_VALUE: 28
PIF_VALUE: 27
PIF_VALUE: 27
PIF_VALUE: 28
PIF_VALUE: 27
PIF_VALUE: 33
PIF_VALUE: 25
PIF_VALUE: 27
PIF_VALUE: 28
PIF_VALUE: 34
PIF_VALUE: 26
PIF_VALUE: 33
PIF_VALUE: 27
PIF_VALUE: 28
PIF_VALUE: 29
PIF_VALUE: 28
PIF_VALUE: 35
PIF_VALUE: 30
PIF_VALUE: 27
PIF_VALUE: 30
PIF_VALUE: 31
PIF_VALUE: 28
PIF_VALUE: 28
PIF_VALUE: 34
PIF_VALUE: 28
PIF_VALUE: 28
PIF_VALUE: 30
PIF_VALUE: 29
PIF_VALUE: 28
PIF_VALUE: 37
PIF_VALUE: 31
PIF_VALUE: 29
PIF_VALUE: 28
PIF_VALUE: 29
PIF_VALUE: 27
PIF_VALUE: 28
PIF_VALUE: 29
PIF_VALUE: 27
PIF_VALUE: 28
PIF_VALUE: 27
PIF_VALUE: 27
PIF_VALUE: 31
PIF_VALUE: 26
PIF_VALUE: 26
PIF_VALUE: 28
PIF_VALUE: 28
PIF_VALUE: 25
PIF_VALUE: 28
PIF_VALUE: 27
PIF_VALUE: 28
PIF_VALUE: 29
PIF_VALUE: 34
PIF_VALUE: 27
PIF_VALUE: 32
PIF_VALUE: 34
PIF_VALUE: 33
PIF_VALUE: 27
PIF_VALUE: 30
PIF_VALUE: 29
PIF_VALUE: 27
PIF_VALUE: 29
PIF_VALUE: 26
PIF_VALUE: 28
PIF_VALUE: 27
PIF_VALUE: 27
PIF_VALUE: 31
PIF_VALUE: 29
PIF_VALUE: 26
PIF_VALUE: 29
PIF_VALUE: 32
PIF_VALUE: 34
PIF_VALUE: 29
PIF_VALUE: 27
PIF_VALUE: 30
PIF_VALUE: 29

## 2023-01-01 ASSESSMENT — PAIN DESCRIPTION - LOCATION
LOCATION: CHEST;BACK
LOCATION: RIB CAGE
LOCATION: RIB CAGE
LOCATION: CHEST
LOCATION: RIB CAGE
LOCATION: CHEST

## 2023-01-01 ASSESSMENT — PAIN DESCRIPTION - ORIENTATION
ORIENTATION: RIGHT;MID;OUTER
ORIENTATION: RIGHT;OUTER
ORIENTATION: RIGHT
ORIENTATION: LEFT
ORIENTATION: LEFT

## 2023-01-11 PROCEDURE — 93296 REM INTERROG EVL PM/IDS: CPT | Performed by: INTERNAL MEDICINE

## 2023-01-11 PROCEDURE — 93295 DEV INTERROG REMOTE 1/2/MLT: CPT | Performed by: INTERNAL MEDICINE

## 2023-01-11 PROCEDURE — 93297 REM INTERROG DEV EVAL ICPMS: CPT | Performed by: INTERNAL MEDICINE

## 2023-01-12 ENCOUNTER — PROCEDURE VISIT (OUTPATIENT)
Dept: CARDIOLOGY CLINIC | Age: 68
End: 2023-01-12
Payer: MEDICARE

## 2023-01-12 DIAGNOSIS — I49.5 SINUS NODE DYSFUNCTION (HCC): ICD-10-CM

## 2023-01-12 DIAGNOSIS — Z95.810 ICD (IMPLANTABLE CARDIOVERTER-DEFIBRILLATOR), BIVENTRICULAR, IN SITU: ICD-10-CM

## 2023-02-01 PROBLEM — I50.9 HEART FAILURE (HCC): Status: ACTIVE | Noted: 2023-01-01

## 2023-02-01 PROBLEM — E44.0 MODERATE MALNUTRITION (HCC): Chronic | Status: ACTIVE | Noted: 2023-01-01

## 2023-02-01 NOTE — ED PROVIDER NOTES
[address] ENCOUNTER      Pt Name: Caroline Pereyra  MRN: [id number]  [place] 1955  Date of evaluation: 2/1/2023  Provider: Rosalba Rollins MD    CHIEF COMPLAINT       Chief Complaint   Patient presents with    Shortness of Breath     Started 3 hrs ago         HISTORY OF PRESENT ILLNESS      Caroline Pereyra is a 74 y.o. male who presents to the emergency department  for   Chief Complaint   Patient presents with    Shortness of Breath     Started 3 hrs ago       74 yom with h/o CHF with low EF (10% on last echo), ischemic cardiomyopathy s/p pacer/defibrillator, paroxysmal afib, mood disorder, COPD with no home oxygen requirement presents with hypoxia and shortness of breath. Is coming from his facility. He is a bit of a difficult historian. He states that he abruptly developed shortness of breath at this facility. EMS was called to scene. He was reportedly very hypoxic for them on room air. They did place him on some supplemental oxygen with improvement in his saturations. He presented to the emergency department on nasal cannula but only had sats in the 70s. He was switched over to a nonrebreather initially with improvement in saturations and ultimately due to his work of breathing he was placed on a BiPAP. He denies any chest pain or abdominal pain. Denies any sick contacts. He is answering all questions and following commands. He denies any missed medication doses. Nursing Notes, Triage Notes & Vital Signs were reviewed. REVIEW OF SYSTEMS    (2-9 systems for level 4, 10 or more for level 5)     Review of Systems   Respiratory:  Positive for shortness of breath. Except as noted above the remainder of the review of systems was reviewed and negative.        PAST MEDICAL HISTORY     Past Medical History:   Diagnosis Date    A-fib [facility])     on Coumadin    Abdominal pain 3/22/2012    Acute on chronic renal failure (HCC)     Allergic rhinitis     Bipolar affective disorder unspecified     possible schziophrenia per pt- Seeing Dr. Zelda Howard of left shoulder 2007    s/p injection     CAD (coronary artery disease)     see Leonidas    Cardiomyopathy ([facility] [address]     NYHA FC II-III    CHF (NYHA class III, ACC/AHA stage C) ([facility] [address] 4/27/2012    Cholelithiasis 5/8/2012    per CT    Chronic kidney disease (CKD)     Chronic pain     on Ultram- dispensed at [facility] per CSP/psych program    Depression     Displacement of electrode lead of cardiac pacemaker 10/4/2019    10/4/2019 RV lead got pulled back in the connector    DJD (degenerative joint disease) of cervical spine     Elevated serum creatinine     Erectile dysfunction     Fractures     Left collar bone x8, left wrist, bilateral toes    GERD (gastroesophageal reflux disease)     GERD (gastroesophageal reflux disease)     H/O echocardiogram 03/26/15    EF 20% Mod dilated LV with severly reduced systolic function. pacer wire noted in right ventricle and right atrium. Hiatal hernia 5/8/2012    per CT    History of nuclear stress test 05/01/2017    lexiscan-large anteroapical MI, no new changes, EF16%    Hypertension     Hypothyroid     Started by Dr. Tan Lopez     Ischemic heart disease     Lipidemia 4/27/2012    Lithium toxicity     4/2015    MI, old 4/27/2012    Paronychia 3/12/2012    S/P cardiac cath 2/21/14    Schizoaffective disorder [facility])     old chart also gives hx of paranoid schizo    Unintentional weight loss 3/22/2012    Possible due to bipolar/tabby/ paranoia (sept 2012) with refusing to eat food from the grocery store believing it's being poisoned        Prior to Admission medications    Medication Sig Start Date End Date Taking? Authorizing Provider   amiodarone (CORDARONE) 200 MG tablet Take 1 tablet by mouth daily 12/16/21   Elena Cole MD   traMADol (ULTRAM) 50 MG tablet Take 1 tablet by mouth every 6 hours as needed for Pain Moderate (4-6). 7/1/21   Historical Provider, MD   digoxin (LANOXIN) 125 MCG tablet Take 1 tablet by mouth daily 2 tab every 12 hours x 48 hours then one daily. 9/15/21   Reinier Nascimento MD   metoprolol succinate (TOPROL XL) 25 MG extended release tablet Take 1 tablet by mouth daily 9/15/21   Ameena Ward MD   HYDROcodone-acetaminophen (NORCO) 5-325 MG per tablet 1 tablet 3 times daily as needed. 3/5/20   Historical Provider, MD   ziprasidone (GEODON) 40 MG capsule 1 capsule 2 times daily 2/19/20   Historical Provider, MD   diltiazem (CARDIZEM) 30 MG tablet Take 1 tablet by mouth every 8 hours  Patient taking differently: Take 45 mg by mouth every 8 hours Pt takes 1.5 tablets daily 10/4/19   Johny Harden MD   warfarin (COUMADIN) 2.5 MG tablet Take 2 mg by mouth Daily with supper     Historical Provider, MD   acetaminophen (TYLENOL) 325 MG tablet Take 650 mg by mouth every 4 hours as needed for Pain    Historical Provider, MD   ezetimibe (ZETIA) 10 MG tablet Take 10 mg by mouth daily    Historical Provider, MD   atorvastatin (LIPITOR) 80 MG tablet Take 1 tablet by mouth daily 4/18/17   Tiffanie Engle MD   nitroGLYCERIN (NITROSTAT) 0.3 MG SL tablet Place 0.3 mg under the tongue every 5 minutes as needed for Chest pain    Historical Provider, MD   levothyroxine (SYNTHROID) 75 MCG tablet Take 75 mcg by mouth Daily. Historical Provider, MD   benztropine (COGENTIN) 1 MG tablet Take 1 mg by mouth 2 times daily     Historical Provider, MD   loratadine (CLARITIN) 10 MG tablet Take 1 tablet by mouth daily.  4/23/13   Chris Ortega MD        Patient Active Problem List   Diagnosis    Arthritis    Hypothyroid    Unintentional weight loss    Anemia    CHF (NYHA class III, ACC/AHA stage C) (HCC)    Cardiomyopathy    ASHD (arteriosclerotic heart disease)    CAD- S/P LAD stents 2013 at [facility]    Current smoker    Bipolar affective disorder ([facility] [address]    Prolonged QT interval    Allergic rhinitis    GERD (gastroesophageal reflux disease)    Lithium toxicity    Acute-on-chronic renal failure (HCC)    Hyponatremia    Metabolic acidosis, normal anion gap (NAG)    PAF (paroxysmal atrial fibrillation) (HCC)    Lethargy    Hyperlipidemia    Biventricular ICD replaced 2019    Hiatal hernia    Gilmore's esophagus    CCC (chronic calculous cholecystitis)    Elevated brain natriuretic peptide (BNP) level    Acute kidney injury (HCC)    Elevated troponin level    Chest pain         SURGICAL HISTORY       Past Surgical History:   Procedure Laterality Date    ANGIOPLASTY      per old chart pt had angioplasty (LAD) with cutting balloon and arthrectomy done 7/2010    CARDIAC CATHETERIZATION      per old chart pt had cardiac cath 12/2010, 2/2011,4/2012, and 2/2014    CARDIAC SURGERY      arteriogram; stent    CHOLECYSTECTOMY      ENDOSCOPY, COLON, DIAGNOSTIC  9/2015    GASTRIC FUNDOPLICATION  64/56/1140    Robotic laparoscopic assisted nissen, cholecystectomy    HIATAL HERNIA REPAIR  10/7/15    PACEMAKER PLACEMENT      per old chart pt had biV ICD inserted 1/2011 and then 5/2014 had left ventricular lead replacement done    SINUS SURGERY      with allergy testing done in the past- Dr. Nakul Arreola (([place])    TONSILLECTOMY           CURRENT MEDICATIONS       Previous Medications    ACETAMINOPHEN (TYLENOL) 325 MG TABLET    Take 650 mg by mouth every 4 hours as needed for Pain    AMIODARONE (CORDARONE) 200 MG TABLET    Take 1 tablet by mouth daily    ATORVASTATIN (LIPITOR) 80 MG TABLET    Take 1 tablet by mouth daily    BENZTROPINE (COGENTIN) 1 MG TABLET    Take 1 mg by mouth 2 times daily     DIGOXIN (LANOXIN) 125 MCG TABLET    Take 1 tablet by mouth daily 2 tab every 12 hours x 48 hours then one daily. DILTIAZEM (CARDIZEM) 30 MG TABLET    Take 1 tablet by mouth every 8 hours    EZETIMIBE (ZETIA) 10 MG TABLET    Take 10 mg by mouth daily    HYDROCODONE-ACETAMINOPHEN (NORCO) 5-325 MG PER TABLET    1 tablet 3 times daily as needed. LEVOTHYROXINE (SYNTHROID) 75 MCG TABLET    Take 75 mcg by mouth Daily. LORATADINE (CLARITIN) 10 MG TABLET    Take 1 tablet by mouth daily. METOPROLOL SUCCINATE (TOPROL XL) 25 MG EXTENDED RELEASE TABLET    Take 1 tablet by mouth daily    NITROGLYCERIN (NITROSTAT) 0.3 MG SL TABLET    Place 0.3 mg under the tongue every 5 minutes as needed for Chest pain    TRAMADOL (ULTRAM) 50 MG TABLET    Take 1 tablet by mouth every 6 hours as needed for Pain Moderate (4-6).     WARFARIN (COUMADIN) 2.5 MG TABLET    Take 2 mg by mouth Daily with supper     ZIPRASIDONE (GEODON) 40 MG CAPSULE    1 capsule 2 times daily       ALLERGIES     Penicillins, Azithromycin, Clozapine [clozapine], Haldol [haloperidol lactate], Insulins, Niacin and related, Ultram [tramadol], Zomig [zolmitriptan], Codeine, Depakote [divalproex sodium], Haldol [haloperidol lactate], Hydrocodone, Ibuprofen, Imitrex [sumatriptan], Maxalt [rizatriptan], and Neurontin [gabapentin]    FAMILY HISTORY       Family History   Problem Relation Age of Onset    Mental Illness Mother         substance abuse    Mental Illness Sister     Mental Illness Sister           SOCIAL HISTORY       Social History     Socioeconomic History    Marital status:    Tobacco Use    Smoking status: Every Day     Packs/day: 0.50     Years: 46.00     Pack years: 23.00     Types: Cigarettes    Smokeless tobacco: Never   Vaping Use    Vaping Use: Never used   Substance and Sexual Activity    Alcohol use: No     Alcohol/week: 0.0 standard drinks    Drug use: No    Sexual activity: Not Currently       SCREENINGS    Arabella Coma Scale  Eye Opening: Spontaneous  Best Verbal Response: Oriented  Best Motor Response: Obeys commands  Vulcan Coma Scale Score: 15          PHYSICAL EXAM    (up to 7 for level 4, 8 or more for level 5)     ED Triage Vitals [02/01/23 0014]   BP Temp Temp src Heart Rate Resp SpO2 Height Weight   (!) 105/57 -- -- 75 30 (!) 85 % -- --       Physical Exam  Vitals reviewed. HENT:      Head: Normocephalic and atraumatic. Eyes:      Extraocular Movements: Extraocular movements intact. Pupils: Pupils are equal, round, and reactive to light. Cardiovascular:      Rate and Rhythm: Tachycardia present. Pulmonary:      Effort: Accessory muscle usage and respiratory distress present. Breath sounds: Rhonchi and rales present. Comments: Increased work of breathing; b/l rhonchi  Abdominal:      Palpations: Abdomen is soft. Tenderness: There is no abdominal tenderness. There is no guarding. Musculoskeletal:      Cervical back: Normal range of motion and neck supple. Right lower leg: Edema present. Left lower leg: Edema present. Skin:     General: Skin is warm. Capillary Refill: Capillary refill takes 2 to 3 seconds. Neurological:      General: No focal deficit present.        DIAGNOSTIC RESULTS     Labs Reviewed   COMPREHENSIVE METABOLIC PANEL - Abnormal; Notable for the following components:       Result Value    Sodium 131 (*)     Potassium 3.0 (*)     Chloride 96 (*)     CO2 20 (*)     Creatinine 1.4 (*)     Est, Glom Filt Rate 56 (*)     Glucose 187 (*)     All other components within normal limits   CBC WITH AUTO DIFFERENTIAL - Abnormal; Notable for the following components:    RBC 4.04 (*)     Hemoglobin 12.5 (*)     Hematocrit 39.6 (*)     MCHC 31.6 (*)     MPV 11.8 (*)     Segs Relative 74.0 (*)     Lymphocytes % 13.1 (*)     Monocytes % 11.9 (*)     All other components within normal limits   BLOOD GAS, VENOUS - Abnormal; Notable for the following components:    pO2, Gilberto 21 (*)     Base Excess 5 (*)     O2 Sat, Gilberto 43.7 (*)     All other components within normal limits   BRAIN NATRIURETIC PEPTIDE - Abnormal; Notable for the following components:    Pro-BNP 4,675 (*)     All other components within normal limits   LACTATE, SEPSIS - Abnormal; Notable for the following components:    Lactic Acid, Sepsis 3.4 (*)     All other components within normal limits   LACTATE, SEPSIS - Abnormal; Notable for the following components:    Lactic Acid, Sepsis 2.3 (*)     All other components within normal limits   PROTIME-INR - Abnormal; Notable for the following components:    Protime 56.2 (*)     All other components within normal limits   CULTURE, BLOOD 1   CULTURE, BLOOD 1   RESPIRATORY PANEL, MOLECULAR, WITH COVID-19   CULTURE, URINE   TROPONIN   URINALYSIS   PROCALCITONIN          EKG: All EKG's are interpreted by the Emergency Department Physician who either signs or Co-signs this chart in the absence of a cardiologist.       EKG Interpretation    Interpreted by emergency department physician    EKG is interpreted by me. EKG shows a rhythm at 108 bpm, rhythm is paced, left axis deviation, he does have a left bundle branch block, wide QRS is at baseline, UT interval of 200, QRS duration of 218, QTC of 696. Final impression, sinus tachycardia, left bundle branch block. From review of records, prior EKGs have shown similar morphology    Prashanth Stanford MD     RADIOLOGY:     Non-plain film images such as CT, Ultrasound and MRI are read by the radiologist. Plain radiographic images are visualized and preliminarily interpreted by the emergency physician. Interpretation per the Radiologist below, if available at the time of this note:    XR CHEST PORTABLE   Preliminary Result   1. Diffuse ground-glass airspace opacities concerning for infection over   edema. 2. Cardiomegaly.                ED BEDSIDE ULTRASOUND:   Performed by ED Physician Prashanth Stanford MD       LABS:  Labs Reviewed   COMPREHENSIVE METABOLIC PANEL - Abnormal; Notable for the following components:       Result Value    Sodium 131 (*)     Potassium 3.0 (*)     Chloride 96 (*)     CO2 20 (*)     Creatinine 1.4 (*)     Est, Glom Filt Rate 56 (*)     Glucose 187 (*)     All other components within normal limits   CBC WITH AUTO DIFFERENTIAL - Abnormal; Notable for the following components:    RBC 4.04 (*)     Hemoglobin 12.5 (*)     Hematocrit 39.6 (*)     MCHC 31.6 (*)     MPV 11.8 (*)     Segs Relative 74.0 (*)     Lymphocytes % 13.1 (*)     Monocytes % 11.9 (*)     All other components within normal limits   BLOOD GAS, VENOUS - Abnormal; Notable for the following components:    pO2, Gilberto 21 (*)     Base Excess 5 (*)     O2 Sat, Gilberto 43.7 (*)     All other components within normal limits   BRAIN NATRIURETIC PEPTIDE - Abnormal; Notable for the following components:    Pro-BNP 4,675 (*)     All other components within normal limits   LACTATE, SEPSIS - Abnormal; Notable for the following components:    Lactic Acid, Sepsis 3.4 (*)     All other components within normal limits   LACTATE, SEPSIS - Abnormal; Notable for the following components:    Lactic Acid, Sepsis 2.3 (*)     All other components within normal limits   PROTIME-INR - Abnormal; Notable for the following components:    Protime 56.2 (*)     All other components within normal limits   CULTURE, BLOOD 1   CULTURE, BLOOD 1   RESPIRATORY PANEL, MOLECULAR, WITH COVID-19   CULTURE, URINE   TROPONIN   URINALYSIS   PROCALCITONIN       All other labs were within normal range or not returned as of this dictation.     EMERGENCY DEPARTMENT COURSE and DIFFERENTIAL DIAGNOSIS/MDM:   Vitals:    Vitals:    02/01/23 0036 02/01/23 0052 02/01/23 0145 02/01/23 0248   BP:  103/61  116/67   Pulse: 95   75   Resp: 25   20   Temp:    98 °F (36.7 °C)   TempSrc:    Axillary   SpO2: 97%   100%   Weight:   180 lb (81.6 kg)    Height:   6' 4.5\" (1.943 m)            MDM  Number of Diagnoses or Management Options  Acute on chronic congestive heart failure, unspecified heart failure type (HCC)  Acute on chronic respiratory failure with hypoxia (HCC)  COPD exacerbation (HCC)  Hypokalemia  Pneumonia of both lungs due to infectious organism, unspecified part of lung  Diagnosis management comments: 81-year-old male with a history of CHF with low EF, ischemic cardiomyopathy status post pacer defibrillator, proximal A. fib, mood disorder, COPD with no home oxygen requirement presents from his facility with hypoxia and shortness of breath. He reports that the symptoms started acutely a few hours before coming to the emergency department. EMS was called to scene. He was significantly hypoxic for EMS on room air was placed on supplemental oxygen. He continued to be hypoxic in the 70s upon presentation to the emergency department. We transitioned him first to a nonrebreather and then to BiPAP with improvement in his saturations and his work of breathing. He does not have a home oxygen requirement. He denies any sick contacts. Is not have any chest pain or abdominal pain. GCS of 15. Once placed on BiPAP, saturations have improved to the high 90s. Blood pressure within normal limits. He was tachycardic in the emergency department initially. Respirations were not labored and elevated. He does meet sirs criteria upon presentation. After being placed on BiPAP, labs including CMP, CBC, blood cultures, lactic, troponin, BNP and chest x-ray are obtained. Chest xrays interpreted by me. Chest x-ray does show fairly significant bilateral infiltrates. This could be concerning for edema versus infection. Given the clinical picture, I do favor likely acute onset pulmonary edema related to possible heart failure exacerbation radiology does read as bilateral multifocal infiltrates which seems to be more consistent with infection versus edema. He is given a dose of 40 mg of IV Lasix. So started on broad-spectrum antibiotics with IV vancomycin as well as IV cefepime and IV Flagyl. He is treated with inhaled bronchodilators as well as IV Solu-Medrol. At this time his work of breathing is decreased, his heart rate has normalized. EKG is interpreted by me. EKG does seem consistent with prior EKGs in terms of morphology. No acute abnormality noted.     Multiple labs are ordered including CBC, CMP, blood cultures, lactic's, VBG, troponin and BNP    BNP is elevated above prior values. BNP is about 4700. VBG does not show hypercapnia. He has no significant leukocytosis. His serum creatinine appears to be within its baseline range. Electrolytes do show mildly low sodium which is consistent with prior values. His potassium is low at 3. His troponin is detectable 0.015. INR is elevated at 4.29. Initial lactic is 3.4. Given his history of low EF and concern for volume overload and pulmonary edema, I am deferring any fluid administration at this time. His repeat lactic did improve to 2.3    He is treated with with IV and oral potassium for the hypokalemia. At this stage, he has been treated with BiPAP, broad-spectrum IV antibiotics, IV Solu-Medrol, inhaled bronchodilators, IV Lasix. Etiology of symptoms seems to be acute hypoxic respiratory secondary failure to likely CHF exacerbation. There may be a component of pneumonia. He also has hypokalemia. He will be admitted to hospital for further evaluation management. Patient is agreeable to admission       Amount and/or Complexity of Data Reviewed  Clinical lab tests: reviewed  Tests in the radiology section of CPT®: reviewed  Decide to obtain previous medical records or to obtain history from someone other than the patient: yes        -  Patient seen and evaluated in the emergency department. -  Triage and nursing notes reviewed and incorporated. -  Old chart records reviewed and incorporated. -  Work-up included:  See above  -  Results discussed with patient. REASSESSMENT          CRITICAL CARE TIME       Total critical care time today provided was 35 minutes. This excludes seperately billable procedure. Critical care time provided for respiratory failure, chf exacerbation that required close evaluation and/or intervention with concern for patient decompensation.        This excludes seperately billable procedures and family discussion time. Critical care time provided for obtaining history, conducting a physical exam, performing and monitoring interventions, ordering, collecting and interpreting tests, and establishing medical decision-making. There was a potential for life/limb threatening pathology requiring close evaluation and intervention with concern for patient decompensation. CONSULTS:  PHARMACY TO DOSE VANCOMYCIN    PROCEDURES:  None performed unless otherwise noted below     Procedures        FINAL IMPRESSION      1. Hypokalemia    2. Acute on chronic respiratory failure with hypoxia (HCC)    3. Pneumonia of both lungs due to infectious organism, unspecified part of lung    4. COPD exacerbation ([facility] [address]    5. Acute on chronic congestive heart failure, unspecified heart failure type ([facility] [address]          DISPOSITION/PLAN   DISPOSITION Decision To Admit 02/01/2023 02:31:22 AM      PATIENT REFERRED TO:  No follow-up provider specified. DISCHARGE MEDICATIONS:  New Prescriptions    No medications on file       ED Provider Disposition Time  DISPOSITION Decision To Admit 02/01/2023 02:31:22 AM      Appropriate personal protective equipment was worn during the patient's evaluation. These included surgical, eye protection, surgical mask or in 95 respirator and gloves. The patient was also placed in a surgical mask for the prevention of possible spread of respiratory viral illnesses. The Patient was instructed to read the package inserts with any medication that was prescribed. Major potential reactions and medication interactions were discussed. The Patient understands that there are numerous possible adverse reactions not covered. The patient was also instructed to arrange follow-up with his or her primary care provider for review of any pending labwork or incidental findings on any radiology results that were obtained.   All efforts were made to discuss any incidental findings that require further monitoring. Controlled Substances Monitoring:     No flowsheet data found.     (Please note that portions of this note were completed with a voice recognition program.  Efforts were made to edit the dictations but occasionally words are mis-transcribed.)    Naren Lindsay MD (electronically signed)  Attending Emergency Physician            Naren Lindsay MD  02/01/23 [id number]

## 2023-02-01 NOTE — ED NOTES
Report received from [facility] and care assumed at this time.      Sophia Olson, SHANIQUA  02/01/23 9426

## 2023-02-01 NOTE — PROGRESS NOTES
Patient seen and examined by my colleague this morning, please see his H&P for further details. Briefly, this is a 59-year-old male with PMH ischemic cardiomyopathy with systolic heart failure (LVEF 10% in 11/2021) s/p BiV AICD who presents with progressively worsening SOB for 3 days associated with orthopnea, PND. Hypoxic and requiring 6L NC on arrival.  Appear to be volume overloaded on exam and CXR with proBNP 4675, lactic acid 3.4 (downtrending). Did require BiPAP due to increased work of breathing. Patient initiated on IV Lasix 40 twice daily (unclear home regimen due to no med list from NH). On my evaluation, patient still on BiPAP and no longer has increased work of breathing. He is alert, oriented and asymptomatic from a blood pressure standpoint. Transitioned BiPAP to NC (initially requiring 10 L HFNC, will wean as able). Per RN, did have good output with IV Lasix however Will likely require Lasix drip as BP borderline. Appreciate cardiology input on this.

## 2023-02-01 NOTE — PROGRESS NOTES
This nurse took patient off of O2 per provider. Patient dropped to 85% in 3.5 mins. 10L O2 wia high flow cannula was reapplied and attending notified.

## 2023-02-01 NOTE — CONSULTS
Comprehensive Nutrition Assessment    Type and Reason for Visit:  Initial, Consult, Patient Education    Nutrition Recommendations/Plan:   Consult speech for swallow eval   Downgrade to Easy to Chew, No Salt Added   Begin standard high calorie, high protein oral nutrition supplements BID   Document PO intakes in I/O      Malnutrition Assessment:  Malnutrition Status: Moderate malnutrition (02/01/23 6867)    Context:  Chronic Illness     Findings of the 6 clinical characteristics of malnutrition:  Energy Intake:  Mild decrease in energy intake (Comment)  Weight Loss:  No significant weight loss     Body Fat Loss:  Mild body fat loss Orbital, Triceps   Muscle Mass Loss:  Mild muscle mass loss Temples (temporalis), Clavicles (pectoralis & deltoids), Thigh (quadraceps), Calf (gastrocnemius), Hand (interosseous)  Fluid Accumulation:  Mild Extremities, Generalized   Strength:  Not Performed    Nutrition Assessment:    RD consulted for heart failure. Pt currently on 40 L/min [address] at visit, finishing up 50% of meal. Pt reports hx of recent gallbladder surgery, monitoring intakes, Discussed low fat intake, low sodium, and adequate calories/protein intakes. Pt would like to start oral nutrition supplements dos. Pt is underweight, performed NFPE, mild wasting noted. States UBW of 170 lb. Has difficulty swallowing/chewing. States swallowing issues due to \"synthryoid causes swelling\". Follow as high nutrition risk. Nutrition Related Findings:    +1 Pitting edema Wound Type: None       Current Nutrition Intake & Therapies:    Average Meal Intake: 51-75% (consumed only his meatloaf)  Average Supplements Intake: None Ordered  ADULT DIET; Regular; No Added Salt (3-4 gm)    Anthropometric Measures:  Height: 6' 4.5\" (194.3 cm)  Ideal Body Weight (IBW): 205 lbs (93 kg)    Admission Body Weight:  (stated)  Current Body Weight: 179 lb 14.3 oz (81.6 kg), 87.8 % IBW.  Weight Source: Stated  Current BMI (kg/m2): 21.6  Usual Body Weight: 198 lb 9.6 oz (90.1 kg) (12/2021)  % Weight Change (Calculated): -9.4  Weight Adjustment For: No Adjustment                 BMI Categories: Underweight (BMI less than 22) age over 72    Estimated Daily Nutrient Needs:  Energy Requirements Based On: Kcal/kg  Weight Used for Energy Requirements: Current  Energy (kcal/day): 6682-3310 (30-35 kcals/kg)  Weight Used for Protein Requirements: Ideal  Protein (g/day):  (1-1.2 g/kg)  Method Used for Fluid Requirements: 1 ml/kcal  Fluid (ml/day): 1 ml/kcal or fluids per MD    Nutrition Diagnosis:   Moderate malnutrition, In context of chronic illness related to impaired respiratory function as evidenced by localized or generalized fluid accumulation, mild muscle loss, mild loss of subcutaneous fat    Nutrition Interventions:   Food and/or Nutrient Delivery: Start Oral Nutrition Supplement, Continue Current Diet  Nutrition Education/Counseling: Survival skills/brief education completed (heart failure nutrition therapy for undernourished)  Coordination of Nutrition Care: Continue to monitor while inpatient       Goals:     Goals: PO intake 50% or greater       Nutrition Monitoring and Evaluation:   Behavioral-Environmental Outcomes: None Identified  Food/Nutrient Intake Outcomes: Diet Advancement/Tolerance, Supplement Intake, Food and Nutrient Intake  Physical Signs/Symptoms Outcomes: Biochemical Data, Chewing or Swallowing, GI Status, Meal Time Behavior, Nutrition Focused Physical Findings, Weight, Fluid Status or Edema    Discharge Planning:     Too soon to determine     Thomas Lujan RD, LD  Contact: [phone]

## 2023-02-01 NOTE — ED NOTES
ED TO INPATIENT SBAR HANDOFF    Patient Name: Alane Lesches   :  1955  74 y.o. MRN:  [id number]  Preferred Name    ED Room #:  TR04/04TR-04  Family/Caregiver Present no   Restraints no   Sitter no   Sepsis Risk Score Sepsis Risk Score: 4.19    Situation  Code Status: Prior No additional code details. Allergies: Penicillins, Azithromycin, Clozapine [clozapine], Haldol [haloperidol lactate], Insulins, Niacin and related, Ultram [tramadol], Zomig [zolmitriptan], Codeine, Depakote [divalproex sodium], Haldol [haloperidol lactate], Hydrocodone, Ibuprofen, Imitrex [sumatriptan], Maxalt [rizatriptan], and Neurontin [gabapentin]  Weight: Patient Vitals for the past 96 hrs (Last 3 readings):   Weight   23 0145 180 lb (81.6 kg)     Arrived from: nursing home  Chief Complaint:   Chief Complaint   Patient presents with    Shortness of Breath     Started 3 hrs ago     Hospital Problem/Diagnosis:  Active Problems:    * No active hospital problems. *  Resolved Problems:    * No resolved hospital problems. *    Imaging:   XR CHEST PORTABLE   Preliminary Result   1. Diffuse ground-glass airspace opacities concerning for infection over   edema. 2. Cardiomegaly.            Abnormal labs:   Abnormal Labs Reviewed   COMPREHENSIVE METABOLIC PANEL - Abnormal; Notable for the following components:       Result Value    Sodium 131 (*)     Potassium 3.0 (*)     Chloride 96 (*)     CO2 20 (*)     Creatinine 1.4 (*)     Est, Glom Filt Rate 56 (*)     Glucose 187 (*)     All other components within normal limits   CBC WITH AUTO DIFFERENTIAL - Abnormal; Notable for the following components:    RBC 4.04 (*)     Hemoglobin 12.5 (*)     Hematocrit 39.6 (*)     MCHC 31.6 (*)     MPV 11.8 (*)     Segs Relative 74.0 (*)     Lymphocytes % 13.1 (*)     Monocytes % 11.9 (*)     All other components within normal limits   BLOOD GAS, VENOUS - Abnormal; Notable for the following components:    pO2, Gilberto 21 (*)     Base Excess 5 (*)     O2 Sat, Gilberto 43.7 (*)     All other components within normal limits   BRAIN NATRIURETIC PEPTIDE - Abnormal; Notable for the following components:    Pro-BNP 4,675 (*)     All other components within normal limits   LACTATE, SEPSIS - Abnormal; Notable for the following components:    Lactic Acid, Sepsis 3.4 (*)     All other components within normal limits   PROTIME-INR - Abnormal; Notable for the following components:    Protime 56.2 (*)     All other components within normal limits     Critical values: yes, potassium 3.0, lactic 3.4     Abnormal Assessment Findings: shortness of breath    Background  History:   Past Medical History:   Diagnosis Date    [facility]     on Coumadin    Abdominal pain 3/22/2012    Acute on chronic renal failure (HCC)     Allergic rhinitis     Bipolar affective disorder unspecified     possible schziophrenia per pt- Seeing Dr. Yuniel Valdez of left shoulder 2007    s/p injection     CAD (coronary artery disease)     see Leonidas    Cardiomyopathy ([facility]     NYHA FC II-III    CHF (NYHA class III, ACC/AHA stage C) ([facility] 4/27/2012    Cholelithiasis 5/8/2012    per CT    Chronic kidney disease (CKD)     Chronic pain     on Ultram- dispensed at [facility] per CSP/psych program    Depression     Displacement of electrode lead of cardiac pacemaker 10/4/2019    10/4/2019 RV lead got pulled back in the connector    DJD (degenerative joint disease) of cervical spine     Elevated serum creatinine     Erectile dysfunction     Fractures     Left collar bone x8, left wrist, bilateral toes    GERD (gastroesophageal reflux disease)     GERD (gastroesophageal reflux disease)     H/O echocardiogram 03/26/15    EF 20% Mod dilated LV with severly reduced systolic function. pacer wire noted in right ventricle and right atrium.      Hiatal hernia 5/8/2012    per CT    History of nuclear stress test 05/01/2017    lexiscan-large anteroapical MI, no new changes, EF16%    Hypertension     Hypothyroid Started by Dr. Monica Flowers     Ischemic heart disease     Lipidemia 4/27/2012    Lithium toxicity     4/2015    MI, old 4/27/2012    Paronychia 3/12/2012    S/P cardiac cath 2/21/14    Schizoaffective disorder [facility])     old chart also gives hx of paranoid schizo    Unintentional weight loss 3/22/2012    Possible due to bipolar/tabby/ paranoia (sept 2012) with refusing to eat food from the grocery store believing it's being poisoned        Assessment    Vitals/MEWS: MEWS Score: 1  Level of Consciousness: Alert (0)   Vitals:    02/01/23 0036 02/01/23 0052 02/01/23 0145 02/01/23 0248   BP:  103/61  116/67   Pulse: 95   75   Resp: 25   20   Temp:    98 °F (36.7 °C)   TempSrc:    Axillary   SpO2: 97%   100%   Weight:   180 lb (81.6 kg)    Height:   6' 4.5\" (1.943 m)      FiO2 (%): bipap  O2 Flow Rate: O2 Device: PAP (positive airway pressure) O2 Flow Rate (L/min): 6 L/min  Cardiac Rhythm:   Pain Assessment:  [] Verbal [] Zygmunt Passer Scale  Pain Scale:    Last documented pain score (0-10 scale)    Last documented pain medication administered:   Mental Status: oriented, alert, coherent, logical, thought processes intact, and able to concentrate and follow conversation  NIH Score: NIH     C-SSRS: Risk of Suicide: No Risk  Bedside swallow:    [place] Coma Scale (GCS): [place] Coma Scale  Eye Opening: Spontaneous  Best Verbal Response: Oriented  Best Motor Response: Obeys commands  [place]ella Coma Scale Score: 15  Active LDA's:   Peripheral IV 02/01/23 Left Antecubital (Active)   Site Assessment Clean, dry & intact 02/01/23 0023   Line Status Blood return noted; Flushed;Normal saline locked 02/01/23 0023   Phlebitis Assessment No symptoms 02/01/23 0023   Infiltration Assessment 0 02/01/23 0023   Dressing Status Clean, dry & intact 02/01/23 0023     PO Status: Regular  Pertinent or High Risk Medications/Drips: no   If Yes, please provide details:   Pending Blood Product Administration: no     You may also review the ED PT Care Timeline found under the Summary Nursing Index tab. Recommendation    Pending orders   Plan for Discharge (if known):    Additional Comments:    If any further questions, please call Sending RN at [phone]    Electronically signed by: Electronically signed by Marlen Reyes RN on 2/1/2023 at 2:49 AM      Marlen Reyes RN  02/01/23 9189

## 2023-02-01 NOTE — PROGRESS NOTES
4 Eyes Skin Assessment     NAME:  Joy Rodriguez  YOB: 1955  MEDICAL RECORD NUMBER:  [id number]    The patient is being assessed for  Admission    I agree that One RN have performed a thorough Head to Toe Skin Assessment on the patient. ALL assessment sites listed below have been assessed. Areas assessed by both nurses:    Head, Face, Ears, Shoulders, Back, Chest, Arms, Elbows, Hands, Sacrum. Buttock, Coccyx, Ischium, and Legs. Feet and Heels        Does the Patient have a Wound?  Other right foot abrasion; redness on coccyx       [place] Prevention initiated by RN: No   Wound Care Orders initiated by RN: No    Pressure Injury (Stage 3,4, Unstageable, DTI, NWPT, and Complex wounds) if present place referral order by RN under : No    New and Established Ostomies, if present place, referral order under : No      Nurse 1 eSignature: Electronically signed by Jyothi Huff RN on 2/1/23 at 4:38 PM EST    **SHARE this note so that the co-signing nurse is able to place an eSignature**    Nurse 2 eSignature: {Esignature:[id number]}

## 2023-02-01 NOTE — PROGRESS NOTES
PHARMACY ANTICOAGULATION MONITORING SERVICE    Aviva Cabrera is a 74 y.o. male on warfarin therapy for PAF. Pharmacy consulted by Dr. Tarun Jalloh for monitoring and adjustment of treatment. Indication for anticoagulation: PAF  INR goal: 2 - 3  Warfarin dose prior to admission: 1.5 mg po daily    Pertinent Laboratory Values   Recent Labs     02/01/23  0022   INR 4.29   HGB 12.5*   HCT 39.6*          Assessment/Plan:  Drug Interactions:   Amiodarone (Home med)  Aspirin (Home med)  Levothyroxine (Home med)  Acetaminophen (PRN)  INR supratherapeutic on admission at 4.29  Will hold warfarin doses at this time; Will trend INR daily and resume therapy when INR falls into therapeutic range. Pharmacy will continue to monitor and adjust warfarin therapy as indicated    Thank you for the consult.   Nela Lee [facility]  2/1/2023 6:57 AM

## 2023-02-01 NOTE — H&P
V2.0  History and Physical      Name:  Julia Young /Age/Sex: 1955  (74 y.o. male)   MRN & CSN:  [id number] & [id number] Encounter Date/Time: 2023 3:26 AM EST   Location:  [place] [address] PCP: Marisela Huffman MD       Hospital Day: 1    Assessment and Plan:   Julia Young is a 74 y.o. male with a PMHx of paroxysmal atrial fibrillation, hypothyroidism, coronary artery disease status post PCI, ischemic cardiomyopathy, systolic heart failure status post biventricular AICD, hypertension, hyperlipidemia, tobacco use presented with progressively worsening shortness of breath. Acute hypoxic respiratory failure  Secondary to acute on chronic systolic/diastolic heart failure  Ischemic cardiomyopathy status post AICD  Evidenced by fluid overload on clinical exam and chest x-ray, proBNP 4675  Required BiPAP due to increased work of breathing on arrival, transition to high flow nasal cannula. Echocardiogram 2021: Ejection fraction 10%, severely dilated left ventricle, grade 3 diastolic dysfunction, mild to moderate MR. Started on IV Lasix 40 mg twice daily, unclear of home regimen due to no medication list from nursing home. Monitor intake and output  Daily weights  Low-sodium diet  Cardiology consulted  Repeat echocardiogram    Hypokalemia  Oral and IV replacement for a total of 50 mEq given in ER  Will give another 40 mEq PO  Repeat BMP with magnesium    Lactic acidosis  Likely type II in the setting of hypoxia  Trending down    CKD stage III  Baseline creatinine 1.3-1.4  Creatinine appears to be at baseline  Monitor renal function parameters  Avoid nephrotoxic agents    Paroxysmal atrial fibrillation  Long-term use of Coumadin  Supratherapeutic INR  INR 4.29  Continue Toprol and amiodarone  PT/INR in a.m.   Pharmacy to dose Coumadin    Coronary artery disease status post LAD PCI in  at [facility]  Continue aspirin and statin    Hypothyroidism  Continue Synthroid home regimen    Hypertension  Acceptable blood pressure trend  Continue Toprol    Hyperlipidemia  Continue statin    Tobacco abuse   regarding cessation    Disposition:   Current Living situation: Assisted living facility  Expected Disposition: Assisted living facility  Estimated D/C: 2-3 days    Diet No diet orders on file   DVT Prophylaxis Coumadin   Code Status Prior   Surrogate Decision Maker/ AYAZ Gunter     History from:     patient    History of Present Illness:     Chief Complaint: Heart failure ([facility] [address]    Jim Bence is a 74 y.o. male with a PMHx of paroxysmal atrial fibrillation, hypothyroidism, coronary artery disease status post PCI, ischemic cardiomyopathy, systolic heart failure status post biventricular AICD, hypertension, hyperlipidemia, tobacco use presented with progressively worsening shortness of breath. Patient states that over the past 3 days he has been having progressively worsening shortness of breath, denies any cough or sputum production. Also endorses orthopnea, PND and worsening dyspnea on exertion. Patient denies chest pain, syncopal episode, changes in bowel bladder habit. Patient also denies fever, chills or night sweats. Review of Systems: Need 10 Elements   Review of Systems   Constitutional:  Positive for fatigue. Negative for appetite change, chills, fever and unexpected weight change. HENT:  Negative for ear pain, hearing loss, sinus pressure, sinus pain and voice change. Eyes:  Negative for pain, discharge and visual disturbance. Respiratory:  Positive for shortness of breath. Negative for cough and chest tightness. Cardiovascular:  Negative for chest pain, palpitations and leg swelling. Gastrointestinal:  Negative for abdominal pain, blood in stool, diarrhea, nausea and vomiting. Genitourinary:  Negative for dysuria and frequency. Musculoskeletal:  Negative for arthralgias and back pain.    Neurological:  Negative for dizziness, weakness, light-headedness and headaches. Psychiatric/Behavioral:  Negative for sleep disturbance. Objective:   No intake or output data in the 24 hours ending 02/01/23 0456   Vitals:   Vitals:    02/01/23 0358 02/01/23 0401 02/01/23 0415 02/01/23 0423   BP:       Pulse: 99 94 88 87   Resp: 26 29 19 26   Temp:       TempSrc:       SpO2: 90% (!) 87% (!) 85% 90%   Weight:       Height:           Medications Prior to Admission     Prior to Admission medications    Medication Sig Start Date End Date Taking? Authorizing Provider   amiodarone (CORDARONE) 200 MG tablet Take 1 tablet by mouth daily 12/16/21   Sherron Win MD   traMADol (ULTRAM) 50 MG tablet Take 1 tablet by mouth every 6 hours as needed for Pain Moderate (4-6). 7/1/21   Historical Provider, MD   digoxin (LANOXIN) 125 MCG tablet Take 1 tablet by mouth daily 2 tab every 12 hours x 48 hours then one daily. 9/15/21   Sherron Win MD   metoprolol succinate (TOPROL XL) 25 MG extended release tablet Take 1 tablet by mouth daily 9/15/21   Anjelica Lux MD   HYDROcodone-acetaminophen (NORCO) 5-325 MG per tablet 1 tablet 3 times daily as needed.  3/5/20   Historical Provider, MD   ziprasidone (GEODON) 40 MG capsule 1 capsule 2 times daily 2/19/20   Historical Provider, MD   diltiazem (CARDIZEM) 30 MG tablet Take 1 tablet by mouth every 8 hours  Patient taking differently: Take 45 mg by mouth every 8 hours Pt takes 1.5 tablets daily 10/4/19   Althea Gonzalez MD   warfarin (COUMADIN) 2.5 MG tablet Take 2 mg by mouth Daily with supper     Historical Provider, MD   acetaminophen (TYLENOL) 325 MG tablet Take 650 mg by mouth every 4 hours as needed for Pain    Historical Provider, MD   ezetimibe (ZETIA) 10 MG tablet Take 10 mg by mouth daily    Historical Provider, MD   atorvastatin (LIPITOR) 80 MG tablet Take 1 tablet by mouth daily 4/18/17   Elizabeth Strong MD   nitroGLYCERIN (NITROSTAT) 0.3 MG SL tablet Place 0.3 mg under the tongue every 5 minutes as needed for Chest pain    Historical Provider, MD   levothyroxine (SYNTHROID) 75 MCG tablet Take 75 mcg by mouth Daily. Historical Provider, MD   benztropine (COGENTIN) 1 MG tablet Take 1 mg by mouth 2 times daily     Historical Provider, MD   loratadine (CLARITIN) 10 MG tablet Take 1 tablet by mouth daily. 4/23/13   Mao Duarte MD       Physical Exam: Need 8 Elements   Physical Exam  Vitals reviewed. Constitutional:       Appearance: Normal appearance. He is normal weight. HENT:      Head: Normocephalic and atraumatic. Nose: Nose normal.      Mouth/Throat:      Mouth: Mucous membranes are dry. Pharynx: Oropharynx is clear. Eyes:      General: No scleral icterus. Conjunctiva/sclera: Conjunctivae normal.   Cardiovascular:      Rate and Rhythm: Normal rate and regular rhythm. Pulses: Normal pulses. Heart sounds: Murmur heard. Pulmonary:      Effort: Pulmonary effort is normal.      Breath sounds: Rales present. No wheezing or rhonchi. Abdominal:      General: Bowel sounds are normal. There is no distension. Palpations: Abdomen is soft. Tenderness: There is no abdominal tenderness. Musculoskeletal:         General: No deformity. Normal range of motion. Cervical back: Neck supple. No rigidity. Right lower leg: Edema present. Left lower leg: Edema present. Skin:     Coloration: Skin is not jaundiced or pale. Neurological:      General: No focal deficit present. Mental Status: He is alert and oriented to person, place, and time. Mental status is at baseline.           Past History:   PMHx   Past Medical History:   Diagnosis Date    [facility]     on Coumadin    Abdominal pain 3/22/2012    Acute on chronic renal failure (HCC)     Allergic rhinitis     Bipolar affective disorder unspecified     possible schziophrenia per pt- Seeing Dr. Chelo Christie of left shoulder 2007    s/p injection     CAD (coronary artery disease)     see Jeff Reyes Cardiomyopathy ([facility] [address]     NYHA FC II-III    CHF (NYHA class III, ACC/AHA stage C) ([facility] [address] 4/27/2012    Cholelithiasis 5/8/2012    per CT    Chronic kidney disease (CKD)     Chronic pain     on Ultram- dispensed at [address] per CSP/psych program    Depression     Displacement of electrode lead of cardiac pacemaker 10/4/2019    10/4/2019 RV lead got pulled back in the connector    DJD (degenerative joint disease) of cervical spine     Elevated serum creatinine     Erectile dysfunction     Fractures     Left collar bone x8, left wrist, bilateral toes    GERD (gastroesophageal reflux disease)     GERD (gastroesophageal reflux disease)     H/O echocardiogram 03/26/15    EF 20% Mod dilated LV with severly reduced systolic function. pacer wire noted in right ventricle and right atrium. Hiatal hernia 5/8/2012    per CT    History of nuclear stress test 05/01/2017    lexiscan-large anteroapical MI, no new changes, EF16%    Hypertension     Hypothyroid     Started by Dr. Rosa Chatterjee     Ischemic heart disease     Lipidemia 4/27/2012    Lithium toxicity     4/2015    MI, old 4/27/2012    Paronychia 3/12/2012    S/P cardiac cath 2/21/14    Schizoaffective disorder [facility])     old chart also gives hx of paranoid schizo    Unintentional weight loss 3/22/2012    Possible due to bipolar/tabby/ paranoia (sept 2012) with refusing to eat food from the grocery store believing it's being poisoned      PSHX:  has a past surgical history that includes sinus surgery; Tonsillectomy; Cardiac surgery; Cardiac catheterization; angioplasty; pacemaker placement; Endoscopy, colon, diagnostic (9/2015); Gastric fundoplication (02/14/0527); hiatal hernia repair (10/7/15); and Cholecystectomy. Fam HX: family history includes Mental Illness in his mother, sister, and sister.     Soc HX:   Social History     Socioeconomic History    Marital status:    Tobacco Use    Smoking status: Every Day     Packs/day: 0.50     Years: 46.00     Pack years: 23.00     Types: Cigarettes    Smokeless tobacco: Never   Vaping Use    Vaping Use: Never used   Substance and Sexual Activity    Alcohol use: No     Alcohol/week: 0.0 standard drinks    Drug use: No    Sexual activity: Not Currently       Allergies: Allergies:    Allergies   Allergen Reactions    Penicillins Swelling    Azithromycin     Clozapine [Clozapine] Swelling    Haldol [Haloperidol Lactate]     Insulins      Per ecf    Niacin And Related     Ultram [Tramadol]     Zomig [Zolmitriptan]     Codeine Nausea And Vomiting    Depakote [Divalproex Sodium] Other (See Comments)     ' makes eyeball fall out\"    Haldol [Haloperidol Lactate]      Hand and leg tremors with palpitation    Hydrocodone Nausea And Vomiting    Ibuprofen Anxiety     Causes pt to pass out    Imitrex [Sumatriptan] Anxiety    Maxalt [Rizatriptan] Nausea And Vomiting     Makes HA worse    Neurontin [Gabapentin] Other (See Comments)     \" makes eyeballs shrink\"       Medications:     Orders Placed This Encounter   Medications    ipratropium-albuterol (DUONEB) nebulizer solution 1 ampule     Order Specific Question:   Initiate RT Bronchodilator Protocol     Answer:   Yes - ED Protocol    ipratropium-albuterol (DUONEB) nebulizer solution 1 ampule     Order Specific Question:   Initiate RT Bronchodilator Protocol     Answer:   Yes - ED Protocol    methylPREDNISolone sodium (SOLU-MEDROL) injection 40 mg    furosemide (LASIX) injection 40 mg    cefepime (MAXIPIME) 2,000 mg in sodium chloride 0.9 % 50 mL IVPB (mini-bag)     Order Specific Question:   Antimicrobial Indications     Answer:   Pneumonia (CAP)    metronidazole (FLAGYL) 500 mg in 0.9% NaCl 100 mL IVPB premix     Order Specific Question:   Antimicrobial Indications     Answer:   Pneumonia (CAP)    0.9 % sodium chloride bolus    DISCONTD: vancomycin (VANCOCIN) 2,000 mg in sodium chloride 0.9 % 500 mL IVPB     Order Specific Question:   Antimicrobial Indications     Answer:   Sepsis of Unknown Etiology    potassium chloride 10 mEq/100 mL IVPB (Peripheral Line)    potassium bicarb-citric acid (EFFER-K) effervescent tablet 40 mEq    potassium chloride (KLOR-CON M) extended release tablet 40 mEq         Labs      CBC:   Recent Labs     02/01/23 0022   WBC 8.8   HGB 12.5*        BMP:    Recent Labs     02/01/23 0022   *   K 3.0*   CL 96*   CO2 20*   BUN 17   CREATININE 1.4*   GLUCOSE 187*     Hepatic:   Recent Labs     02/01/23 0022   AST 21   ALT 20   BILITOT 0.6   ALKPHOS 80     Lipids:   Lab Results   Component Value Date/Time    CHOL 197 11/23/2021 07:15 AM    CHOL 152 02/20/2020 12:00 AM    HDL 34 11/23/2021 07:15 AM    TRIG 107 11/23/2021 07:15 AM     Hemoglobin A1C: No results found for: LABA1C  TSH:   Lab Results   Component Value Date/Time    TSH 1.664 03/09/2018 12:00 AM     Troponin:   Lab Results   Component Value Date/Time    TROPONINT <0.010 02/01/2023 12:22 AM    TROPONINT <0.010 05/21/2022 01:05 AM    TROPONINT <0.010 02/14/2022 04:58 AM     Lactic Acid: No results for input(s): LACTA in the last 72 hours.   BNP:   Recent Labs     02/01/23 0022   PROBNP 4,675*     UA:  Lab Results   Component Value Date/Time    NITRU NEGATIVE 02/01/2023 02:31 AM    COLORU YELLOW 02/01/2023 02:31 AM    WBCUA 2 10/01/2019 05:07 PM    RBCUA NONE SEEN 10/01/2019 05:07 PM    MUCUS RARE 10/28/2016 04:40 PM    TRICHOMONAS NONE SEEN 10/01/2019 05:07 PM    BACTERIA NEGATIVE 10/01/2019 05:07 PM    CLARITYU CLEAR 02/01/2023 02:31 AM    [address] <1.005 02/01/2023 02:31 AM    LEUKOCYTESUR NEGATIVE 02/01/2023 02:31 AM    UROBILINOGEN 0.2 02/01/2023 02:31 AM    BILIRUBINUR NEGATIVE 02/01/2023 02:31 AM    BLOODU NEGATIVE 02/01/2023 02:31 AM    KETUA NEGATIVE 02/01/2023 02:31 AM     Urine Cultures: No results found for: LABURIN  Blood Cultures: No results found for: BC  No results found for: BLOODCULT2  Organism: No results found for: ORG    Imaging/Diagnostics Last 24 Hours   XR CHEST PORTABLE    Result Date: 2/1/2023  1. Diffuse ground-glass airspace opacities concerning for infection over edema. 2. Cardiomegaly. Personally reviewed Lab Studies, Imaging.     Electronically signed by Lan Hartman MD on 2/1/2023 at 4:56 AM

## 2023-02-01 NOTE — CONSULTS
CARDIOLOGY CONSULT NOTE   Reason for consultation:  shortness of breath    Referring physician:  Sakshi Ward MD     Primary care physician: [facility] [employer], MD      Dear   Thanks for the consult. History of present illness:Alfredito is a 74 y. o.year old who  presents with for shortness of breath which is severe for few days intermittent, self limiting, not associated with cough or fever, gets worse with activity and better with rest,    Chief Complaint   Patient presents with    Shortness of Breath     Started 3 hrs ago     Blood pressure, cholesterol, blood glucose and weight are well controlled. 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 Past surgical history:   has a past surgical history that includes sinus surgery; Tonsillectomy; Cardiac surgery; Cardiac catheterization; angioplasty; pacemaker placement; Endoscopy, colon, diagnostic (9/2015); Gastric fundoplication (83/76/3808); hiatal hernia repair (10/7/15); and Cholecystectomy. Social History:   reports that he has been smoking cigarettes. He has a 23.00 pack-year smoking history.  He has never used smokeless tobacco. He reports that he does not drink alcohol and does not use drugs.   Family history:   no family history of CAD, STROKE of DM    Allergies   Allergen Reactions    Penicillins Swelling    Azithromycin     Clozapine [Clozapine] Swelling    Haldol [Haloperidol Lactate]     Insulins      Per ecf    Niacin And Related     Ultram [Tramadol]     Zomig [Zolmitriptan]     Codeine Nausea And Vomiting    Depakote [Divalproex Sodium] Other (See Comments)     ' makes eyeball fall out\"    Haldol [Haloperidol Lactate]      Hand and leg tremors with palpitation    Hydrocodone Nausea And Vomiting    Ibuprofen Anxiety     Causes pt to pass out    Imitrex [Sumatriptan] Anxiety    Maxalt [Rizatriptan] Nausea And Vomiting     Makes HA worse    Neurontin [Gabapentin] Other (See Comments)     \" makes eyeballs shrink\"       sodium chloride flush 0.9 % injection 5-40 mL, 2 times per day  sodium chloride flush 0.9 % injection 5-40 mL, PRN  0.9 % sodium chloride infusion, PRN  polyethylene glycol (GLYCOLAX) packet 17 g, Daily PRN  acetaminophen (TYLENOL) tablet 650 mg, Q6H PRN   Or  acetaminophen (TYLENOL) suppository 650 mg, Q6H PRN  potassium chloride (KLOR-CON M) extended release tablet 40 mEq, PRN   Or  potassium bicarb-citric acid (EFFER-K) effervescent tablet 40 mEq, PRN   Or  potassium chloride 10 mEq/100 mL IVPB (Peripheral Line), PRN  magnesium sulfate 2000 mg in 50 mL IVPB premix, PRN  furosemide (LASIX) injection 40 mg, BID  amiodarone (CORDARONE) tablet 200 mg, Daily  atorvastatin (LIPITOR) tablet 80 mg, Daily  levothyroxine (SYNTHROID) tablet 75 mcg, Daily  metoprolol succinate (TOPROL XL) extended release tablet 25 mg, Daily  aspirin chewable tablet 81 mg, Daily  warfarin placeholder: dosing by pharmacy, RX Placeholder      Current Facility-Administered Medications   Medication Dose Route Frequency Provider Last Rate Last Admin    sodium chloride flush 0.9 % injection 5-40 mL  5-40 mL IntraVENous 2 times per day Olga Lidia Silva MD        sodium chloride flush 0.9 % injection 5-40 mL  5-40 mL IntraVENous PRN Guido Wilson MD        0.9 % sodium chloride infusion   IntraVENous PRN Guido Wilson MD        polyethylene glycol (GLYCOLAX) packet 17 g  17 g Oral Daily PRN Guido Wilson MD        acetaminophen (TYLENOL) tablet 650 mg  650 mg Oral Q6H PRN Guido Wilson MD        Or    acetaminophen (TYLENOL) suppository 650 mg  650 mg Rectal Q6H PRN Guido Wilson MD        potassium chloride (KLOR-CON M) extended release tablet 40 mEq  40 mEq Oral PRN Guido Wilson MD        Or    potassium bicarb-citric acid (EFFER-K) effervescent tablet 40 mEq  40 mEq Oral PRN Guido Wilson MD        Or    potassium chloride 10 mEq/100 mL IVPB (Peripheral Line)  10 mEq IntraVENous PRN Guido Wilson MD        magnesium sulfate 2000 mg in 50 mL IVPB premix  2,000 mg IntraVENous PRN Guido Wilson MD        furosemide (LASIX) injection 40 mg  40 mg IntraVENous BID Guido Wilson MD        amiodarone (CORDARONE) tablet 200 mg  200 mg Oral Daily Guido Wilson MD        atorvastatin (LIPITOR) tablet 80 mg  80 mg Oral Daily Guido Wilson MD        levothyroxine (SYNTHROID) tablet 75 mcg  75 mcg Oral Daily Guido Wilson MD   75 mcg at 02/01/23 1389    metoprolol succinate (TOPROL XL) extended release tablet 25 mg  25 mg Oral Daily Guido Wilson MD        aspirin chewable tablet 81 mg  81 mg Oral Daily Guido Wilson MD        warfarin placeholder: dosing by pharmacy   Other [address], MD         Current Outpatient Medications   Medication Sig Dispense Refill    amiodarone (CORDARONE) 200 MG tablet Take 1 tablet by mouth daily 90 tablet 3    traMADol (ULTRAM) 50 MG tablet Take 1 tablet by mouth every 6 hours as needed for Pain Moderate (4-6). digoxin (LANOXIN) 125 MCG tablet Take 1 tablet by mouth daily 2 tab every 12 hours x 48 hours then one daily.  30 tablet 3    metoprolol succinate (TOPROL XL) 25 MG extended release tablet Take 1 tablet by mouth daily 30 tablet 3    HYDROcodone-acetaminophen (NORCO) 5-325 MG per tablet 1 tablet 3 times daily as needed. ziprasidone (GEODON) 40 MG capsule 1 capsule 2 times daily      diltiazem (CARDIZEM) 30 MG tablet Take 1 tablet by mouth every 8 hours (Patient taking differently: Take 45 mg by mouth every 8 hours Pt takes 1.5 tablets daily) 120 tablet 3    warfarin (COUMADIN) 2.5 MG tablet Take 2 mg by mouth Daily with supper       acetaminophen (TYLENOL) 325 MG tablet Take 650 mg by mouth every 4 hours as needed for Pain      ezetimibe (ZETIA) 10 MG tablet Take 10 mg by mouth daily      atorvastatin (LIPITOR) 80 MG tablet Take 1 tablet by mouth daily 90 tablet 3    nitroGLYCERIN (NITROSTAT) 0.3 MG SL tablet Place 0.3 mg under the tongue every 5 minutes as needed for Chest pain      levothyroxine (SYNTHROID) 75 MCG tablet Take 75 mcg by mouth Daily. benztropine (COGENTIN) 1 MG tablet Take 1 mg by mouth 2 times daily       loratadine (CLARITIN) 10 MG tablet Take 1 tablet by mouth daily. 90 tablet 3     Review of Systems:   Constitutional: No Fever or Weight Loss   Eyes: No Decreased Vision  ENT: No Headaches, Hearing Loss or Vertigo  Cardiovascular: No chest pain, dyspnea on exertion, palpitations or loss of consciousness  Respiratory: No cough or wheezing    Gastrointestinal: No abdominal pain, appetite loss, blood in stools, constipation, diarrhea or heartburn  Genitourinary: No dysuria, trouble voiding, or hematuria  Musculoskeletal:  No gait disturbance, weakness or joint complaints  Integumentary: No rash or pruritis  Neurological: No TIA or stroke symptoms  Psychiatric: No anxiety or depression  Endocrine: No malaise, fatigue or temperature intolerance  Hematologic/Lymphatic: No bleeding problems, blood clots or swollen lymph nodes  Allergic/Immunologic: No nasal congestion or hives  All systems negative except as marked.             Physical Examination:    Vitals:    02/01/23 0735   BP: 10/60   Pulse: 71   Resp: 15   Temp: AFEBRILE   SpO2:       Wt Readings from Last 3 Encounters:   02/01/23 180 lb (81.6 kg)   12/16/21 198 lb 9.6 oz (90.1 kg)   11/23/21 196 lb (88.9 kg)     Body mass index is 21.62 kg/m². General Appearance:  No distress, conversant    Constitutional:  Well developed, Well nourished, No acute distress, Non-toxic appearance. HENT:  Normocephalic, Atraumatic, Bilateral external ears normal, Oropharynx moist, No oral exudates, Nose normal. Neck- Normal range of motion, No tenderness, Supple, No stridor,no apical-carotid delay, no carotid bruit  Eyes:  PERRL, EOMI, Conjunctiva normal, No discharge. Respiratory:  Normal breath sounds, No respiratory distress, No wheezing, No chest tenderness. ,no use of accessory muscles, diaphragm movement is normal  Cardiovascular: (PMI) apex non displaced,no lifts no thrills, no s3,no s4, Normal heart rate, Normal rhythm, No murmurs, No rubs, No gallops. Carotid arteries pulse and amplitude are normal no bruit, no abdominal bruit noted ( normal abdominal aorta ausculation), femoral arteries pulse and amplitude are normal no bruit, pedal pulses are normal  GI:  Bowel sounds normal, Soft, No tenderness, No masses, No pulsatile masses, no hepatosplenomegally, no bruits  : External genitalia appear normal, No masses or lesions. No discharge. No CVA tenderness. Musculoskeletal:  Intact distal pulses, No edema, No tenderness, No cyanosis, No clubbing. Good range of motion in all major joints. No tenderness to palpation or major deformities noted. Back- No tenderness. Integument:  Warm, Dry, No erythema, No rash. Skin: no rash, no ulcers  Lymphatic:  No lymphadenopathy noted. Neurologic:  Alert & oriented x 3, Normal motor function, Normal sensory function, No focal deficits noted.    Psychiatric:  Affect normal, Judgment normal, Mood normal.   Lab Review   Recent Labs     02/01/23  0022   WBC 8.8   HGB 12.5*   HCT 39.6*    Recent Labs     02/01/23  0635   *   K 4.3   CL 99   CO2 24   BUN 18   CREATININE 1.5*     Recent Labs     02/01/23  0022   AST 21   ALT 20   BILITOT 0.6   ALKPHOS 80     No results for input(s): TROPONINI in the last 72 hours. Lab Results   Component Value Date    BNP 73 03/08/2012     (H) 02/13/2012    BNP 84 05/25/2011     Lab Results   Component Value Date    INR 4.29 02/01/2023    PROTIME 56.2 (H) 02/01/2023         EKG:NSR. LBBB    Chest Xray:  1. Diffuse ground-glass airspace opacities concerning for infection over   edema. ECHO:lvef 18%  Labs, echo, meds reviewed  Assessment: 74 y. o.year old with PMH of  has a past medical history of A-fib ([facility] Abdominal pain, Acute on chronic renal failure (HCC), Allergic rhinitis, Bipolar affective disorder unspecified, Bursitis of left shoulder, CAD (coronary artery disease), Cardiomyopathy ([address] CHF (NYHA class III, ACC/AHA stage C) ([facility] [facility] Cholelithiasis, Chronic kidney disease (CKD), Chronic pain, Depression, Displacement of electrode lead of cardiac pacemaker, DJD (degenerative joint disease) of cervical spine, Elevated serum creatinine, Erectile dysfunction, Fractures, GERD (gastroesophageal reflux disease), GERD (gastroesophageal reflux disease), H/O echocardiogram, Hiatal hernia, History of nuclear stress test, Hypertension, Hypothyroid, Ischemic heart disease, Lipidemia, Lithium toxicity, MI, old, Paronychia, S/P cardiac cath, Schizoaffective disorder ([address] and Unintentional weight loss.       Recommendations:    sHORETNESS OF BREATH: from CHF, start IV lasix drip  PAF patient has declined A. fib ablation in the past continue Coumadin and milrinone  Severe LV dysfunction with history of CAD and had LAD stent in 2013 LVEF is severely depressed has ICD continue metoprolol, WILL RECOMMEND TO GET ENTERSTO  Health maintenance: exerise and diet  All labs, medications and tests reviewed, continue all other medications of all above medical condition listed as is.          Ney Abbott MD, 2/1/2023 7:53 AM

## 2023-02-01 NOTE — PLAN OF CARE
Problem: Discharge Planning  Goal: Discharge to home or other facility with appropriate resources  Outcome: Progressing  Flowsheets (Taken 2/1/2023 8852)  Discharge to home or other facility with appropriate resources:   Identify barriers to discharge with patient and caregiver   Arrange for needed discharge resources and transportation as appropriate   Identify discharge learning needs (meds, wound care, etc)   Arrange for interpreters to assist at discharge as needed   Refer to discharge planning if patient needs post-hospital services based on physician order or complex needs related to functional status, cognitive ability or social support system

## 2023-02-01 NOTE — PROGRESS NOTES
02/01/23 1623   Encounter Summary   Encounter Overview/Reason  Initial Encounter   Service Provided For: Patient   Referral/Consult From: [facility]   Last Encounter  02/01/23  [facility]: Patient had chest pain. Says it is subsiding but he is kept for obseration. He had no spiritual needs)   Complexity of Encounter Low   Begin Time 1615   End Time  1620   Total Time Calculated 5 min   Encounter    Type Initial Screen/Assessment   Spiritual/Emotional needs   Type Spiritual Support   Assessment/Intervention/Outcome   Assessment Calm; Hopeful   Intervention Active listening;Nurtured Hope   Outcome Coping;Expressed Gratitude   Plan and Referrals   Plan/Referrals Continue to visit, (comment)

## 2023-02-01 NOTE — PROGRESS NOTES
[address] [place] [address]    Pasquale Salcido is a 74 y.o. male started on vancomycin for sepsis. Pharmacy consulted by ED provider Dr Tripp Montoya to order a dose of vancomycin in the emergency department. Other antimicrobials: Cefepime, Flagyl    Ht Readings from Last 1 Encounters:   02/01/23 6' 4.5\" (1.943 m)     Wt Readings from Last 3 Encounters:   02/01/23 180 lb (81.6 kg)   12/16/21 198 lb 9.6 oz (90.1 kg)   11/23/21 196 lb (88.9 kg)        Pertinent Laboratory Values:   Temp Readings from Last 3 Encounters:   02/14/22 98.2 °F (36.8 °C) (Oral)   11/23/21 98.9 °F (37.2 °C) (Oral)   01/15/20 98.3 °F (36.8 °C) (Oral)     Recent Labs     02/01/23  0022   WBC 8.8     Recent Labs     02/01/23  0022   BUN 17   CREATININE 1.4*     Estimated Creatinine Clearance: 61 mL/min (A) (based on SCr of 1.4 mg/dL (H)). No intake or output data in the 24 hours ending 02/01/23 0152    Assessment/Plan:  Pharmacy will order vancomycin 2,000 mg (25 mg/kg). Please note, pharmacy will order a one-time dose of vancomycin for the Emergency Department. The consult will need to be re-ordered if vancomycin is to continue upon admission. Thank you for the consult.   Laura Rogel, [address] [place] [address]  2/1/2023 1:52 AM

## 2023-02-01 NOTE — PROGRESS NOTES
Pt not tolerating heated hi flow nc. Placed back on BIPAP with SATs increasing to 90s. Will monitor.

## 2023-02-02 NOTE — PROGRESS NOTES
Today's plan: Start milrinone drip, continue Lasix drip, increase midodrine to 10 mg 3 times daily repeat chest x-ray and proBNP level      Admit Date:  2/1/2023    Subjective: Some congestion in the chest      Chief complaints on admission  Chief Complaint   Patient presents with    Shortness of Breath     Started 3 hrs ago         History of present illness:Alfredito is a 74 y. o.year old who  presents with had concerns including Shortness of Breath (Started 3 hrs ago). Past medical history:    has a past medical history of A-fib ([address] Abdominal pain, Acute on chronic renal failure ([address] Allergic rhinitis, Bipolar affective disorder unspecified, Bursitis of left shoulder, CAD (coronary artery disease), Cardiomyopathy ([address] CHF (NYHA class III, ACC/AHA stage C) ([address] Cholelithiasis, Chronic kidney disease (CKD), Chronic pain, Depression, Displacement of electrode lead of cardiac pacemaker, DJD (degenerative joint disease) of cervical spine, Elevated serum creatinine, Erectile dysfunction, Fractures, GERD (gastroesophageal reflux disease), GERD (gastroesophageal reflux disease), H/O echocardiogram, Hiatal hernia, History of nuclear stress test, Hypertension, Hypothyroid, Ischemic heart disease, Lipidemia, Lithium toxicity, MI, old, Paronychia, S/P cardiac cath, Schizoaffective disorder ([address] and Unintentional weight loss. Past surgical history:   has a past surgical history that includes sinus surgery; Tonsillectomy; Cardiac surgery; Cardiac catheterization; angioplasty; pacemaker placement; Endoscopy, colon, diagnostic (9/2015); Gastric fundoplication (87/25/4896); hiatal hernia repair (10/7/15); and Cholecystectomy. Social History:   reports that he has been smoking cigarettes. He has a 23.00 pack-year smoking history. He has never used smokeless tobacco. He reports that he does not drink alcohol and does not use drugs.   Family history:  family history includes Mental Illness in his mother, sister, and sister. Allergies   Allergen Reactions    Penicillins Swelling    Azithromycin     Clozapine [Clozapine] Swelling    Haldol [Haloperidol Lactate]     Insulins      Per ecf    Niacin And Related     Ultram [Tramadol]     Zomig [Zolmitriptan]     Codeine Nausea And Vomiting    Depakote [Divalproex Sodium] Other (See Comments)     ' makes eyeball fall out\"    Haldol [Haloperidol Lactate]      Hand and leg tremors with palpitation    Hydrocodone Nausea And Vomiting    Ibuprofen Anxiety     Causes pt to pass out    Imitrex [Sumatriptan] Anxiety    Maxalt [Rizatriptan] Nausea And Vomiting     Makes HA worse    Neurontin [Gabapentin] Other (See Comments)     \" makes eyeballs shrink\"         Objective:   BP (!) 97/56   Pulse 77   Temp 98.6 °F (37 °C) (Oral)   Resp 18   Ht 6' 4.5\" (1.943 m)   Wt 180 lb (81.6 kg)   SpO2 94%   BMI 21.62 kg/m²     Intake/Output Summary (Last 24 hours) at 2/2/2023 0920  Last data filed at 2/1/2023 1917  Gross per 24 hour   Intake 471 ml   Output 1400 ml   Net -929 ml       TELEMETRY: Sinus     Physical Exam:  Constitutional:  Well developed, Well nourished, No acute distress, Non-toxic appearance. HENT:  Normocephalic, Atraumatic, Bilateral external ears normal, Oropharynx moist, No oral exudates, Nose normal. Neck- Normal range of motion, No tenderness, Supple, No stridor. Eyes:  PERRL, EOMI, Conjunctiva normal, No discharge. Respiratory:  Normal breath sounds, No respiratory distress, No wheezing, No chest tenderness. ,no use of accessory muscles, diaphragm movement is normal  Cardiovascular: (PMI) apex non displaced,no lifts no thrills, no s3,no s4, Normal heart rate, Normal rhythm, No murmurs, No rubs, No gallops.  Carotid arteries pulse and amplitude are normal no bruit, no abdominal bruit noted ( normal abdominal aorta ausculation), femoral arteries pulse and amplitude are normal no bruit, pedal pulses are normal  GI:  Bowel sounds normal, Soft, No tenderness, No masses, No pulsatile masses. : External genitalia appear normal, No masses or lesions. No discharge. No CVA tenderness. Musculoskeletal:  Intact distal pulses, No edema, No tenderness, No cyanosis, No clubbing. Good range of motion in all major joints. No tenderness to palpation or major deformities noted. Back- No tenderness. Integument:  Warm, Dry, No erythema, No rash. Lymphatic:  No lymphadenopathy noted. Neurologic:  Alert & oriented x 3, Normal motor function, Normal sensory function, No focal deficits noted. Psychiatric:  Affect normal, Judgment normal, Mood normal.     Medications:    sacubitril-valsartan  1 tablet Oral BID    midodrine  10 mg Oral TID WC    midodrine  5 mg Oral Once    sodium chloride flush  5-40 mL IntraVENous 2 times per day    furosemide  40 mg IntraVENous BID    amiodarone  200 mg Oral Daily    atorvastatin  80 mg Oral Daily    levothyroxine  75 mcg Oral Daily    metoprolol succinate  25 mg Oral Daily    aspirin  81 mg Oral Daily    warfarin placeholder: dosing by pharmacy   Other RX Placeholder      sodium chloride       sodium chloride flush, sodium chloride, polyethylene glycol, acetaminophen **OR** acetaminophen, potassium chloride **OR** potassium alternative oral replacement **OR** potassium chloride, magnesium sulfate, calcium carbonate    Lab Data:  CBC:   Recent Labs     02/01/23  0022 02/02/23  0503   WBC 8.8 14.6*   HGB 12.5* 10.7*   HCT 39.6* 32.3*   MCV 98.0 94.2    146     BMP:   Recent Labs     02/01/23  0022 02/01/23  0635 02/02/23  0503   * 134* 137   K 3.0* 4.3 4.3   CL 96* 99 104   CO2 20* 24 25   BUN 17 18 27*   CREATININE 1.4* 1.5* 1.3     LIVER PROFILE:   Recent Labs     02/01/23  0022   AST 21   ALT 20   BILITOT 0.6   ALKPHOS 80     PT/INR:   Recent Labs     02/01/23 0022   PROTIME 56.2*   INR 4.29     APTT: No results for input(s): APTT in the last 72 hours. BNP:  No results for input(s): BNP in the last 72 hours.   TROPONIN: @TROPONINI:3@      Assessment:  74 y. o.year old who is admitted for          Plan:  sHORETNESS OF BREATH: from CHF, start IV lasix drip, start milrinone drip increase midodrine to 10 mg 3 times dailyRecheck proBNP and chest x-ray hold off on metoprolol  PAF patient has declined A. fib ablation in the past continue Coumadin and amiodarone  Severe LV dysfunction with history of CAD and had LAD stent in 2013 LVEF is severely depressed has ICD continue metoprolol, WILL RECOMMEND TO [facility]  Critical care 45 mins  Health maintenance: exerise and diet  All labs, medications and tests reviewed, continue all other medications of all above medical condition listed as is.       Odalis Rosa MD, MD 2/2/2023 9:20 AM

## 2023-02-02 NOTE — PROGRESS NOTES
Met with patient and introduced myself as the Heart failure education R.N. Patient weak. Dozing off. Speech mumbling. Some confusion noted. Engaged in short education session. Admitting diagnosis-Heart failure  Cardiologist- ISAIAH Arteaga  Heart Failure Education Nurse consulted-yes   Ejection fraction 25% as of 2/1/23 with grade III D. D. Pro BNP- 4,675 on 2/1/23  ICD information- has ICD   Smoking Cessation information and referral-  Pneumonia vaccine- up to date   Hospital follow up: [facility] per CM note. Reviewed the Stop Light Handout with patient and instructed when to call his provider. Heart failure education added to AVS for use by LTC facility.

## 2023-02-02 NOTE — DISCHARGE INSTR - COC
Continuity of Care Form    Patient Name: Keesha Young   :  1955  MRN:  [id number]    Admit date:  2023  Discharge date:  ***    Code Status Order: Full Code   Advance Directives:     Admitting Physician:  No admitting provider for patient encounter. PCP: Tangela Nelson MD    Discharging Nurse: [facility] Unit/Room#: 1597/2895-A  Discharging Unit Phone Number: ***    Emergency Contact:   Extended Emergency Contact Information  Primary Emergency Contact: PetrHermila  Address: [place] [phone]           [address] Phone: [phone]  Relation: Legal Guardian  Secondary Emergency Contact: Noah Cruz  Address:  COMM.  SUPPORT   [address] Phone: [phone]  Relation: Other    Past Surgical History:  Past Surgical History:   Procedure Laterality Date    ANGIOPLASTY      per old chart pt had angioplasty (LAD) with cutting balloon and arthrectomy done 2010    CARDIAC CATHETERIZATION      per old chart pt had cardiac cath 2010, 2011,2012, and 2014    CARDIAC SURGERY      arteriogram; stent    CHOLECYSTECTOMY      ENDOSCOPY, COLON, DIAGNOSTIC  2015    GASTRIC FUNDOPLICATION      Robotic laparoscopic assisted nissen, cholecystectomy    HIATAL HERNIA REPAIR  10/7/15    PACEMAKER PLACEMENT      per old chart pt had biV ICD inserted 2011 and then 2014 had left ventricular lead replacement done    SINUS SURGERY      with allergy testing done in the past- Dr. Jennifer Rodriguez (([place])    TONSILLECTOMY         Immunization History:   Immunization History   Administered Date(s) Administered    COVID-19, PFIZER Bivalent BOOSTER, DO NOT Dilute, (age 12y+), IM, 30 mcg/0.3 mL 2022    COVID-19, PFIZER GRAY top, DO NOT Dilute, (age 15 y+), IM, 30 mcg/0.3 mL 2022    COVID-19, PFIZER PURPLE top, DILUTE for use, (age 15 y+), 30mcg/0.3mL 2020, 2021    Influenza 2010, 2011 Influenza Virus Vaccine 10/22/2013, 10/09/2015    Influenza, FLUARIX, FLULAVAL, FLUZONE (age 10 mo+) AND AFLURIA, (age 1 y+), PF, 0.5mL 09/19/2017    Pneumococcal Polysaccharide ([id number]) 07/31/2013    Tdap (Boostrix, Adacel) 03/12/2012       Active Problems:  Patient Active Problem List   Diagnosis Code    Arthritis M19.90    Hypothyroid E03.9    Unintentional weight loss R63.4    Anemia D64.9    CHF (NYHA class III, ACC/AHA stage C) ([facility]) I50.9    Cardiomyopathy I42.9    ASHD (arteriosclerotic heart disease) I25.10    CAD- S/P LAD stents 2013 at [facility] Z98.61    Current smoker F17.200    Bipolar affective disorder ([facility] F31.9    Prolonged QT interval R94.31    Allergic rhinitis J30.9    GERD (gastroesophageal reflux disease) K21.9    Lithium toxicity T56.891A    Acute-on-chronic renal failure (HCC) N17.9, N18.9    Hyponatremia G79.0    Metabolic acidosis, normal anion gap (NAG) E87.20    PAF (paroxysmal atrial fibrillation) ([facility]) I48.0    Lethargy R53.83    Hyperlipidemia E78.5    Biventricular ICD replaced 2019 Z95.810    Hiatal hernia K44.9    Gilmore's esophagus K22.70    CCC (chronic calculous cholecystitis) K80.10    Elevated brain natriuretic peptide (BNP) level R79.89    Acute kidney injury ([facility] N17.9    Elevated troponin level R77.8    Chest pain R07.9    Heart failure (HCC) I50.9    Moderate malnutrition ([facility]) E44.0       Isolation/Infection:   Isolation            No Isolation          Patient Infection Status       Infection Onset Added Last Indicated Last Indicated By Review Planned Expiration Resolved Resolved By    None active    Resolved    COVID-19 (Rule Out) 02/01/23 02/01/23 02/01/23 Respiratory Panel, Molecular, with COVID-19 (Restricted: peds pts or suitable admitted adults) (Ordered)   02/01/23 Rule-Out Test Resulted            Nurse Assessment:  Last Vital Signs: BP (!) 96/42   Pulse 68   Temp 99.6 °F (37.6 °C) (Oral)   Resp 24   Ht 6' 4.5\" (1.943 m)   Wt 180 lb (81.6 kg) SpO2 90%   BMI 21.62 kg/m²     Last documented pain score (0-10 scale):    Last Weight:   Wt Readings from Last 1 Encounters:   23 180 lb (81.6 kg)     Mental Status:  {IP PT MENTAL STATUS:}    IV Access:  { DARY IV ACCESS:738351348}    Nursing Mobility/ADLs:  Walking   {CHP DME FMN}  Transfer  {CHP DME KXSY:273596067}  Bathing  {CHP DME LRWS:123940116}  Dressing  {CHP DME IFYW:610049939}  Toileting  {CHP DME TACP:612678244}  Feeding  {CHP DME YSNY:851047705}  Med Admin  {P DME IGEZ:164394414}  Med Delivery   { DARY MED Delivery:541144293}    Wound Care Documentation and Therapy:  Incision 10/07/15 Abdomen (Active)   Number of days: 2675        Elimination:  Continence: Bowel: {YES / AC:07123}  Bladder: {YES / BI:06737}  Urinary Catheter: {Urinary Catheter:922864716}   Colostomy/Ileostomy/Ileal Conduit: {YES / FV:53728}       Date of Last BM: ***    Intake/Output Summary (Last 24 hours) at 2023 1550  Last data filed at 2023 1917  Gross per 24 hour   Intake 221 ml   Output 275 ml   Net -54 ml     I/O last 3 completed shifts:   In: 200 [P.O.:461; I.V.:10]  Out: 0 [Urine:1400]    Safety Concerns:     [address] Safety Concerns:563682681}    Impairments/Disabilities:      [address] Impairments/Disabilities:419497507}      Patient's personal belongings (please select all that are sent with patient):  {[facility] DME Belongings:159584793}    RN SIGNATURE:  {Esignature:560659561}    CASE MANAGEMENT/SOCIAL WORK SECTION    Inpatient Status Date: ***    Readmission Risk Assessment Score:  Readmission Risk              Risk of Unplanned Readmission:  20           Discharging to Facility/ Agency   Name: Lea Broderick  Address: [facility]  Phone: [phone]  Fax: [phone]    Dialysis Facility (if applicable)   Name:  Address:  Dialysis Schedule:  Phone:  Fax:      PHYSICIAN SECTION    Nutrition Therapy:  Current Nutrition Therapy:   [address] FO[address] Diet List:917405133}    Routes of Feeding: {P DME Other Feedings:585316500}  Liquids: {Slp liquid thickness:81467}  Daily Fluid Restriction: {CHP DME Yes amt example:368973462}  Last Modified Barium Swallow with Video (Video Swallowing Test): {Done Not Done NXGJ:249195555}    Treatments at the Time of Hospital Discharge:   Respiratory Treatments: ***  Oxygen Therapy:  {Therapy; copd oxygen:89407}  Ventilator:    {[facility] Vent JCCF:951823847}    Rehab Therapies: {THERAPEUTIC INTERVENTION:2150563642}  Weight Bearing Status/Restrictions: {[facility] Weight Bearin}  Other Medical Equipment (for information only, NOT a DME order):  {EQUIPMENT:059403996}  Other Treatments: ***    Prognosis: {Prognosis:0857983617}    Condition at Discharge: [address] [place] [address] Patient Condition:838909832}    Rehab Potential (if transferring to Rehab): {Prognosis:1877379800}    Recommended Labs or Other Treatments After Discharge: ***    Physician Certification: I certify the above information and transfer of Armani Velasquez  is necessary for the continuing treatment of the diagnosis listed and that he requires {Admit to Appropriate Level of Care:53284} for {GREATER/LESS:591223264} 30 days.      Update Admission H&P: {CHP DME Changes in WQSJ:929901697}    PHYSICIAN SIGNATURE:  {Esignature:051388004}

## 2023-02-02 NOTE — PROGRESS NOTES
PHARMACY ANTICOAGULATION MONITORING SERVICE    Shawn Chadwick is a 74 y.o. male on warfarin therapy for PAF. Pharmacy consulted by Dr. Susan Cole for monitoring and adjustment of treatment. Indication for anticoagulation: PAF  INR goal: 2 - 3  Warfarin dose prior to admission: 1.5 mg po daily    Pertinent Laboratory Values   Recent Labs     02/01/23  0022 02/02/23  0503 02/02/23  1415   INR 4.29  --  4.01   HGB 12.5* 10.7*  --    HCT 39.6* 32.3*  --     146  --          Assessment/Plan:  Drug Interactions:   Amiodarone (Home med)  Aspirin (Home med)  Levothyroxine (Home med)  Acetaminophen (PRN)  INR supratherapeutic on admission at 4.29  INR today @ 4.1, trending down  Continue to hold warfarin doses at this time; Will trend INR daily and resume therapy when INR falls into therapeutic range. Pharmacy will continue to monitor and adjust warfarin therapy as indicated    Thank you for the consult.   Alfonzo Cornelius, [address] [facility]  2/2/2023 3:02 PM

## 2023-02-02 NOTE — PROGRESS NOTES
V2.0  [facility] Hospitalist Progress Note      Name:  Jenny Alcala /Age/Sex: 1955  (74 y.o. male)   MRN & CSN:  [id number] & [id number] Encounter Date/Time: 2023 6:54 PM EST    Location:  [address] [place] [address]-A PCP: Zuleima Goetz MD       Hospital Day: 2    Assessment and Plan:   Jenny Alcala is a 74 y.o. male who presents with Heart failure ([address]      Plan:    Acute hypoxic respiratory failure  Secondary to acute on chronic systolic/diastolic heart failure  Ischemic cardiomyopathy status post AICD  Evidenced by fluid overload on clinical exam and chest x-ray, proBNP 4675  Required BiPAP due to increased work of breathing on arrival, transition to high flow nasal cannula. Echocardiogram 2021: Ejection fraction 10%, severely dilated left ventricle, grade 3 diastolic dysfunction, mild to moderate MR. Repeat TTE this visit with LVEF 25%.   -Lasix drip and dobutamine initiated (patient remained hypotensive to SBP 80s with symptom of LH when milrinone originally tried this morning)  -Monitor intake and output, Daily weights  -Strict bedrest  -Low-sodium diet  -Cardiology consulted     Hypokalemia  -Monitor closely while on diuretics    Lactic acidosis  Likely type II in the setting of hypoxia and hypotension  -Trending down, continue to monitor    CKD stage III  Baseline creatinine 1.3-1.4  Creatinine appears to be at baseline  -Monitor renal function parameters  -Avoid nephrotoxic agents     Paroxysmal atrial fibrillation  Long-term use of Coumadin  Supratherapeutic INR  INR 4.29  Continue Toprol and amiodarone  -Pharmacy to dose Coumadin    Coronary artery disease status post LAD PCI in  at [facility]  -Continue aspirin and statin     Hypothyroidism  -Continue Synthroid home regimen     Hypertension  Acceptable blood pressure trend  -Continue Toprol    Hyperlipidemia  -Continue statin    Tobacco abuse  - regarding cessation    Diet ADULT DIET; Easy to Chew; No Added Salt (3-4 gm)  ADULT ORAL NUTRITION SUPPLEMENT; Breakfast, Dinner; Standard High Calorie/High Protein Oral Supplement   DVT Prophylaxis [] Lovenox, []  Heparin, [] SCDs, [] Ambulation,  [] Eliquis, [] Xarelto  [x] Coumadin   Code Status Full Code   Disposition From: Home  Expected Disposition: TBD  Estimated Date of Discharge: 3-4 days  Patient requires continued admission due to requiring IV diuresis and IV inotropes   Surrogate Decision Maker/ POA      Subjective:     Chief Complaint: Shortness of Breath (Started 3 hrs ago)     Discussed with cardiology that patient not tolerating Lasix due to low pressures following which she was initiated on Lasix drip and milrinone along with midodrine. Patient feels better today how ever his blood pressure is lower and he was ambulated to the bathroom following which he felt lightheaded despite resting in the bed. Discussed with tech and RN that patient must remain on strict bedrest if there is concern for hypotension. Otherwise, patient remains asymptomatic. After further discussion with cardiology, did change milrinone to dobutamine and initiated hold parameters on Lasix drip. Review of Systems:    Review of Systems    Negative except as above. Objective: Intake/Output Summary (Last 24 hours) at 2/2/2023 1854  Last data filed at 2/2/2023 1315  Gross per 24 hour   Intake --   Output 575 ml   Net -575 ml        Vitals:   Vitals:    02/02/23 1830   BP: (!) 91/34   Pulse: 74   Resp:    Temp:    SpO2:        Physical Exam:     General: NAD  Eyes: EOMI  ENT: neck supple  Cardiovascular: Regular rate. Respiratory: Clear to auscultation  Gastrointestinal: Soft, non tender  Genitourinary: no suprapubic tenderness  Musculoskeletal: No edema  Skin: warm, dry  Neuro: Alert. Psych: Mood appropriate.      Medications:   Medications:    sacubitril-valsartan  1 tablet Oral BID    midodrine  10 mg Oral TID WC    sodium chloride flush  5-40 mL IntraVENous 2 times per day    amiodarone  200 mg Oral Daily    atorvastatin  80 mg Oral Daily    levothyroxine  75 mcg Oral Daily    aspirin  81 mg Oral Daily    warfarin placeholder: dosing by pharmacy   Other RX Placeholder      Infusions:    furosemide (LASIX) 1mg/mL infusion Stopped (02/02/23 1848)    DOBUTamine 10 mcg/kg/min (02/02/23 1800)    sodium chloride       PRN Meds: sodium chloride flush, 5-40 mL, PRN  sodium chloride, , PRN  polyethylene glycol, 17 g, Daily PRN  acetaminophen, 650 mg, Q6H PRN   Or  acetaminophen, 650 mg, Q6H PRN  potassium chloride, 40 mEq, PRN   Or  potassium alternative oral replacement, 40 mEq, PRN   Or  potassium chloride, 10 mEq, PRN  magnesium sulfate, 2,000 mg, PRN  calcium carbonate, 500 mg, TID PRN        Labs      Recent Results (from the past 24 hour(s))   Basic Metabolic Panel    Collection Time: 02/02/23  5:03 AM   Result Value Ref Range    Sodium 137 135 - 145 MMOL/L    Potassium 4.3 3.5 - 5.1 MMOL/L    Chloride 104 99 - 110 mMol/L    CO2 25 21 - 32 MMOL/L    Anion Gap 8 4 - 16    BUN 27 (H) 6 - 23 MG/DL    Creatinine 1.3 0.9 - 1.3 MG/DL    Est, Glom Filt Rate >60 >60 mL/min/1.73m2    Glucose 105 (H) 70 - 99 MG/DL    Calcium 8.3 8.3 - 10.6 MG/DL   Magnesium    Collection Time: 02/02/23  5:03 AM   Result Value Ref Range    Magnesium 1.8 1.8 - 2.4 mg/dl   Lipid Panel    Collection Time: 02/02/23  5:03 AM   Result Value Ref Range    Triglycerides 54 <150 MG/DL    Cholesterol 123 <200 MG/DL    HDL 42 >40 MG/DL    LDL Calculated 70 <100 MG/DL   CBC with Auto Differential    Collection Time: 02/02/23  5:03 AM   Result Value Ref Range    WBC 14.6 (H) 4.0 - 10.5 K/CU MM    RBC 3.43 (L) 4.6 - 6.2 M/CU MM    Hemoglobin 10.7 (L) 13.5 - 18.0 GM/DL    Hematocrit 32.3 (L) 42 - 52 %    MCV 94.2 78 - 100 FL    MCH 31.2 (H) 27 - 31 PG    MCHC 33.1 32.0 - 36.0 %    RDW 14.4 11.7 - 14.9 %    Platelets 217 631 - 013 K/CU MM    MPV 11.4 (H) 7.5 - 11.1 FL    Differential Type AUTOMATED DIFFERENTIAL     Segs Relative 85.7 (H) 36 - 66 % Lymphocytes % 3.8 (L) 24 - 44 %    Monocytes % 9.8 (H) 0 - 4 %    Eosinophils % 0.0 0 - 3 %    Basophils % 0.1 0 - 1 %    Segs Absolute 12.5 K/CU MM    Lymphocytes Absolute 0.6 K/CU MM    Monocytes Absolute 1.4 K/CU MM    Eosinophils Absolute 0.0 K/CU MM    Basophils Absolute 0.0 K/CU MM    Nucleated RBC % 0.0 %    Total Nucleated RBC 0.0 K/CU MM    Total Immature Neutrophil 0.09 K/CU MM    Immature Neutrophil % 0.6 (H) 0 - 0.43 %   Protime-INR    Collection Time: 02/02/23  2:15 PM   Result Value Ref Range    Protime 52.4 (H) 11.7 - 14.5 SECONDS    INR 4.01 INDEX   APTT    Collection Time: 02/02/23  2:15 PM   Result Value Ref Range    aPTT 68.5 (H) 25.1 - 37.1 SECONDS        Imaging/Diagnostics Last 24 Hours   XR CHEST (2 VW)    Result Date: 2/2/2023  EXAMINATION: TWO XRAY VIEWS OF THE CHEST 2/2/2023 10:38 am COMPARISON: 02/01/2022 HISTORY: ORDERING SYSTEM PROVIDED HISTORY: chf TECHNOLOGIST PROVIDED HISTORY: Reason for exam:->chf Reason for Exam: chf FINDINGS: There is a left-sided transvenous pacer in place and bilateral pulmonary infiltrates persist unchanged when compared to the prior study. Improved aeration in the lingula     Improved aeration in the lingula. Otherwise stable exam     XR CHEST PORTABLE    Result Date: 2/2/2023  EXAMINATION: ONE X-RAY VIEW OF THE CHEST 2/1/2023 12:29 am COMPARISON: May 21, 2022 HISTORY: ORDERING SYSTEM PROVIDED HISTORY:  Hypoxia, shortness of breath; concern for infectious process or volume overload. TECHNOLOGIST PROVIDED HISTORY: Reason for Exam:  Hypoxia, shortness of breath; concern for infectious process or volume overload. FINDINGS: Left chest wall AICD in place. Cardiomediastinal silhouette is enlarged. The lungs show diffuse ground-glass airspace opacities without significant effusions. No pneumothorax. No acute or aggressive osseous lesion. 1. Diffuse ground-glass airspace opacities concerning for infection over edema. 2. Cardiomegaly.        Electronically signed by Homar Hermosillo MD on 2/2/2023 at 6:54 PM

## 2023-02-02 NOTE — CARE COORDINATION
.Case Management Assessment  Initial Evaluation    Date/Time of Evaluation: 2/2/2023 3:38 PM  Assessment Completed by: Marcelino Oliveira RN    If patient is discharged prior to next notation, then this note serves as note for discharge by case management. Patient Name: Anuradha Oconnor                   YOB: 1955  Diagnosis: Hypokalemia [E87.6]  Heart failure ([address] [I50.9]  COPD exacerbation ([address] [J44.1]  Acute on chronic respiratory failure with hypoxia ([address] [J96.21]  Pneumonia of both lungs due to infectious organism, unspecified part of lung [J18.9]  Acute on chronic congestive heart failure, unspecified heart failure type ([address] [I50.9]                   Date / Time: 2/1/2023 12:13 AM    Patient Admission Status: Inpatient   Readmission Risk (Low < 19, Mod (19-27), High > 27): Readmission Risk Score: 15.4    Current PCP: Taya Ken MD  PCP verified by CM? Yes    Chart Reviewed: Yes      History Provided by: Patient  Patient Orientation: Alert and Oriented    Patient Cognition: Alert    Hospitalization in the last 30 days (Readmission):  No    If yes, Readmission Assessment in CM Navigator will be completed. Advance Directives:      Code Status: Full Code   Patient's Primary Decision Maker is:  (PT HAS A LEGAL GUARDIAN)      Discharge Planning:    Patient lives with:   Type of Home: Long-Term Care  Primary Care Giver:  (NURSING STAFF)  Patient Support Systems include: Other (Comment) (FROM LTC AT [facility])   Current Financial resources: Medicaid, Medicare  Current community resources: ECF/Home Care  Current services prior to admission:              Current DME:              Type of Home Care services:       ADLS  Prior functional level: Other (see comment) (LTR AT [facility])  Current functional level: Other (see comment)    PT AM-PAC:   /24  OT AM-PAC:   /24    Family can provide assistance at DC:     Would you like Case Management to discuss the discharge plan with any other family members/significant others, and if so, who? Plans to Return to Present Housing: Yes  Other Identified Issues/Barriers to RETURNING to current housing: NONE  Potential Assistance needed at discharge:              Potential DME:    Patient expects to discharge to:    Plan for transportation at discharge:      Financial    Payor: [place]iss[place] / Plan: [place] / Product Type: *No Product type* /     Does insurance require precert for SNF: No    Potential assistance Purchasing Medications:    Meds-to-Beds request: No      [facility], [address] [facility] [address] [address]  Phone: [phone] Fax: [phone] [facility], [address] [place] [address] Avenue [phone] [facility] [phone]  [address] [place] [address]  Phone: [phone] Fax: [phone]      Notes:    Factors facilitating achievement of predicted outcomes: Caregiver support, Cooperative, and Pleasant    Barriers to discharge: Medical complications    Additional Case Management Notes: PT IS A LTR AT [address] [place] [address],[address]. The Plan for Transition of Care is related to the following treatment goals of Hypokalemia [E87.6]  Heart failure (HCC) [I50.9]  COPD exacerbation ([address] [J44.1]  Acute on chronic respiratory failure with hypoxia ([address] [J96.21]  Pneumonia of both lungs due to infectious organism, unspecified part of lung [J18.9]  Acute on chronic congestive heart failure, unspecified heart failure type ([address] [P42.1]    IF APPLICABLE: The Patient and/or patient representative Naseem Diss and his family were provided with a choice of provider and agrees with the discharge plan.  Freedom of choice list with basic dialogue that supports the patient's individualized plan of care/goals and shares the quality data associated with the providers was provided to:     Patient Representative Name:       The Patient and/or Patient Representative Agree with the Discharge Plan?       Marni Lopez RN  Case Management Department

## 2023-02-03 NOTE — PROGRESS NOTES
[address]  consulted by Shaheen Nichols for monitoring and adjustment. Indication for treatment: Possible pneumonia  Goal trough: Trough Goal: 15-20 mcg/mL  AUC/RANDY: 400-600    Risk Factors for MRSA Identified:   None    Pertinent Laboratory Values:   Temp Readings from Last 3 Encounters:   02/02/23 (!) 101.2 °F (38.4 °C) (Oral)   02/14/22 98.2 °F (36.8 °C) (Oral)   11/23/21 98.9 °F (37.2 °C) (Oral)     Recent Labs     02/01/23  0022 02/02/23  0503 02/02/23  1929 02/03/23  0022   WBC 8.8 14.6*  --   --    LACTATE  --   --  1.5 1.0     Recent Labs     02/01/23  0022 02/01/23  0635 02/02/23  0503   BUN 17 18 27*   CREATININE 1.4* 1.5* 1.3     Estimated Creatinine Clearance: 65 mL/min (based on SCr of 1.3 mg/dL). Intake/Output Summary (Last 24 hours) at 2/3/2023 0217  Last data filed at 2/2/2023 1315  Gross per 24 hour   Intake --   Output 300 ml   Net -300 ml       Pertinent Cultures:   Date    Source    Results  2/01   Blood ( 1 of 4)   Staph epidermis  2/01   Resp panel PCR  Negative  2/03   MRSA Nasal   Ordered    Vancomycin level:   TROUGH:  No results for input(s): VANCOTROUGH in the last 72 hours. RANDOM:  No results for input(s): VANCORANDOM in the last 72 hours. Assessment:  HPI: 74 y.o male with pmh of atrial fibrillation, hypertension, HF and hyperlipidemia who is on admission for acute hypoxic respiratory failure secondary to acute on chronic systolic/diastolic heart failure. Patient's temperature was noted to be 101.2 and also has leukocytosis. SCr, BUN, and urine output: CKD  Day(s) of therapy: 1 of 7  Vancomycin concentration: Pending    Plan:  Vancomycin 1500 mg IVPB q24h  Pharmacy will continue to monitor patient and adjust therapy as indicated    Ophelia 3 02/04 @0200    Thank you for the consult.   [place]bania García, [address] [place] [address]  2/3/2023 2:17 AM

## 2023-02-03 NOTE — PROGRESS NOTES
PHARMACY ANTICOAGULATION MONITORING SERVICE    Joy Rodriguez is a 74 y.o. male on warfarin therapy for PAF. Pharmacy consulted by Dr. Atiya Chiang for monitoring and adjustment of treatment. Indication for anticoagulation: PAF  INR goal: 2 - 3  Warfarin dose prior to admission: 1.5 mg po daily    Pertinent Laboratory Values   Recent Labs     02/01/23  0022 02/02/23  0503 02/02/23  1415 02/03/23  0022   INR 4.29  --    < > 3.42   HGB 12.5* 10.7*  --   --    HCT 39.6* 32.3*  --   --     146  --   --     < > = values in this interval not displayed. Assessment/Plan:  Drug Interactions:   Amiodarone (Home med)  Aspirin (Home med)  Levothyroxine (Home med)  Acetaminophen (PRN)  INR supra-therapeutic on admission at 4.29  INR remains elevated but trending down, currently @ 3.42  Continue to hold warfarin doses at this time  Pharmacy will continue to trend and resume warfarin when level is back within therapeutic range (closer to 3 or below)  Additional dose adjustments to be made per INR trends, interacting medications, and other clinical factors.     Thank you for the consult,  [facility], PharmD  2/3/2023 4:20 PM

## 2023-02-03 NOTE — PROGRESS NOTES
Today's plan: Patient could not tolerate milrinone drip because of hypotension has dobutamine drip was added he has been tolerating it fine continue dobutamine drip continue Lasix drip repeat chest x-ray      Admit Date:  2/1/2023    Subjective: Some congestion in the chest      Chief complaints on admission  Chief Complaint   Patient presents with    Shortness of Breath     Started 3 hrs ago         History of present illness:Alfredito is a 74 y. o.year old who  presents with had concerns including Shortness of Breath (Started 3 hrs ago). 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 Past surgical history:   has a past surgical history that includes sinus surgery; Tonsillectomy; Cardiac surgery; Cardiac catheterization; angioplasty; pacemaker placement; Endoscopy, colon, diagnostic (9/2015); Gastric fundoplication (39/44/7914); hiatal hernia repair (10/7/15); and Cholecystectomy. Social History:   reports that he has been smoking cigarettes. He has a 23.00 pack-year smoking history. He has never used smokeless tobacco. He reports that he does not drink alcohol and does not use drugs.   Family history:  family history includes Mental Illness in his mother, sister, and sister. Allergies   Allergen Reactions    Penicillins Swelling    Azithromycin     Clozapine [Clozapine] Swelling    Haldol [Haloperidol Lactate]     Insulins      Per ecf    Niacin And Related     Ultram [Tramadol]     Zomig [Zolmitriptan]     Codeine Nausea And Vomiting    Depakote [Divalproex Sodium] Other (See Comments)     ' makes eyeball fall out\"    Haldol [Haloperidol Lactate]      Hand and leg tremors with palpitation    Hydrocodone Nausea And Vomiting    Ibuprofen Anxiety     Causes pt to pass out    Imitrex [Sumatriptan] Anxiety    Maxalt [Rizatriptan] Nausea And Vomiting     Makes HA worse    Neurontin [Gabapentin] Other (See Comments)     \" makes eyeballs shrink\"         Objective:   BP (!) 112/48   Pulse 91   Temp 98.6 °F (37 °C) (Oral)   Resp (!) 36   Ht 6' 4.5\" (1.943 m)   Wt 180 lb (81.6 kg)   SpO2 90%   BMI 21.62 kg/m²     Intake/Output Summary (Last 24 hours) at 2/3/2023 1555  Last data filed at 2/3/2023 1425  Gross per 24 hour   Intake 490 ml   Output 1475 ml   Net -985 ml         TELEMETRY: Sinus     Physical Exam:  Constitutional:  Well developed, Well nourished, No acute distress, Non-toxic appearance. HENT:  Normocephalic, Atraumatic, Bilateral external ears normal, Oropharynx moist, No oral exudates, Nose normal. Neck- Normal range of motion, No tenderness, Supple, No stridor. Eyes:  PERRL, EOMI, Conjunctiva normal, No discharge. Respiratory:  Normal breath sounds, No respiratory distress, No wheezing, No chest tenderness. ,no use of accessory muscles, diaphragm movement is normal  Cardiovascular: (PMI) apex non displaced,no lifts no thrills, no s3,no s4, Normal heart rate, Normal rhythm, No murmurs, No rubs, No gallops.  Carotid arteries pulse and amplitude are normal no bruit, no abdominal bruit noted ( normal abdominal aorta ausculation), femoral arteries pulse and amplitude are normal no bruit, pedal pulses are normal  GI:  Bowel sounds normal, Soft, No tenderness, No masses, No pulsatile masses. : External genitalia appear normal, No masses or lesions. No discharge. No CVA tenderness. Musculoskeletal:  Intact distal pulses, No edema, No tenderness, No cyanosis, No clubbing. Good range of motion in all major joints. No tenderness to palpation or major deformities noted. Back- No tenderness. Integument:  Warm, Dry, No erythema, No rash. Lymphatic:  No lymphadenopathy noted. Neurologic:  Alert & oriented x 3, Normal motor function, Normal sensory function, No focal deficits noted.    Psychiatric:  Affect normal, Judgment normal, Mood normal.     Medications:    cefepime  2,000 mg IntraVENous Q8H    vancomycin  1,500 mg IntraVENous Q24H    sacubitril-valsartan  1 tablet Oral BID    midodrine  10 mg Oral TID WC    sodium chloride flush  5-40 mL IntraVENous 2 times per day    amiodarone  200 mg Oral Daily    atorvastatin  80 mg Oral Daily    levothyroxine  75 mcg Oral Daily    aspirin  81 mg Oral Daily    warfarin placeholder: dosing by pharmacy   Other RX Placeholder      furosemide (LASIX) 1mg/mL infusion 2.5 mg/hr (02/03/23 0344)    DOBUTamine 10 mcg/kg/min (02/03/23 0432)    sodium chloride       sodium chloride flush, sodium chloride, polyethylene glycol, acetaminophen **OR** acetaminophen, potassium chloride **OR** potassium alternative oral replacement **OR** potassium chloride, magnesium sulfate, calcium carbonate    Lab Data:  CBC:   Recent Labs     02/01/23  0022 02/02/23  0503   WBC 8.8 14.6*   HGB 12.5* 10.7*   HCT 39.6* 32.3*   MCV 98.0 94.2    146       BMP:   Recent Labs     02/01/23  0635 02/02/23  0503 02/03/23  0022   * 137 135   K 4.3 4.3 3.7   CL 99 104 101   CO2 24 25 25   BUN 18 27* 29*   CREATININE 1.5* 1.3 1.4*       LIVER PROFILE:   Recent Labs     02/01/23  0022   AST 21   ALT 20   BILITOT 0.6   ALKPHOS 80       PT/INR:   Recent Labs     02/01/23 0022 02/02/23  1415 02/03/23  0022   PROTIME 56.2* 52.4* 44.7*   INR 4.29 4.01 3.42       APTT:   Recent Labs     02/02/23  1415   APTT 68.5*     BNP:  No results for input(s): BNP in the last 72 hours. TROPONIN: @TROPONINI:3@      Assessment:  74 y. o.year old who is admitted for          Plan:  Dandre Do Mikey 99: from CHF, start IV lasix drip, dobutamine drip drip recheck proBNP and chest x-ray hold off on metoprolol  PAF patient has declined A. fib ablation in the past continue Coumadin and amiodarone  Severe LV dysfunction with history of CAD and had LAD stent in 2013 LVEF is severely depressed has ICD continue metoprolol, WILL RECOMMEND TO [facility]  Critical care 45 mins  Health maintenance: exerise and diet  All labs, medications and tests reviewed, continue all other medications of all above medical condition listed as is.       Vipul Mooney MD, MD 2/3/2023 3:55 PM

## 2023-02-03 NOTE — PLAN OF CARE
Problem: Discharge Planning  Goal: Discharge to home or other facility with appropriate resources  Outcome: Progressing     Problem: Skin/Tissue Integrity  Goal: Absence of new skin breakdown  Description: 1. Monitor for areas of redness and/or skin breakdown  2. Assess vascular access sites hourly  3. Every 4-6 hours minimum:  Change oxygen saturation probe site  4. Every 4-6 hours:  If on nasal continuous positive airway pressure, respiratory therapy assess nares and determine need for appliance change or resting period.   Outcome: Progressing     Problem: Nutrition Deficit:  Goal: Optimize nutritional status  Outcome: Progressing     Problem: Chronic Conditions and Co-morbidities  Goal: Patient's chronic conditions and co-morbidity symptoms are monitored and maintained or improved  Outcome: Progressing     Problem: Safety - Adult  Goal: Free from fall injury  Outcome: Progressing

## 2023-02-03 NOTE — CARE COORDINATION
D/c plan is to return to LTC at [facility] whenever medically ready. Rapid covid needed on day of d/c. D/C INSTRUCTIONS ARE ON FRONT OF PACKET LOCATED WITH THE SOFT CHART. TE        NOTIFY Thanh 68 D/C'D.

## 2023-02-03 NOTE — DISCHARGE INSTRUCTIONS
HEART FAILURE - CONGESTIVE  HEART FAILURE  DISCHARGE INSTRUCTIONS:  GUIDELINES TO FOLLOW AT [facility]        MEDICATIONS:  Please notify the doctor if patient is not able to take their medications or if medications are being held for any reasons (such as low blood pressure ect.)  Do not give the patient ibuprofen (Advil or Motrin), naproxen (Aleve) without talking to the doctor first. This could make their heart failure worse. WEIGHT MONITORING:   Weigh patient everyday in the morning after they void (If patient is able to stand, please get a standing weight.)   Notify the doctor of a weight gain of 3 pounds or more in 1 day   OR  a total of 5 pounds or more in 1 week             DIET   Cardiac heart healthy diet- Low saturated / low trans fat, no added salt, caffeine restricted, Low sodium diet- no  more than 2,000mg (2 grams) of salt / sodium per day (which equals to a little less than  a teaspoon of salt)  The doctor may also recommend a fluid limit -  Fluid restriction- 2,000 ml (milliliters) = 64 ounces = you can have 8 glasses of fluid per day (each glass 8 ounces)    If patient is there for rehab and will be returning home in the near future; reinforce with the patient and the family to follow a low salt diet - avoid using salt at the table, avoid / limit use of canned soups, processed / packaged foods, salted snacks, olives and pickles. Do not use a salt substitute without checking with the doctor. (Mrs. Emily Martinez is safe to use).        NOTIFY THE  DOCTOR THE FIRST DAY OF ONSET OF ANY OF THESE   SYMPTOMS:   Weight gain of 3 pounds or more in 1 day         OR 5 pounds or more in one week  More shortness of breath  More swelling in stomach, legs, ankles or feet  Feeling more tired, No energy  Dry hacky cough  Dizziness  More chest pain / discomfort

## 2023-02-03 NOTE — PROGRESS NOTES
Call initiated by: Nursing staff:  Fabio Sport  Call addressed around: 2/3/2023 12:48 AM      Reason for call: Patient having fevers. Temperature of 100.4. Given Tylenol and recheck recorded 101.2. Also noted to have leukocytosis. Chest x-ray from 2/1 showed diffuse groundglass opacities favoring infectious process. Blood cultures on admission showed 1 of 4 bottles being positive for staph epididymitis. Orders placed: We will start Vanco and cefepime for possible pneumonia. Allergy to penicillins. From chart review she had received cefepime in the past.  Repeat blood cultures x2. MRSA screening. Procalcitonin.   Pharmacy to dose Vanco.    Discussed with Dr. Abdon Nagy, APRN - CNP

## 2023-02-04 PROBLEM — E87.6 HYPOKALEMIA: Status: ACTIVE | Noted: 2023-01-01

## 2023-02-04 NOTE — PROGRESS NOTES
Pt stating he needs to use bathroom- refusing bed pan. Pt is on strict bedrest due to blood pressure- messaged Dr Luis E Major about using bedside commode.  Ok to use bedside commode- pt is not to ambulate

## 2023-02-04 NOTE — PROGRESS NOTES
Cardiology Progress Note     Today's Plan: continue dobutamine/ diuresis    Admit Date:  2/1/2023    Consult reason/ Seen today for: shortness of breath    Subjective and  Overnight Events:  patient states that his breathing is bad. He does not feel like it is better than admission this morning. He can not get comfortable. Nurse and tele tech denies any significant ectopy noted on telemetry. Assessment and Plan:  Shortness of breath: Acute decompensated systolic dysfunction, ischemic CHF. NYHA class IV. Continue aggressive diuresis with dobutamine 10 mcg/kg/min and Lasix infusion. Continue Entresto 24/26 mg BID as long as SBP > 85. No BB while on dobutamine. May start SLGT2i and MRA after dobutamine off. ICD in place. PAF: in SR this morning. Continue to monitor on tele while in hospital. Recommend keeping potassium 4-4.5 and magnesium 2-2.2 in patients with atrial fibrillation. Potassium replacement ordered given aggressive diuresis for CHF. Continue coumadin per pharmacy dosing. CAD sp PCI of LAD: stable. Denies chest pain. Continue ASA and Lipitor  Severe MR: monitor fluid balance. After patient euvolemic further evaluation of VHD as an outpatient. Dyslipidemia: near goal 2/2/2023. Continue lipitor 80 mg Daily. DVT prophylaxis if not contraindicated while in the hospital.         Telemetry Reviewed:   sinus rhythm    ECHO :   Echocardiogram 2/1/2023   Summary   Left ventricular systolic function is abnormal.   Ejection fraction is visually estimated at 25%. Definity contrast used to rule out thrombus. Global hypokinesis noted. Dilated left ventricle. Severe mitral regurgitation; ERO: 0.39 cm sq. Moderate tricuspid regurgitation; RVSP: 48 mmHg. No evidence of any pericardial effusion. History of Presenting Illness:    Chief complain on admission : 74 y. o.year old who is admitted for  Chief Complaint   Patient presents with    Shortness of Breath     Started 3 hrs ago        Past medical history:    has a past medical history of A-fib ([facility] Abdominal pain, Acute on chronic renal failure ([facility] Allergic rhinitis, Bipolar affective disorder unspecified, Bursitis of left shoulder, CAD (coronary artery disease), Cardiomyopathy ([facility] CHF (NYHA class III, ACC/AHA stage C) ([facility] Cholelithiasis, Chronic kidney disease (CKD), Chronic pain, Depression, Displacement of electrode lead of cardiac pacemaker, DJD (degenerative joint disease) of cervical spine, Elevated serum creatinine, Erectile dysfunction, Fractures, GERD (gastroesophageal reflux disease), GERD (gastroesophageal reflux disease), H/O echocardiogram, Hiatal hernia, History of nuclear stress test, Hypertension, Hypothyroid, Ischemic heart disease, Lipidemia, Lithium toxicity, MI, old, Paronychia, S/P cardiac cath, Schizoaffective disorder ([facility] and Unintentional weight loss. Past surgical history:   has a past surgical history that includes sinus surgery; Tonsillectomy; Cardiac surgery; Cardiac catheterization; angioplasty; pacemaker placement; Endoscopy, colon, diagnostic (9/2015); Gastric fundoplication (94/98/9037); hiatal hernia repair (10/7/15); and Cholecystectomy. Social History:   reports that he has been smoking cigarettes. He has a 23.00 pack-year smoking history. He has never used smokeless tobacco. He reports that he does not drink alcohol and does not use drugs. Family history:  family history includes Mental Illness in his mother, sister, and sister.     Allergies   Allergen Reactions    Penicillins Swelling    Azithromycin     Clozapine [Clozapine] Swelling    Haldol [Haloperidol Lactate]     Insulins      Per ecf    Niacin And Related     Ultram [Tramadol]     Zomig [Zolmitriptan]     Codeine Nausea And Vomiting    Depakote [Divalproex Sodium] Other (See Comments)     ' makes eyeball fall out\"    Haldol [Haloperidol Lactate]      Hand and leg tremors with palpitation    Hydrocodone Nausea And Vomiting    Ibuprofen Anxiety     Causes pt to pass out    Imitrex [Sumatriptan] Anxiety    Maxalt [Rizatriptan] Nausea And Vomiting     Makes HA worse    Neurontin [Gabapentin] Other (See Comments)     \" makes eyeballs shrink\"       Review of Systems   All 14 systems were reviewed and are negative  Except for the positive findings  which as documented     BP (!) 104/50   Pulse 85   Temp 99.1 °F (37.3 °C) (Axillary)   Resp 19   Ht 6' 4.5\" (1.943 m)   Wt 180 lb (81.6 kg)   SpO2 93%   BMI 21.62 kg/m²     Intake/Output Summary (Last 24 hours) at 2/4/2023 0926  Last data filed at 2/4/2023 1688  Gross per 24 hour   Intake 490 ml   Output 2750 ml   Net -2260 ml       Physical Exam  Vitals and nursing note reviewed. Constitutional:       General: He is not in acute distress. Appearance: Normal appearance. He is ill-appearing. Interventions: Nasal cannula in place. HENT:      Head: Atraumatic. Neck:      Vascular: JVD present. No carotid bruit. Cardiovascular:      Rate and Rhythm: Normal rate and regular rhythm. Pulses: Normal pulses. Heart sounds: Murmur heard. Pulmonary:      Effort: Pulmonary effort is normal. No respiratory distress. Breath sounds: Wheezing and rales present. Musculoskeletal:      Cervical back: Neck supple. No muscular tenderness. Neurological:      Mental Status: He is alert.            Medications:    potassium bicarb-citric acid  20 mEq Oral BID    cefepime  2,000 mg IntraVENous Q8H    vancomycin  1,500 mg IntraVENous Q24H    sacubitril-valsartan  1 tablet Oral BID    midodrine  10 mg Oral TID WC    sodium chloride flush  5-40 mL IntraVENous 2 times per day    amiodarone  200 mg Oral Daily    atorvastatin  80 mg Oral Daily    levothyroxine  75 mcg Oral Daily    aspirin  81 mg Oral Daily    warfarin placeholder: dosing by pharmacy   Other RX Placeholder      DOBUTamine      furosemide (LASIX) 1mg/mL infusion Stopped (02/03/23 2041)    sodium chloride       sodium chloride flush, sodium chloride, polyethylene glycol, acetaminophen **OR** acetaminophen, potassium chloride **OR** potassium alternative oral replacement **OR** potassium chloride, magnesium sulfate, calcium carbonate    Lab Data:  CBC:   Recent Labs     02/02/23  0503 02/04/23  0026   WBC 14.6* 13.2*   HGB 10.7* 10.3*   HCT 32.3* 30.8*   MCV 94.2 93.1    178     BMP:   Recent Labs     02/02/23  0503 02/03/23  0022 02/04/23  0026    135 136   K 4.3 3.7 3.6    101 102   CO2 25 25 26   BUN 27* 29* 24*   CREATININE 1.3 1.4* 1.3     PT/INR:   Recent Labs     02/02/23  1415 02/03/23  0022 02/04/23  0026   PROTIME 52.4* 44.7* 51.8*   INR 4.01 3.42 3.96     BNP:    Recent Labs     02/04/23 0026   PROBNP 8,271*     TROPONIN: No results for input(s): TROPONINT in the last 72 hours. All labs, medications and tests reviewed by myself , continue all other medications of all above medical condition listed as is except for changes mentioned above. Thank you very much for consult , please call with questions. Electronically signed by DEE DEE Guevara CNP on 2/4/2023 at 9:26 AM    [facility]:    Severe mitral regurgitation  Heart failure with reduced ejection fraction in acute exacerbation  Acute hypoxemic respiratory failure    His chest x-ray shows severe interstitial lung disease and I doubt that this is all pulmonary edema. At present we will stop his amiodarone as it can cause lung tonsil toxicity. His CRP is quite high and I think he has underlying ARDS. He is already on antibiotics which we will continue. As for his heart failure medications are concerned we will continue with current regimen. HPI:  I have reviewed the HPI  And agree with above   Radha Baez is a 74 y. o.year old who and presents with had concerns including Shortness of Breath (Started 3 hrs ago).   Chief Complaint   Patient presents with    Shortness of Breath Started 3 hrs ago     Please review addendum/changes made to note above   Interval history: Laying in bed. He states he wants to go home. Physical Exam:  General:  Awake, alert, NAD  Head:normal  Eye:normal  Neck: Positive JVD   Chest: Decreased breath sounds bilaterally cardiovascular: Regular pulse   Abdomen:   nontender  Extremities: No edema  Pulses; palpable  Neuro: grossly normal        I agree with the plan, which was planned by myself and discussed with advanced level provider. My documented MDM is a substantive portion of the supervisory note. I have seen ,spoken to  and examined this patient personally, independently of the advanced level provider. I have spent substantiate  portion of this encounter independently myself in examining patient and developing the medical management plan . I have reviewed the hospital care given to date and reviewed all pertinent labs and imaging. The plan was developed mutually at the time of the visit with the patient,  NP /PA  and myself. I have spoken with patient, nursing staff and provided written and verbal instructions . The above note has been reviewed and I agree with the assessment, diagnosis, and treatment plan with changes made by me as follows .     Ruffin Cheadle, MD

## 2023-02-04 NOTE — PROGRESS NOTES
[address] [facility]  consulted by Constantine Bedoya for monitoring and adjustment. Indication for treatment: Possible pneumonia  Goal trough: Trough Goal: 15-20 mcg/mL  AUC/RANDY: 400-600    Risk Factors for MRSA Identified:   None    Pertinent Laboratory Values:   Temp Readings from Last 3 Encounters:   02/04/23 98.4 °F (36.9 °C) (Oral)   02/14/22 98.2 °F (36.8 °C) (Oral)   11/23/21 98.9 °F (37.2 °C) (Oral)     Recent Labs     02/02/23  0503 02/02/23  1929 02/03/23  0022 02/03/23  0815 02/03/23 2024 02/04/23  0026   WBC 14.6*  --   --   --   --  13.2*   LACTATE  --    < > 1.0 1.9 1.4  --     < > = values in this interval not displayed. Recent Labs     02/02/23  0503 02/03/23  0022 02/04/23  0026   BUN 27* 29* 24*   CREATININE 1.3 1.4* 1.3       Estimated Creatinine Clearance: 65 mL/min (based on SCr of 1.3 mg/dL). Intake/Output Summary (Last 24 hours) at 2/4/2023 0250  Last data filed at 2/3/2023 2143  Gross per 24 hour   Intake 490 ml   Output 2200 ml   Net -1710 ml         Pertinent Cultures:   Date    Source    Results  2/01   Blood ( 1 of 4)   Staph epidermis  2/01   Resp panel PCR  Negative  2/03   MRSA Nasal   Ordered    Vancomycin level:   TROUGH:    Recent Labs     02/04/23  0026   VANCOTROUGH 7.5*     RANDOM:  No results for input(s): VANCORANDOM in the last 72 hours. Assessment:  HPI: 74 y.o male with pmh of atrial fibrillation, hypertension, HF and hyperlipidemia who is on admission for acute hypoxic respiratory failure secondary to acute on chronic systolic/diastolic heart failure. Patient's temperature was noted to be 101.2 and also has leukocytosis.   SCr, BUN, and urine output: CKD III, at baseline  Day(s) of therapy: 2 of 7  Vancomycin concentration:   2/04: 7.5, ~ 21 hours post-dose on 1500 mg q24h, AUC: 517    Plan:  Continue Vancomycin 1500 mg IVPB q24h  Repeat vancomycin level in 2 days  Pharmacy will continue to monitor patient and adjust therapy as indicated    VANCOMYCIN CONCENTRATION SCHEDULED FOR 02/06 @0600    Thank you for the consult.   John Luther [facility]  2/4/2023 2:50 AM

## 2023-02-04 NOTE — SIGNIFICANT EVENT
Per report, Lasix drip held all night but uncertain of reasoning as documented vitals are higher than hold parameters. Patient with worsening imaging findings in the setting of inadequate diuresis. He is awake and alert but a very poor historian and reports that he is feeling fine and wants to go home. He has no lower extremity edema but does have positive JVD and diffuse crackles. Does have warm extremities and is not cool/clammy. Discussed with cardiology and they are concerned that patient is deteriorating rapidly and are concerned for ARDS. They request transfer to ICU. I did discuss this with the intensivist in detail who reviewed the chart and advised that there certainly is concern in the setting of decreased cardiac function however this is less likely ARDS. Did recommend repeating lactic acid level which I have ordered. Discussed all of this with patient's RN in detail including importance of Lasix and dobutamine administration with very very careful monitoring of blood pressure during this time. Did also discuss that we will be incrementally increasing the Lasix drip based on patient's BP response and requested I be kept informed hourly if not sooner with blood pressure reads and patient's condition. Increased Lasix drip from 2.5 to 5. Discussed with cardiology and they are agreeable. Discussed with ICU team that patient may require transfer to ICU for pressors if he is unable to tolerate aggressive diuresis despite dobutamine. Intensivist agreeable to accepting patient in the scenario. Discussed patient's care and severity of illness with him however he reports that he is fine and wants to go home. Did discuss the implications of this including death at which point patient stated \"I'm fine, I ain't afraid of death\" but is not able to explain the outcomes back to me. Patient does not have insight and does not understand the implications of his decisions.   Discussed this with patient's legal guardian Lana Oc) who agrees.     Discussed goals of care with patient's legal guardian and the conclusions were as follows:  -Patient to remain DNR CCA which means he should not be resuscitated in the event of a cardiac or pulmonary arrest, if the patient were to require intubation for a reversible process prior to arrest then this is acceptable and okay to intubate if the chances that he may come out of the situation are reasonable  -It is okay for patient to receive a central line for vasopressor administration  -Did discuss severity of illness and all questions answered

## 2023-02-04 NOTE — PROGRESS NOTES
V2.0  [facility] Hospitalist Progress Note      Name:  Matt Wharton /Age/Sex: 1955  (74 y.o. male)   MRN & CSN:  [id number] & [id number] Encounter Date/Time: 2/3/2023 6:54 PM EST    Location:  [address] [place] [address] PCP: Aldo Estrella MD       Hospital Day: 3    Assessment and Plan:   Matt Wharton is a 74 y.o. male who presents with Heart failure ([address]      Plan:    Acute hypoxic respiratory failure  Secondary to acute on chronic systolic/diastolic heart failure  Ischemic cardiomyopathy status post AICD  Evidenced by fluid overload on clinical exam and chest x-ray, proBNP 4675  Required BiPAP due to increased work of breathing on arrival, transition to high flow nasal cannula. Echocardiogram 2021: Ejection fraction 10%, severely dilated left ventricle, grade 3 diastolic dysfunction, mild to moderate MR. Repeat TTE this visit with LVEF 25%. -Lasix drip and dobutamine initiated (patient remained hypotensive to SBP 80s with symptom of LH when milrinone originally tried this morning)  -Urine output only documented at 750 and CXR with worsening bilateral opacities  -Monitor intake and output, Daily weights  -Strict bedrest  -Low-sodium diet  -Cardiology consulted    Severe sepsis -possibly 2/2 pneumonia  Respiratory distress on arrival.  Intermittently hypotensive, high grade fever (101.2) on 2/2 PM, leukocytosis to 14, lactate 3.4 (but down trended with diuretics). Procalcitonin up from 0.165-1.30.  UA and urine culture negative. Initial CXR with diffuse groundglass airspace opacities concerning for infection. Respiratory panel negative.   -Repeat CXR with interval progression of diffuse interstitial and alveolar opacities in bilateral lungs, likely CHF however infection difficult to rule out thus will obtain CT chest without contrast to better visualize  -Blood cultures with 1/4 bottles positive for staph epidermidis, likely contaminant; TTE negative for vegetations  -Would not administer sepsis fluids at this time as patient has very poor heart function and is in acute heart failure exacerbation at present with active need for fluid removal  -Maintain broad-spectrum antibiotics (vancomycin and cefepime)  -Follow respiratory studies  -Repeat blood cultures  -Follow inflammatory markers (procalal trending up)     Hypokalemia  -Monitor closely while on diuretics    CKD stage III  Baseline creatinine 1.3-1.4  Creatinine appears to be at baseline  -Monitor renal function parameters  -Avoid nephrotoxic agents     Paroxysmal atrial fibrillation  Long-term use of Coumadin  Supratherapeutic INR  INR 4.29  Continue Toprol and amiodarone  -Pharmacy to dose Coumadin    Coronary artery disease status post LAD PCI in [facility]  -Continue aspirin and statin     Hypothyroidism  -Continue Synthroid home regimen     Hypertension  Acceptable blood pressure trend  -Continue Toprol    Hyperlipidemia  -Continue statin    Tobacco abuse  - regarding cessation      Diet ADULT DIET; Easy to Chew; No Added Salt (3-4 gm)  ADULT ORAL NUTRITION SUPPLEMENT; Breakfast, Dinner; Standard High Calorie/High Protein Oral Supplement   DVT Prophylaxis [] Lovenox, []  Heparin, [] SCDs, [] Ambulation,  [] Eliquis, [] Xarelto  [x] Coumadin   Code Status Full Code   Disposition From: Home  Expected Disposition: TBD  Estimated Date of Discharge: 3-4 days  Patient requires continued admission due to requiring IV diuresis and IV inotropes, IV antibiotics   Surrogate Decision Maker/ POA      Subjective:     Chief Complaint: Shortness of Breath (Started 3 hrs ago)     Patient had a high-grade fever overnight following which sepsis work-up was initiated and he was started on antibiotics. His blood pressure is much improved this morning and he endorses breathing a lot better. Oxygen requirement coming down with diuresis. Review of Systems:    Review of Systems    Negative except as above. Objective:      Intake/Output Summary (Last 24 hours) at 2/3/2023 2147  Last data filed at 2/3/2023 2143  Gross per 24 hour   Intake 490 ml   Output 2200 ml   Net -1710 ml          Vitals:   Vitals:    02/03/23 2000   BP: (!) 94/48   Pulse: 83   Resp: 25   Temp:    SpO2: 92%       Physical Exam:     General: NAD  Eyes: EOMI  ENT: neck supple  Cardiovascular: Regular rate. Respiratory: Clear to auscultation  Gastrointestinal: Soft, non tender  Genitourinary: no suprapubic tenderness  Musculoskeletal: No edema  Skin: warm, dry  Neuro: Alert. Psych: Mood appropriate.      Medications:   Medications:    cefepime  2,000 mg IntraVENous Q8H    vancomycin  1,500 mg IntraVENous Q24H    sacubitril-valsartan  1 tablet Oral BID    midodrine  10 mg Oral TID WC    sodium chloride flush  5-40 mL IntraVENous 2 times per day    amiodarone  200 mg Oral Daily    atorvastatin  80 mg Oral Daily    levothyroxine  75 mcg Oral Daily    aspirin  81 mg Oral Daily    warfarin placeholder: dosing by pharmacy   Other RX Placeholder      Infusions:    furosemide (LASIX) 1mg/mL infusion Stopped (02/03/23 2041)    DOBUTamine Stopped (02/03/23 2040)    sodium chloride       PRN Meds: sodium chloride flush, 5-40 mL, PRN  sodium chloride, , PRN  polyethylene glycol, 17 g, Daily PRN  acetaminophen, 650 mg, Q6H PRN   Or  acetaminophen, 650 mg, Q6H PRN  potassium chloride, 40 mEq, PRN   Or  potassium alternative oral replacement, 40 mEq, PRN   Or  potassium chloride, 10 mEq, PRN  magnesium sulfate, 2,000 mg, PRN  calcium carbonate, 500 mg, TID PRN      Labs      Recent Results (from the past 24 hour(s))   Basic Metabolic Panel    Collection Time: 02/03/23 12:22 AM   Result Value Ref Range    Sodium 135 135 - 145 MMOL/L    Potassium 3.7 3.5 - 5.1 MMOL/L    Chloride 101 99 - 110 mMol/L    CO2 25 21 - 32 MMOL/L    Anion Gap 9 4 - 16    BUN 29 (H) 6 - 23 MG/DL    Creatinine 1.4 (H) 0.9 - 1.3 MG/DL    Est, Glom Filt Rate 56 (L) >60 mL/min/1.73m2    Glucose 106 (H) 70 - 99 MG/DL    Calcium 7.5 (L) 8.3 - 10.6 MG/DL Magnesium    Collection Time: 02/03/23 12:22 AM   Result Value Ref Range    Magnesium 1.9 1.8 - 2.4 mg/dl   Protime-INR    Collection Time: 02/03/23 12:22 AM   Result Value Ref Range    Protime 44.7 (H) 11.7 - 14.5 SECONDS    INR 3.42 INDEX   Lactic Acid    Collection Time: 02/03/23 12:22 AM   Result Value Ref Range    Lactate 1.0 0.5 - 1.9 mMOL/L   Procalcitonin    Collection Time: 02/03/23  3:00 AM   Result Value Ref Range    Procalcitonin 1.30    Lactic Acid    Collection Time: 02/03/23  8:15 AM   Result Value Ref Range    Lactate 1.9 0.5 - 1.9 mMOL/L   Lactic Acid    Collection Time: 02/03/23  8:24 PM   Result Value Ref Range    Lactate 1.4 0.5 - 1.9 mMOL/L        Imaging/Diagnostics Last 24 Hours   XR CHEST (2 VW)    Result Date: 2/2/2023  EXAMINATION: TWO XRAY VIEWS OF THE CHEST 2/2/2023 10:38 am COMPARISON: 02/01/2022 HISTORY: ORDERING SYSTEM PROVIDED HISTORY: chf TECHNOLOGIST PROVIDED HISTORY: Reason for exam:->chf Reason for Exam: chf FINDINGS: There is a left-sided transvenous pacer in place and bilateral pulmonary infiltrates persist unchanged when compared to the prior study. Improved aeration in the lingula     Improved aeration in the lingula. Otherwise stable exam     XR CHEST PORTABLE    Result Date: 2/2/2023  EXAMINATION: ONE X-RAY VIEW OF THE CHEST 2/1/2023 12:29 am COMPARISON: May 21, 2022 HISTORY: ORDERING SYSTEM PROVIDED HISTORY:  Hypoxia, shortness of breath; concern for infectious process or volume overload. TECHNOLOGIST PROVIDED HISTORY: Reason for Exam:  Hypoxia, shortness of breath; concern for infectious process or volume overload. FINDINGS: Left chest wall AICD in place. Cardiomediastinal silhouette is enlarged. The lungs show diffuse ground-glass airspace opacities without significant effusions. No pneumothorax. No acute or aggressive osseous lesion. 1. Diffuse ground-glass airspace opacities concerning for infection over edema. 2. Cardiomegaly.        Electronically signed by Tiffanie Garrido MD on 2/3/2023 at 9:47 PM

## 2023-02-04 NOTE — PROGRESS NOTES
Comprehensive Nutrition Assessment    Type and Reason for Visit:  Reassess    Nutrition Recommendations/Plan:   Continue easy to chew, no added salt diet   Will continue to offer high calorie oral nutrition supplement during stay  Encourage consistent meal intake  Will continue to follow up during stay      Malnutrition Assessment:  Malnutrition Status: Moderate malnutrition (02/01/23 9378)    Context:  Chronic Illness     Findings of the 6 clinical characteristics of malnutrition:  Energy Intake:  Mild decrease in energy intake (Comment)  Weight Loss:  No significant weight loss     Body Fat Loss:  Mild body fat loss Orbital, Triceps   Muscle Mass Loss:  Mild muscle mass loss Temples (temporalis), Clavicles (pectoralis & deltoids), Thigh (quadraceps), Calf (gastrocnemius), Hand (interosseous)  Fluid Accumulation:  Mild Extremities, Generalized   Strength:  Not Performed    Nutrition Assessment:    Remains on easy to chew, no added salt diet with standard high calorie oral nutrition supplement with meals. Limited po data during stay, some meals 0%, 50-75%. Some supplements saved in room. Current BMI low for age. Will continue to follow at high nutrition risk at this time with predicted suboptimal intake. Nutrition Related Findings:    receiving nursing care on visit, CRP elevated, sepsis from PNA,   discharge plan for LTC   last  2/1 Wound Type: None       Current Nutrition Intake & Therapies:    Average Meal Intake: 51-75%, 0%  Average Supplements Intake: Unable to assess  ADULT DIET; Easy to Chew; No Added Salt (3-4 gm)  ADULT ORAL NUTRITION SUPPLEMENT; Breakfast, Dinner; Standard High Calorie/High Protein Oral Supplement    Anthropometric Measures:  Height: 6' 4.5\" (194.3 cm)  Ideal Body Weight (IBW): 205 lbs (93 kg)    Admission Body Weight:  (stated)  Current Body Weight: 179 lb 14.3 oz (81.6 kg), 87.8 % IBW.  Weight Source: Bed Scale  Current BMI (kg/m2): 21.6  Usual Body Weight: 198 lb 9.6 oz (90.1 kg) (12/2021)  % Weight Change (Calculated): -9.4  Weight Adjustment For: No Adjustment                 BMI Categories: Underweight (BMI less than 22) age over 72    Estimated Daily Nutrient Needs:  Energy Requirements Based On: Kcal/kg  Weight Used for Energy Requirements: Current  Energy (kcal/day): 2969-6540 (30-35 kcals/kg)  Weight Used for Protein Requirements: Ideal  Protein (g/day):  (1-1.2 g/kg)  Method Used for Fluid Requirements: 1 ml/kcal  Fluid (ml/day): 1 ml/kcal or fluids per MD    Nutrition Diagnosis:   Moderate malnutrition, In context of chronic illness related to impaired respiratory function as evidenced by localized or generalized fluid accumulation, mild muscle loss, mild loss of subcutaneous fat    Nutrition Interventions:   Food and/or Nutrient Delivery: Continue Current Diet, Continue Oral Nutrition Supplement  Nutrition Education/Counseling: Survival skills/brief education completed (heart failure nutrition therapy for undernourished)  Coordination of Nutrition Care: Continue to monitor while inpatient       Goals:  Previous Goal Met: Progressing toward Goal(s)  Goals: PO intake 50% or greater       Nutrition Monitoring and Evaluation:   Behavioral-Environmental Outcomes: None Identified  Food/Nutrient Intake Outcomes: Food and Nutrient Intake, Supplement Intake  Physical Signs/Symptoms Outcomes: Biochemical Data, Meal Time Behavior, Skin, Weight, GI Status    Discharge Planning:    Continue current diet, Continue Oral Nutrition Supplement     Zaid Jasmine RD, LD  Contact: [phone]

## 2023-02-04 NOTE — PROGRESS NOTES
PHARMACY ANTICOAGULATION MONITORING SERVICE    Joy Rodriguez is a 74 y.o. male on warfarin therapy for PAF. Pharmacy consulted by Dr. Atiya Chiang for monitoring and adjustment of treatment. Indication for anticoagulation: PAF  INR goal: 2 - 3  Warfarin dose prior to admission: 1.5 mg po daily    Pertinent Laboratory Values   Recent Labs     02/02/23  0503 02/02/23  1415 02/04/23  0026   INR  --    < > 3.96   HGB 10.7*  --  10.3*   HCT 32.3*  --  30.8*     --  178    < > = values in this interval not displayed. Assessment/Plan:  Possible Drug Interactions:   Amiodarone stopped  Aspirin (Home med)  Levothyroxine (Home med)  Acetaminophen (PRN)  INR supra-therapeutic on admission at 4.29 and warfarin on hold since 2/01  INR remains elevated, initially trending down and now with increase back up to 3.96  Continue to hold warfarin doses at this time  Pharmacy will continue to trend INR and resume warfarin when level is back within therapeutic range (closer to 3 or below)  Additional dose adjustments to be made per INR trends, interacting medications, and other clinical factors.     Thank you for the consult,  Maria E Bryant [facility], PharmD  2/4/2023 2:03 PM

## 2023-02-05 NOTE — PROCEDURES
[facility] - General Surgery    PATIENT: Nikki Quintero, 1955, 74 y.o., male    MRN: [id number]    DATE: February 5, 2023    PRE-OP DIAGNOSIS: hemodynamic instability       POST-OP DIAGNOSIS: Same    PROCEDURE(S): Placement of right subclavian vein central venous catheter     SURGEON(S): DEE DEE Page CNP    ASSISTANT(S):  none    ANESTHESIA: Local 1% Xylocaine, ~5mL in total    ESTIMATED BLOOD LOSS:  Less then 5 ml           COMPLICATIONS: none           CONDITION / DISPOSITION:  Stable    INDICATIONS: Caroline Pereyra is a 74 y.o. male presenting with hemodynamic instability and need for central venous access. The risks, benefits, potential complications and possible alternatives of the procedure were discussed in detail, including complications of but not limited to infection, bleeding, pneumothorax, need for chest tube placement, or need for different venous access. All questions were answered. The patient representative wished to proceed and consent was documented in the medical record. PROCEDURE IN DETAIL:   After informed consent was obtained, a pre-procedural time-out was conducted, and the patient was placed in Trendelenburg position. The right neck was prepped and draped in the usual sterile fashion with chlorhexidine. The right subclavian vein was identified. A small amount of 1% lidocaine without epinephrine was infiltrated into the subcutaneous tissues overlying the vein. A finder needle was then used to access the vein. Dark, venous, non pulsatile blood flow was observed. A guide wire was passed without difficulty. A skin nick was made. The finder needle was removed over the guidewire and a dilator was passed without difficulty. A 7F triple lumen central venous catheter was passed over the wire using Seldinger techniquE. The guidewire was removed. Each of the catheter lumens were checked and found to have good flush and flow.   Care was taken not to introduce any air into the venous system. Sterile caps and a biopatch were placed. A sterile dressing was applied. The patient tolerated the procedure well and all sharps were disposed of appropriately. The procedure was concluded. I was present and primarily performed all critical portions of the procedure.       Post-procedure chest x-ray: Yes      Electronically signed: DEE DEE Mathis CNP 2/5/2023 12:08 PM

## 2023-02-05 NOTE — PROGRESS NOTES
V2.0  [facility] Hospitalist Progress Note      Name:  Enzo Chino /Age/Sex: 1955  (74 y.o. male)   MRN & CSN:  [id number] & [id number] Encounter Date/Time: 2023 6:54 PM EST    Location:  [address] [place] [address] PCP: Janina Sung MD       Hospital Day: 5    Assessment and Plan:   Enzo Chino is a 74 y.o. male who presents with Heart failure ([address]    Patient does not have capacity to make his own decisions as he does not have insight or understand the implications of his decisions. He has a legal guardian whose information is in the chart. Please reach out to the legal guardian with any concerning matters or decisions. Plan:    Acute hypoxic respiratory failure  Acute on chronic systolic/diastolic heart failure  Hx of ischemic cardiomyopathy status post AICD  Suspect pneumonia  Evidenced by fluid overload on clinical exam and chest x-ray, proBNP 4675. Required BiPAP due to increased work of breathing on arrival, transitioned to HFNC.   -Echocardiogram 2021: Ejection fraction 10%, severely dilated left ventricle, grade 3 diastolic dysfunction, mild to moderate MR. Repeat TTE this visit with LVEF 25%.   -Lasix drip and dobutamine initiated (patient remained hypotensive to SBP 80s with symptom of LH when milrinone was attempted), hold BB  -Uptitrate lasix gtt as able, raised from 2.5 to 5 yesterday  -Urine output 1225 in past 24 hrs???  -CT with diffuse groundglass and interstitial opacities, moderate bilateral pleural effusions  -Serial CXR with some improvement in b/l airspace opacities  -Repeat ABG today, CXR tomorrow AM  -Monitor intake and output, Daily weights  -Strict bedrest  -Low-sodium diet  -Cardiology following: Recommend transfer to ICU  -Discussed with intensivist: No indication to transfer patient to ICU at this time however if patient is unable to tolerate aggressive diuresis and requires pressor support for improvement then he may be transferred to ICU, discussed with patient's legal guardian who is agreeable to proceeding with central line placement and pressor administration if determined to be beneficial, she also clarified CODE STATUS to be DNR CCA    Severe sepsis -possibly 2/2 pneumonia  Respiratory distress on arrival.  Intermittently hypotensive, high grade fever (101.2) on 2/2 PM, leukocytosis to 14, lactate 3.4 (but down trended with diuretics). Procalcitonin up from 0.165-1.30.  UA and urine culture negative. Initial CXR with diffuse groundglass airspace opacities concerning for infection. Respiratory panel negative.   -Repeat CXR with interval progression of diffuse interstitial and alveolar opacities in bilateral lungs, likely CHF however infection difficult to rule out thus will obtain CT chest without contrast to better visualize  -Blood cultures with 1/4 bottles positive for staph epidermidis, likely contaminant; repeat blood cultures negative; TTE negative for vegetations  -Would not administer sepsis fluids at this time as patient has very poor heart function and is in acute heart failure exacerbation at present with active need for fluid removal  -Maintain broad-spectrum antibiotics [vancomycin and cefepime, will add doxycycline for atypical coverage (cannot do azithromycin or levaquin as qtc prolonged)]  -Follow respiratory studies  -Follow inflammatory markers (procalal trending up)  -Steroids, duonebs, morphine     Hypokalemia  -Monitor closely while on diuretics    CKD stage III  Baseline creatinine 1.3-1.4  Creatinine appears to be at baseline  -Monitor renal function parameters  -Avoid nephrotoxic agents     Paroxysmal atrial fibrillation  Long-term use of Coumadin  Supratherapeutic INR  INR 4.29  -Cardiology stopped amiodarone as concern for lung toxicity   -Hold Toprol as patient requiring dobutamine  -Pharmacy to dose Coumadin    Coronary artery disease status post LAD PCI in [facility] at [facility]  -Continue aspirin and statin     Hypothyroidism  -Continue Synthroid home regimen     Hypertension -patient hypotensive  -Hold BB as patient on dobutamine    Hyperlipidemia  -Continue statin    Tobacco abuse  - regarding cessation      Diet ADULT ORAL NUTRITION SUPPLEMENT; Breakfast, Dinner; Standard High Calorie/High Protein Oral Supplement  ADULT DIET; Easy to Chew; Low Fat/Low Chol/High Fiber/2 gm Na; Low Sodium (2 gm); 1500 ml   DVT Prophylaxis [] Lovenox, []  Heparin, [] SCDs, [] Ambulation,  [] Eliquis, [] Xarelto  [x] Coumadin   Code Status DNR-CCA   Disposition From: Home  Expected Disposition: [facility]  Estimated Date of Discharge: 3-4 days  Patient requires continued admission due to requiring IV diuresis and IV inotropes, IV antibiotics   Surrogate Decision Maker/ POA      Subjective:     Chief Complaint: Shortness of Breath (Started 3 hrs ago)     No more fevers and patient states she feels well however oxygen requirement up to 11 in the room and SpO2 88%. Also reports sore throat, doesn't want to eat breakfast. BP 76/44 and repeat 84/38 this morning. Will hold lasix drip and administer midodrine and recheck BP. Unable to titrate gtt up at present. Review of Systems:    Review of Systems    Negative except as above. Objective: Intake/Output Summary (Last 24 hours) at 2/5/2023 0740  Last data filed at 2/5/2023 0509  Gross per 24 hour   Intake --   Output 925 ml   Net -925 ml          Vitals:   Vitals:    02/05/23 0509   BP:    Pulse: 94   Resp: 26   Temp:    SpO2:        Physical Exam:     General: NAD  Eyes: EOMI  ENT: neck supple  Cardiovascular: Regular rate. Respiratory: Clear to auscultation  Gastrointestinal: Soft, non tender  Genitourinary: no suprapubic tenderness  Musculoskeletal: No edema  Skin: Extremities are warm, dry  Neuro: Alert. Psych: Mood appropriate.      Medications:   Medications:    ipratropium-albuterol  1 ampule Inhalation 4x daily    potassium bicarb-citric acid  20 mEq Oral BID    doxycycline (VIBRAMYCIN) IV  100 mg IntraVENous Q12H    cefepime  2,000 mg IntraVENous Q8H    vancomycin  1,500 mg IntraVENous Q24H    sacubitril-valsartan  1 tablet Oral BID    midodrine  10 mg Oral TID WC    sodium chloride flush  5-40 mL IntraVENous 2 times per day    atorvastatin  80 mg Oral Daily    levothyroxine  75 mcg Oral Daily    aspirin  81 mg Oral Daily    warfarin placeholder: dosing by pharmacy   Other RX Placeholder      Infusions:    DOBUTamine 10 mcg/kg/min (02/05/23 0604)    furosemide (LASIX) 1mg/mL infusion 5 mg/hr (02/05/23 0607)    sodium chloride       PRN Meds: morphine, 1 mg, Q4H PRN  Benzocaine-Menthol, 1 lozenge, Q2H PRN  sodium chloride flush, 5-40 mL, PRN  sodium chloride, , PRN  polyethylene glycol, 17 g, Daily PRN  acetaminophen, 650 mg, Q6H PRN   Or  acetaminophen, 650 mg, Q6H PRN  potassium chloride, 40 mEq, PRN   Or  potassium alternative oral replacement, 40 mEq, PRN   Or  potassium chloride, 10 mEq, PRN  magnesium sulfate, 2,000 mg, PRN  calcium carbonate, 500 mg, TID PRN      Labs      Recent Results (from the past 24 hour(s))   Blood gas, arterial    Collection Time: 02/04/23 10:30 AM   Result Value Ref Range    pH, Bld 7.51 (H) 7.34 - 7.45    pCO2, Arterial 31.0 (L) 32 - 45 MMHG    pO2, Arterial 90 75 - 100 MMHG    Base Exc, Mixed 2.3 (H) 0 - 1.2    HCO3, Arterial 24.7 (H) 18 - 23 MMOL/L    CO2 Content 25.7 (H) 19 - 24 MMOL/L    O2 Sat 95.6 (L) 96 - 97 %    Carbon Monoxide, Blood 1.6 0 - 5 %    Methemoglobin, Arterial 1.2 <1.5 %    Comment 6LPM    Troponin    Collection Time: 02/04/23  5:04 PM   Result Value Ref Range    Troponin T 0.055 (H) <0.01 NG/ML   Lactic Acid    Collection Time: 02/04/23  5:04 PM   Result Value Ref Range    Lactate 2.0 (HH) 0.5 - 1.9 mMOL/L   Basic Metabolic Panel    Collection Time: 02/04/23  8:56 PM   Result Value Ref Range    Sodium 135 135 - 145 MMOL/L    Potassium 3.7 3.5 - 5.1 MMOL/L    Chloride 101 99 - 110 mMol/L    CO2 26 21 - 32 MMOL/L    Anion Gap 8 4 - 16    BUN 22 6 - 23 MG/DL    Creatinine 1.2 0.9 - 1.3 MG/DL    Est, Glom Filt Rate >60 >60 mL/min/1.73m2    Glucose 125 (H) 70 - 99 MG/DL    Calcium 7.5 (L) 8.3 - 10.6 MG/DL   Basic Metabolic Panel    Collection Time: 02/05/23  1:08 AM   Result Value Ref Range    Sodium 137 135 - 145 MMOL/L    Potassium 3.6 3.5 - 5.1 MMOL/L    Chloride 101 99 - 110 mMol/L    CO2 24 21 - 32 MMOL/L    Anion Gap 12 4 - 16    BUN 23 6 - 23 MG/DL    Creatinine 1.4 (H) 0.9 - 1.3 MG/DL    Est, Glom Filt Rate 56 (L) >60 mL/min/1.73m2    Glucose 110 (H) 70 - 99 MG/DL    Calcium 7.6 (L) 8.3 - 10.6 MG/DL   Magnesium    Collection Time: 02/05/23  1:08 AM   Result Value Ref Range    Magnesium 1.8 1.8 - 2.4 mg/dl   Protime-INR    Collection Time: 02/05/23  1:08 AM   Result Value Ref Range    Protime 53.1 (H) 11.7 - 14.5 SECONDS    INR 4.06 INDEX   C-Reactive Protein    Collection Time: 02/05/23  1:08 AM   Result Value Ref Range    CRP High Sensitivity 330.0 (H) <5.0 mg/L   Procalcitonin    Collection Time: 02/05/23  1:08 AM   Result Value Ref Range    Procalcitonin 1.31    Brain Natriuretic Peptide    Collection Time: 02/05/23  1:08 AM   Result Value Ref Range    Pro-BNP 8,993 (H) <300 PG/ML        Imaging/Diagnostics Last 24 Hours   XR CHEST (2 VW)    Result Date: 2/2/2023  EXAMINATION: TWO XRAY VIEWS OF THE CHEST 2/2/2023 10:38 am COMPARISON: 02/01/2022 HISTORY: ORDERING SYSTEM PROVIDED HISTORY: chf TECHNOLOGIST PROVIDED HISTORY: Reason for exam:->chf Reason for Exam: chf FINDINGS: There is a left-sided transvenous pacer in place and bilateral pulmonary infiltrates persist unchanged when compared to the prior study. Improved aeration in the lingula     Improved aeration in the lingula. Otherwise stable exam     XR CHEST PORTABLE    Result Date: 2/2/2023  EXAMINATION: ONE X-RAY VIEW OF THE CHEST 2/1/2023 12:29 am COMPARISON: May 21, 2022 HISTORY: ORDERING SYSTEM PROVIDED HISTORY:  Hypoxia, shortness of breath; concern for infectious process or volume overload. TECHNOLOGIST PROVIDED HISTORY: Reason for Exam:  Hypoxia, shortness of breath; concern for infectious process or volume overload. FINDINGS: Left chest wall AICD in place. Cardiomediastinal silhouette is enlarged. The lungs show diffuse ground-glass airspace opacities without significant effusions. No pneumothorax. No acute or aggressive osseous lesion. 1. Diffuse ground-glass airspace opacities concerning for infection over edema. 2. Cardiomegaly.        Electronically signed by Krystina Renae MD on 2/5/2023 at 7:40 AM

## 2023-02-05 NOTE — PROGRESS NOTES
PHARMACY ANTICOAGULATION MONITORING SERVICE    Deanne Armas is a 74 y.o. male on warfarin therapy for PAF. Pharmacy consulted by Dr. Rajesh Koehler for monitoring and adjustment of treatment. Indication for anticoagulation: PAF  INR goal: 2 - 3  Warfarin dose prior to admission: 1.5 mg po daily    Pertinent Laboratory Values   Recent Labs     02/04/23  0026 02/05/23  0108   INR 3.96 4.06   HGB 10.3*  --    HCT 30.8*  --      --          Assessment/Plan:  Possible Drug Interactions:   Amiodarone stopped  Aspirin (Home med)  Levothyroxine (Home med)  Acetaminophen (PRN)  Doxycycline and cefepime may increase effectiveness of warfarin   Methylprednisolone may increase effectiveness of warfarin   INR supra-therapeutic on admission at 4.29  Warfarin placed on hold since 2/01  INR remains elevated, initially trending down and now with increase back up to 4.06  Continue to hold warfarin doses at this time  Pharmacy will continue to trend INR and resume warfarin when level is back within therapeutic range (closer to 3 or below)  Additional dose adjustments to be made per INR trends, interacting medications, and other clinical factors.     Thank you for the consult,  Latosha Salgado [facility], PharmD  2/5/2023 4:25 PM

## 2023-02-05 NOTE — PROGRESS NOTES
1440: Pt lasix drip increased to 5mg. Dr wants bp checked q 5 minutes and monitored due to bp being on softer side- pt is on dobutamine drip as well.       1728: bp trend sent to Dr. Tammy Jackson- orders to continue to monitor q 5 min bp.     4987: dr notified of pt current trend of bp- continue to monitor every 15 minutes.  Continue current orders for lasix

## 2023-02-05 NOTE — PROGRESS NOTES
Cardiology Progress Note     Today's Plan: continue dobutamine/ diuresis    Admit Date:  2/1/2023    Consult reason/ Seen today for: shortness of breath    Subjective and  Overnight Events:  he is not as responsive today as he had been yesterday. He bats at my stethoscope and does not want me to remove his covers for examination telling me he does not want stuck again. Even with explanation that I am listening with my stethoscope and have no needles he continues to say do not stick me. He is not oriented to sequence of events. He does not recognize me from yesterday. He is now requiring high flow NC 13 LPM. H      Assessment and Plan:  Shortness of breath: Acute decompensated systolic dysfunction, ischemic CHF. NYHA class IV. Continue aggressive diuresis with dobutamine 10 mcg/kg/min and Lasix infusion. Continue Entresto 24/26 mg BID as long as SBP > 85. No BB while on dobutamine. May start SLGT2i and MRA after dobutamine off. ICD in place. ARDS: PF ratio 205 from ABG yesterday while on 6 LPM per NC. I am concerned his lung condition is deteriorating. Hospitalist spoke to family and patient is a [facility] with intubation line placement and presser support if needed. Hypotension: MAP in the 40-50's. Recommend CVC placement for CVP monitoring. He may be Dry. May need levophed to allow more diuresis if CVP elevated. PAF: in SR this morning. Continue to monitor on tele while in hospital. Recommend keeping potassium 4-4.5 and magnesium 2-2.2 in patients with atrial fibrillation. Potassium replacement ordered given aggressive diuresis for CHF. Continue coumadin per pharmacy dosing. CAD sp PCI of LAD: stable. Denies chest pain. Continue ASA and Lipitor  Severe MR: monitor fluid balance. After patient euvolemic further evaluation of VHD as an outpatient. Dyslipidemia: near goal 2/2/2023. Continue lipitor 80 mg Daily.    DVT prophylaxis if not contraindicated while in the hospital.         Telemetry Reviewed:   sinus rhythm    ECHO :   Echocardiogram 2/1/2023   Summary   Left ventricular systolic function is abnormal.   Ejection fraction is visually estimated at 25%. Definity contrast used to rule out thrombus. Global hypokinesis noted. Dilated left ventricle. Severe mitral regurgitation; ERO: 0.39 cm sq. Moderate tricuspid regurgitation; RVSP: 48 mmHg. No evidence of any pericardial effusion. History of Presenting Illness:    Chief complain on admission : 74 y. o.year old who is admitted for  Chief Complaint   Patient presents with    Shortness of Breath     Started 3 hrs ago          Past medical history:    has a past medical history of A-fib ([facility] [address] Abdominal pain, Acute on chronic renal failure ([facility] [address] Allergic rhinitis, Bipolar affective disorder unspecified, Bursitis of left shoulder, CAD (coronary artery disease), Cardiomyopathy ([address] CHF (NYHA class III, ACC/AHA stage C) ([facility] [address] Cholelithiasis, Chronic kidney disease (CKD), Chronic pain, Depression, Displacement of electrode lead of cardiac pacemaker, DJD (degenerative joint disease) of cervical spine, Elevated serum creatinine, Erectile dysfunction, Fractures, GERD (gastroesophageal reflux disease), GERD (gastroesophageal reflux disease), H/O echocardiogram, Hiatal hernia, History of nuclear stress test, Hypertension, Hypothyroid, Ischemic heart disease, Lipidemia, Lithium toxicity, MI, old, Paronychia, S/P cardiac cath, Schizoaffective disorder ([facility] [address] and Unintentional weight loss. Past surgical history:   has a past surgical history that includes sinus surgery; Tonsillectomy; Cardiac surgery; Cardiac catheterization; angioplasty; pacemaker placement; Endoscopy, colon, diagnostic (9/2015); Gastric fundoplication (23/18/8098); hiatal hernia repair (10/7/15); and Cholecystectomy. Social History:   reports that he has been smoking cigarettes. He has a 23.00 pack-year smoking history.  He has never used smokeless tobacco. He reports that he does not drink alcohol and does not use drugs. Family history:  family history includes Mental Illness in his mother, sister, and sister. Allergies   Allergen Reactions    Penicillins Swelling    Azithromycin     Clozapine [Clozapine] Swelling    Haldol [Haloperidol Lactate]     Insulins      Per ecf    Niacin And Related     Ultram [Tramadol]     Zomig [Zolmitriptan]     Codeine Nausea And Vomiting    Depakote [Divalproex Sodium] Other (See Comments)     ' makes eyeball fall out\"    Haldol [Haloperidol Lactate]      Hand and leg tremors with palpitation    Hydrocodone Nausea And Vomiting    Ibuprofen Anxiety     Causes pt to pass out    Imitrex [Sumatriptan] Anxiety    Maxalt [Rizatriptan] Nausea And Vomiting     Makes HA worse    Neurontin [Gabapentin] Other (See Comments)     \" makes eyeballs shrink\"       Review of Systems   All 14 systems were reviewed and are negative  Except for the positive findings  which as documented     BP (!) 97/39   Pulse 94   Temp 99 °F (37.2 °C) (Oral)   Resp 27   Ht 6' 4.5\" (1.943 m)   Wt 172 lb 13.5 oz (78.4 kg)   SpO2 93%   BMI 20.76 kg/m²     Intake/Output Summary (Last 24 hours) at 2/5/2023 1248  Last data filed at 2/5/2023 1208  Gross per 24 hour   Intake --   Output 1400 ml   Net -1400 ml         Physical Exam  Vitals and nursing note reviewed. Constitutional:       General: He is not in acute distress. Appearance: Normal appearance. He is ill-appearing. Interventions: Nasal cannula in place. HENT:      Head: Atraumatic. Neck:      Vascular: No carotid bruit or JVD. Cardiovascular:      Rate and Rhythm: Normal rate and regular rhythm. Pulses: Normal pulses. Heart sounds: Murmur heard. Pulmonary:      Effort: Pulmonary effort is normal. No respiratory distress. Breath sounds: Wheezing and rales present. Musculoskeletal:      Cervical back: Neck supple. No muscular tenderness. Neurological:      Mental Status: He is alert. Medications:    ipratropium-albuterol  1 ampule Inhalation 4x daily    methylPREDNISolone  40 mg IntraVENous Q12H    methylPREDNISolone  125 mg IntraVENous Once    potassium bicarb-citric acid  20 mEq Oral BID    doxycycline (VIBRAMYCIN) IV  100 mg IntraVENous Q12H    cefepime  2,000 mg IntraVENous Q8H    vancomycin  1,500 mg IntraVENous Q24H    sacubitril-valsartan  1 tablet Oral BID    midodrine  10 mg Oral TID WC    sodium chloride flush  5-40 mL IntraVENous 2 times per day    atorvastatin  80 mg Oral Daily    levothyroxine  75 mcg Oral Daily    aspirin  81 mg Oral Daily    warfarin placeholder: dosing by pharmacy   Other RX Placeholder      DOBUTamine 10 mcg/kg/min (02/05/23 0604)    furosemide (LASIX) 1mg/mL infusion 5 mg/hr (02/05/23 0607)    sodium chloride       morphine, Benzocaine-Menthol, sodium chloride flush, sodium chloride, polyethylene glycol, acetaminophen **OR** acetaminophen, potassium chloride **OR** potassium alternative oral replacement **OR** potassium chloride, magnesium sulfate, calcium carbonate    Lab Data:  CBC:   Recent Labs     02/04/23 0026   WBC 13.2*   HGB 10.3*   HCT 30.8*   MCV 93.1          BMP:   Recent Labs     02/04/23  0026 02/04/23  2056 02/05/23  0108    135 137   K 3.6 3.7 3.6    101 101   CO2 26 26 24   BUN 24* 22 23   CREATININE 1.3 1.2 1.4*       PT/INR:   Recent Labs     02/03/23  0022 02/04/23  0026 02/05/23  0108   PROTIME 44.7* 51.8* 53.1*   INR 3.42 3.96 4.06       BNP:    Recent Labs     02/04/23  0026 02/05/23  0108   PROBNP 8,271* 8,993*       TROPONIN:   Recent Labs     02/04/23  1704   TROPONINT 0.055*                 All labs, medications and tests reviewed by myself , continue all other medications of all above medical condition listed as is except for changes mentioned above. Thank you very much for consult , please call with questions.     Electronically signed by DEE DEE Dale CNP on 2/5/2023 at 12:48 PM    CARDIOLOGY ATTENDING ADDENDUM    MEDICAL DECISION MAKING:    Acute decompensated heart failure with reduced ejection fraction: As above continue with Lasix drip for now. We will switch dobutamine to norepinephrine given significant hypotension. ARDS with possible sepsis: Patient has very high CRP as well as high white count and I do not think heart failure alone can explain that. Treatment per primary team.  I would recommend higher level of care given his continued respiratory and hemodynamic instability. CAD: Continue cardiac regimen as above. HPI:  I have reviewed the HPI  And agree with above   Joe Peoples is a 74 y. o.year old who and presents with had concerns including Shortness of Breath (Started 3 hrs ago). Chief Complaint   Patient presents with    Shortness of Breath     Started 3 hrs ago     Please review addendum/changes made to note above   Interval history: Remains hypotensive. He also has pneumothorax after central line placement. Physical Exam:  General: In mild respiratory distress  Head:normal  Eye:normal  Neck:  No JVD   Chest: Decreased breath sounds bilaterally  Cardiovascular: Regular pulse   Abdomen:   nontender  Extremities: No edema  Pulses; palpable  Neuro: grossly normal        I agree with the plan, which was planned by myself and discussed with advanced level provider. My documented MDM is a substantive portion of the supervisory note. I have seen ,spoken to  and examined this patient personally, independently of the advanced level provider. I have spent substantiate  portion of this encounter independently myself in examining patient and developing the medical management plan . I have reviewed the hospital care given to date and reviewed all pertinent labs and imaging. The plan was developed mutually at the time of the visit with the patient,  NP /PA  and myself. I have spoken with patient, nursing staff and provided written and verbal instructions . The above note has been reviewed and I agree with the assessment, diagnosis, and treatment plan with changes made by me as follows .     Chelo Damico MD

## 2023-02-05 NOTE — PROGRESS NOTES
Spoke with legal guardian, Mickie Avril, regarding central line placement. Received verbal consent. Attempted to talk to the patient regarding the procedure and he adamantly refused; stating no one is going to \"stick\" him. Explained that doing one more stick would help him not have to get stuck multiple times. He still refused. Spoke with guardian again who gave consent to restrain if needed.     Nicci Doyle, APRN-CNP

## 2023-02-05 NOTE — PROGRESS NOTES
During bedside report pt c/o sore throat. Oxygen noted to go from 93% down to 86-89% on 10L high flow- Dr Nicole Shaw notified- stated will be at bedside shortly.

## 2023-02-05 NOTE — PROGRESS NOTES
Pt ordered to get cvc placed- gen surg at bedside to place, pt tolerated procedure well then started c/o sob- became tachypnic and o2 sats dropped. Pt placed on 15L high flow, resp called and vapo therm was placed. Stat chest xray showed pneumo- both Dr. Tim Moreira and Nurse practitioner Kem Willis at bedside along with respiratory. Pt was transferred to ICU 2116- gave charge nurse report on phone then bedside report given to nurse taking over care.

## 2023-02-05 NOTE — FLOWSHEET NOTE
Patient to ICU room 2116 from 3N. Chest tube in place and draining. No open areas noted on skin from nursing assessment by Remi Arnold and Seymour Vaughan RN.

## 2023-02-05 NOTE — SIGNIFICANT EVENT
Pt's oxygen requirement up to 13L this morning with SpO2 88%, SBP in 90s on dobutamine 10 and lasix 5. Discussed with cardiology who advised CVC placement for CVP monitoring. Discussed with legal guardian who consented to proceed. Consulted general surgery who placed CVC which was complicated by pneumothorax (pt with respiratory distress, SpO2 60s on 15L NC, SBP 90s and borderline tachycardia). General surgery placed chest tube with stabilization of SpO2 on vapotherm, pt no longer in respiratory distress. Discussed with intensivist who assessed pt and accepts pt to ICU at this time. Attempted to contact legal guardian with update however unable to reach.

## 2023-02-05 NOTE — PROGRESS NOTES
Respirations continue to be in mid 29's Yady Diego notified and CXR ordered,     Metsa 36- Pt c/o of sharp Rt. Sided rib cage pain respirations continue in the mid 30's. Rachel ANDERSEN notified and order noted for pain management.

## 2023-02-05 NOTE — PROGRESS NOTES
Large amount of drainage from around chest tube, dressings changed/reinforced, linens changed, pt in comfortable position, call light in reach.

## 2023-02-05 NOTE — PROGRESS NOTES
[id number]  consulted by Sujatha Ryder for monitoring and adjustment. Indication for treatment: Possible pneumonia  Goal trough: Trough Goal: 15-20 mcg/mL  AUC/RANDY: 400-600    Risk Factors for MRSA Identified:   None    Pertinent Laboratory Values:   Temp Readings from Last 3 Encounters:   02/05/23 98.9 °F (37.2 °C) (Oral)   02/14/22 98.2 °F (36.8 °C) (Oral)   11/23/21 98.9 °F (37.2 °C) (Oral)     Recent Labs     02/03/23 2024 02/04/23  0026 02/04/23  1704 02/05/23  1430   WBC  --  13.2*  --   --    LACTATE 1.4  --  2.0* 1.8       Recent Labs     02/04/23  0026 02/04/23 2056 02/05/23  0108   BUN 24* 22 23   CREATININE 1.3 1.2 1.4*       Estimated Creatinine Clearance: 58 mL/min (A) (based on SCr of 1.4 mg/dL (H)). Intake/Output Summary (Last 24 hours) at 2/5/2023 1622  Last data filed at 2/5/2023 1208  Gross per 24 hour   Intake --   Output 1400 ml   Net -1400 ml         Pertinent Cultures:   Date    Source    Results  2/01   Blood ( 1 of 4)   Staph epidermis  2/01   Resp panel PCR  Negative  2/03   MRSA Nasal   Pending  2/03   Blood    NGTD    Vancomycin level:   TROUGH:    Recent Labs     02/04/23  0026   VANCOTROUGH 7.5*       RANDOM:  No results for input(s): VANCORANDOM in the last 72 hours. Assessment:  HPI: 74 y.o male with pmh of atrial fibrillation, hypertension, HF and hyperlipidemia who is on admission for acute hypoxic respiratory failure secondary to acute on chronic systolic/diastolic heart failure. Concern for pneumonia with fever and elevated WBC.   SCr, BUN, and urine output:   CKD III, at baseline  Day(s) of therapy: 3 of 7  Vancomycin concentration:   2/04: 7.5, ~ 21 hours post-dose on 1500 mg q24h, AUC: 517 (therapeutic) (1250 q24h)    Plan:  Continue Vancomycin 1500 mg IVPB q24h  Repeat vancomycin level tomorrow AM  MRSA nares is pending  Pharmacy will continue to monitor patient and adjust therapy as indicated    VANCOMYCIN CONCENTRATION SCHEDULED FOR 02/06 @0600    Thank you for the consult,  Lizzeth KONG [facility] [place], PharmD  2/5/2023 4:22 PM

## 2023-02-05 NOTE — PROCEDURES
Chest Tube Insertion Procedure Note    Indications:  Clinically significant Pneumothorax    Pre-operative Diagnosis: Pneumothorax    Post-operative Diagnosis: Pneumothorax    Procedure:  Right Chest tube placement    Surgeon: Lorna Stephens MD    Assistants: Ailin Noble CNP    Anesthesia: Local anesthesia 1% plain lidocaine    Procedure Details   Informed consent was obtained for the procedure, including sedation. Risks of lung perforation, hemorrhage, arrhythmia, and adverse drug reaction were discussed. After sterile skin prep, using standard technique, a 12 Trin[place] tube was placed in the right chest in the anterior axillary line at approximately the 5th rib space. Tube was secured with 2-0 silk. The chest tube was attached to pleurovac apparatus on suction. Small air leak was noted. Sterile occlusive chest wall dressing. Findings:  Gush of air and small amount of serosanguinous fluid     Estimated Blood Loss:  20cc                      Complications:  None; patient tolerated the procedure well. Condition: stable with mild respiratory distress, being transferred to the ICU. Post procedure xray showed tube in good position with resolution of the pneumothorax. Will leave to suction via pleurovac today. Daily chest xrays.     Electronically signed by Jamie Sales MD on 2/5/2023 at 1:26 PM

## 2023-02-05 NOTE — CONSULTS
Subjective:   CHIEF COMPLAINT / HPI:  74year old male with worsening sob,ac resp failure,increasing o2 requirements,need for vapotherm. he has severe diffuse wheeze bilaterally. He is tachypneic and using accessory muscles of breathing      Past Medical History:  Past Medical History:   Diagnosis Date    A-fib ([facility] [address]     on Coumadin    Abdominal pain 3/22/2012    Acute on chronic renal failure (HCC)     Allergic rhinitis     Bipolar affective disorder unspecified     possible schziophrenia per pt- Seeing Dr. Garrett Baldwin of left shoulder 2007    s/p injection     CAD (coronary artery disease)     see Leonidas    Cardiomyopathy ([facility] [place] [address]     NYHA FC II-III    CHF (NYHA class III, ACC/AHA stage C) ([facility] [address] 4/27/2012    Cholelithiasis 5/8/2012    per CT    Chronic kidney disease (CKD)     Chronic pain     on Ultram- dispensed at [facility] per CSP/psych program    Depression     Displacement of electrode lead of cardiac pacemaker 10/4/2019    10/4/2019 RV lead got pulled back in the connector    DJD (degenerative joint disease) of cervical spine     Elevated serum creatinine     Erectile dysfunction     Fractures     Left collar bone x8, left wrist, bilateral toes    GERD (gastroesophageal reflux disease)     GERD (gastroesophageal reflux disease)     H/O echocardiogram 03/26/15    EF 20% Mod dilated LV with severly reduced systolic function. pacer wire noted in right ventricle and right atrium.      Hiatal hernia 5/8/2012    per CT    History of nuclear stress test 05/01/2017    lexiscan-large anteroapical MI, no new changes, EF16%    Hypertension     Hypothyroid     Started by Dr. Rosa Chatterjee     Ischemic heart disease     Lipidemia 4/27/2012    Lithium toxicity     4/2015    MI, old 4/27/2012    Paronychia 3/12/2012    S/P cardiac cath 2/21/14    Schizoaffective disorder [facility])     old chart also gives hx of paranoid schizo    Unintentional weight loss 3/22/2012    Possible due to bipolar/tabby/ paranoia (sept 2012) with refusing to eat food from the grocery store believing it's being poisoned        Past Surgical History:        Procedure Laterality Date    ANGIOPLASTY      per old chart pt had angioplasty (LAD) with cutting balloon and arthrectomy done 7/2010    CARDIAC CATHETERIZATION      per old chart pt had cardiac cath 12/2010, 2/2011,4/2012, and 2/2014    CARDIAC SURGERY      arteriogram; stent    CHOLECYSTECTOMY      ENDOSCOPY, COLON, DIAGNOSTIC  9/2015    GASTRIC FUNDOPLICATION  28/97/3383    Robotic laparoscopic assisted nissen, cholecystectomy    HIATAL HERNIA REPAIR  10/7/15    PACEMAKER PLACEMENT      per old chart pt had biV ICD inserted 1/2011 and then 5/2014 had left ventricular lead replacement done    SINUS SURGERY      with allergy testing done in the past- Dr. April Artis (([place])    TONSILLECTOMY         Current Medications:    Current Facility-Administered Medications: ipratropium-albuterol (DUONEB) nebulizer solution 1 ampule, 1 ampule, Inhalation, 4x daily  morphine (PF) injection 1 mg, 1 mg, IntraVENous, Q4H PRN  Benzocaine-Menthol (CEPACOL) 1 lozenge, 1 lozenge, Oral, Q2H PRN  methylPREDNISolone sodium (SOLU-MEDROL) injection 40 mg, 40 mg, IntraVENous, Q12H  methylPREDNISolone sodium (SOLU-MEDROL) injection 125 mg, 125 mg, IntraVENous, Once  DOBUTamine (DOBUTREX) 500 mg in dextrose 5 % 250 mL infusion, 10 mcg/kg/min, IntraVENous, Continuous  potassium bicarb-citric acid (EFFER-K) effervescent tablet 20 mEq, 20 mEq, Oral, BID  doxycycline (VIBRAMYCIN) 100 mg in sodium chloride 0.9 % 100 mL IVPB, 100 mg, IntraVENous, Q12H  [COMPLETED] cefepime (MAXIPIME) 2,000 mg in sodium chloride 0.9 % 50 mL IVPB (mini-bag), 2,000 mg, IntraVENous, Once **FOLLOWED BY** cefepime (MAXIPIME) 2,000 mg in sodium chloride 0.9 % 50 mL IVPB (mini-bag), 2,000 mg, IntraVENous, Q8H  vancomycin (VANCOCIN) 1,500 mg in sodium chloride 0.9 % 500 mL IVPB, 1,500 mg, IntraVENous, Q24H  sacubitril-valsartan (ENTRESTO) 24-26 MG per tablet 1 tablet, 1 tablet, Oral, BID  midodrine (PROAMATINE) tablet 10 mg, 10 mg, Oral, TID WC  furosemide (LASIX) 100 mg in sodium chloride 0.9 % 100 mL infusion, 5 mg/hr, IntraVENous, Continuous  sodium chloride flush 0.9 % injection 5-40 mL, 5-40 mL, IntraVENous, 2 times per day  sodium chloride flush 0.9 % injection 5-40 mL, 5-40 mL, IntraVENous, PRN  0.9 % sodium chloride infusion, , IntraVENous, PRN  polyethylene glycol (GLYCOLAX) packet 17 g, 17 g, Oral, Daily PRN  acetaminophen (TYLENOL) tablet 650 mg, 650 mg, Oral, Q6H PRN **OR** acetaminophen (TYLENOL) suppository 650 mg, 650 mg, Rectal, Q6H PRN  potassium chloride (KLOR-CON M) extended release tablet 40 mEq, 40 mEq, Oral, PRN **OR** potassium bicarb-citric acid (EFFER-K) effervescent tablet 40 mEq, 40 mEq, Oral, PRN **OR** potassium chloride 10 mEq/100 mL IVPB (Peripheral Line), 10 mEq, IntraVENous, PRN  magnesium sulfate 2000 mg in 50 mL IVPB premix, 2,000 mg, IntraVENous, PRN  atorvastatin (LIPITOR) tablet 80 mg, 80 mg, Oral, Daily  levothyroxine (SYNTHROID) tablet 75 mcg, 75 mcg, Oral, Daily  aspirin chewable tablet 81 mg, 81 mg, Oral, Daily  warfarin placeholder: dosing by pharmacy, , Other, RX Placeholder  calcium carbonate (TUMS) chewable tablet 500 mg, 500 mg, Oral, TID PRN    Allergies   Allergen Reactions    Penicillins Swelling    Azithromycin     Clozapine [Clozapine] Swelling    Haldol [Haloperidol Lactate]     Insulins      Per ecf    Niacin And Related     Ultram [Tramadol]     Zomig [Zolmitriptan]     Codeine Nausea And Vomiting    Depakote [Divalproex Sodium] Other (See Comments)     ' makes eyeball fall out\"    Haldol [Haloperidol Lactate]      Hand and leg tremors with palpitation    Hydrocodone Nausea And Vomiting    Ibuprofen Anxiety     Causes pt to pass out    Imitrex [Sumatriptan] Anxiety    Maxalt [Rizatriptan] Nausea And Vomiting     Makes HA worse    Neurontin [Gabapentin] Other (See Comments)     \" makes eyeballs shrink\"       Social History:    Social History     Socioeconomic History    Marital status:    Tobacco Use    Smoking status: Every Day     Packs/day: 0.50     Years: 46.00     Pack years: 23.00     Types: Cigarettes    Smokeless tobacco: Never   Vaping Use    Vaping Use: Never used   Substance and Sexual Activity    Alcohol use: No     Alcohol/week: 0.0 standard drinks    Drug use: No    Sexual activity: Not Currently       Family History:   Family History   Problem Relation Age of Onset    Mental Illness Mother         substance abuse    Mental Illness Sister     Mental Illness Sister        Immunization:  Immunization History   Administered Date(s) Administered    COVID-19, PFIZER Bivalent BOOSTER, DO NOT Dilute, (age 12y+), IM, 30 mcg/0.3 mL 09/28/2022    COVID-19, PFIZER GRAY top, DO NOT Dilute, (age 15 y+), IM, 30 mcg/0.3 mL 05/12/2022    COVID-19, PFIZER PURPLE top, DILUTE for use, (age 15 y+), 30mcg/0.3mL 12/30/2020, 01/20/2021    Influenza 11/01/2010, 09/20/2011    Influenza Virus Vaccine 10/22/2013, 10/09/2015    Influenza, FLUARIX, FLULAVAL, FLUZONE (age 10 mo+) AND AFLURIA, (age 1 y+), PF, 0.5mL 09/19/2017    Pneumococcal Polysaccharide ([id number]) 07/31/2013    Tdap (Boostrix, Adacel) 03/12/2012         REVIEW OF SYSTEMS:    CONSTITUTIONAL:  negative for fevers, chills, diaphoresis, activity change, appetite change, fatigue, night sweats and unexpected weight change.    EYES:  negative for blurred vision, eye discharge, visual disturbance and icterus  HEENT:  negative for hearing loss, tinnitus, ear drainage, sinus pressure, nasal congestion, epistaxis and snoring  RESPIRATORY:  See HPI  CARDIOVASCULAR:  negative for chest pain, palpitations, exertional chest pressure/discomfort, edema, syncope  GASTROINTESTINAL:  negative for nausea, vomiting, diarrhea, constipation, blood in stool and abdominal pain  GENITOURINARY:  negative for frequency, dysuria and hematuria  HEMATOLOGIC/LYMPHATIC:  negative for easy bruising, bleeding and lymphadenopathy  ALLERGIC/IMMUNOLOGIC:  negative for recurrent infections, angioedema, anaphylaxis and drug reactions  ENDOCRINE:  negative for weight changes and diabetic symptoms including polyuria, polydipsia and polyphagia    MUSCULOSKELETAL:  negative for  pain, joint swelling, decreased range of motion and muscle weakness  NEUROLOGICAL:  negative for headaches, slurred speech, unilateral weakness  PSYCHIATRIC/BEHAVIORAL: negative for hallucinations, behavioral problems, confusion and agitation. Objective:   PHYSICAL EXAM:      VITALS:    Vitals:    02/05/23 1030 02/05/23 1040 02/05/23 1050 02/05/23 1053   BP: (!) 91/35 (!) 99/35 (!) 97/39    Pulse: 85 86 89 94   Resp: 24 26 26 27   Temp:       TempSrc:       SpO2:       Weight:       Height:             CONSTITUTIONAL:  awake, alert, cooperative, no apparent distress, and appears stated age  NECK:  Supple, symmetrical, trachea midline, no adenopathy, thyroid symmetric, not enlarged and no tenderness  CHEST: Chest expansion equal and symmetrical, no intercostal retraction. LUNGS:  no increased work of breathing, has expiratory wheezes both lungs, no crackles. CARDIOVASCULAR: S1 and S2, no edema and no JVD  ABDOMEN:  normal bowel sounds, non-distended and no masses palpated, and no tenderness to palpation. No hepatospleenomegaly  LYMPHADENOPATHY:  no axillary or supraclavicular adenopathy. No cervical adnenopathy  PSYCHIATRIC: Oriented to person place and time. No obvious depression or anxiety. MUSCULOSKELETAL: No obvious misalignment or effusion of the joints. No clubbing, cyanosis of the digits. RIGHT AND LEFT LOWER EXTREMITIES: No edema, no inflammation, no tenderness. SKIN:  normal skin color, texture, turgor and no redness, warmth, or swelling.  No palpable nodules    DATA:       EXAMINATION:   CT OF THE CHEST WITHOUT CONTRAS, 2/4/2023 10:55 am       TECHNIQUE:   CT of the chest was performed without the administration of intravenous contrast. Multiplanar reformatted images are provided for review. Automated   exposure control, iterative reconstruction, and/or weight based adjustment of   the mA/kV was utilized to reduce the radiation dose to as low as reasonably   achievable. COMPARISON:   Chest radiograph 02/03/2022       HISTORY:   ORDERING SYSTEM PROVIDED HISTORY:  SOB   TECHNOLOGIST PROVIDED HISTORY:   REASON FOR exam:  SOB       FINDINGS:   Evaluation for neoplasm, infection, focal lesions, and trauma is limited   without the use of IV contrast.       Mediastinum: Cardiomegaly with small pericardial effusion. Dilation of the   pulmonary trunk measuring 3.3 cm can be seen with pulmonary artery   hypertension. There is atherosclerosis of the aorta and coronary arteries. Streak artifact from left-sided cardiac conduction device and leads limit   evaluation. Suspected enlarged paratracheal and subcarinal lymph nodes. Lungs/Pleura: Mild tracheobronchial secretions. Moderate-sized bilateral   dependent layering pleural effusions. No pneumothorax. Diffuse interstitial   and alveolar opacities are seen bilaterally with air bronchograms, primarily   within an upper lobe predominant distribution with relative sparing of the   left lower lobe. The right pleural effusion extends into the major fissure   laterally. Upper Abdomen: No acute process within the upper abdomen identified. Soft Tissues/Bones:  Diffuse demineralization and multifocal degenerative   changes of the osseous structures. Mild diffuse body wall edema. Impression   Diffuse ground-glass and interstitial opacities could represent edema and/or   multifocal infection, including atypical etiologies. Moderate-sized bilateral pleural effusions. Cardiomegaly with small pericardial effusion. Lymphadenopathy, nonspecific and possibly reactive. Order History    Open Order Details                       Assessment:      Ac hypoxemic resp failure  Ac on ch chf with ef 25%  Poss pneumonia  Mod francie pl effusion  Ac copd  Iatrogenic right pneumothorax          Plan:     D/w pt  Adequate o2 adm  Bd rx  Iv steroids  Will give extra dose of solumedrol  Refusing breathing rx  Vapotherm if needed  Will need right chest tube placement  He will be better served in icu  I agree that he definitely has some underlying pneumonia so continue with double antibx  D/w dr Michele Dears  Thanks will follow

## 2023-02-05 NOTE — PROGRESS NOTES
V2.0  [facility]ist Progress Note      Name:  Shaka Greenwood /Age/Sex: 1955  (74 y.o. male)   MRN & CSN:  [id number] & [id number] Encounter Date/Time: 2023 6:54 PM EST    Location:  [address] [place] [address] PCP: Ruiz Velasquez MD       Hospital Day: 4    Assessment and Plan:   Shaka Greenwood is a 74 y.o. male who presents with Heart failure ([address]    Patient does not have capacity to make his own decisions as he does not have insight or understand the implications of his decisions. He has a legal guardian whose information is in the chart. Please reach out to the legal guardian with any concerning matters or decisions. Plan:    Acute hypoxic respiratory failure  Secondary to acute on chronic systolic/diastolic heart failure  Ischemic cardiomyopathy status post AICD  Evidenced by fluid overload on clinical exam and chest x-ray, proBNP 4675. Required BiPAP due to increased work of breathing on arrival, transitioned to HFNC.   -Echocardiogram 2021: Ejection fraction 10%, severely dilated left ventricle, grade 3 diastolic dysfunction, mild to moderate MR. Repeat TTE this visit with LVEF 25%.   -Lasix drip and dobutamine initiated (patient remained hypotensive to SBP 80s with symptom of LH when milrinone was attempted), hold BB  -Uptitrate lasix gtt as able, raised from 2.5 to 5 this afternoon,  -Urine output 2.7L in past 24 hrs  -CT with diffuse groundglass and interstitial opacities, moderate bilateral pleural effusions  -Monitor intake and output, Daily weights  -Strict bedrest  -Low-sodium diet  -CXR and pro-BNP tomorrow AM  -Cardiology following: Recommend transfer to ICU  -Discussed with intensivist: No indication to transfer patient to ICU at this time however if patient is unable to tolerate aggressive diuresis and requires pressor support for improvement then he may be transferred to ICU, discussed with patient's legal guardian who is agreeable to proceeding with central line placement and pressor administration if determined to be beneficial, she also clarified CODE STATUS to be DNR CCA    Severe sepsis -possibly 2/2 pneumonia  Respiratory distress on arrival.  Intermittently hypotensive, high grade fever (101.2) on 2/2 PM, leukocytosis to 14, lactate 3.4 (but down trended with diuretics). Procalcitonin up from 0.165-1.30.  UA and urine culture negative. Initial CXR with diffuse groundglass airspace opacities concerning for infection. Respiratory panel negative.   -Repeat CXR with interval progression of diffuse interstitial and alveolar opacities in bilateral lungs, likely CHF however infection difficult to rule out thus will obtain CT chest without contrast to better visualize  -Blood cultures with 1/4 bottles positive for staph epidermidis, likely contaminant; repeat blood cultures negative; TTE negative for vegetations  -Would not administer sepsis fluids at this time as patient has very poor heart function and is in acute heart failure exacerbation at present with active need for fluid removal  -Maintain broad-spectrum antibiotics [vancomycin and cefepime, will add doxycycline for atypical coverage (cannot do azithromycin or levaquin as qtc prolonged)]  -Follow respiratory studies  -Follow inflammatory markers (procalal trending up)     Hypokalemia  -Monitor closely while on diuretics  -Repeat BMP tonight    CKD stage III  Baseline creatinine 1.3-1.4  Creatinine appears to be at baseline  -Monitor renal function parameters  -Avoid nephrotoxic agents     Paroxysmal atrial fibrillation  Long-term use of Coumadin  Supratherapeutic INR  INR 4.29  -Cardiology stopped amiodarone as concern for lung toxicity   -Hold Toprol as patient requiring dobutamine  -Pharmacy to dose Coumadin    Coronary artery disease status post LAD PCI in 2013 at [facility]  -Continue aspirin and statin     Hypothyroidism  -Continue Synthroid home regimen     Hypertension -patient hypotensive  -Hold BB as patient on dobutamine    Hyperlipidemia  -Continue statin    Tobacco abuse  - regarding cessation      Diet ADULT ORAL NUTRITION SUPPLEMENT; Breakfast, Dinner; Standard High Calorie/High Protein Oral Supplement  ADULT DIET; Easy to Chew; Low Fat/Low Chol/High Fiber/2 gm Na; Low Sodium (2 gm); 1500 ml   DVT Prophylaxis [] Lovenox, []  Heparin, [] SCDs, [] Ambulation,  [] Eliquis, [] Xarelto  [x] Coumadin   Code Status DNR-CCA   Disposition From: Home  Expected Disposition: [facility]  Estimated Date of Discharge: 3-4 days  Patient requires continued admission due to requiring IV diuresis and IV inotropes, IV antibiotics   Surrogate Decision Maker/ POA      Subjective:     Chief Complaint: Shortness of Breath (Started 3 hrs ago)     No more fevers and patient states she feels well however his imaging and labs are concerning for decline in clinical status. On 6 L NC, awake/alert/interactive however states that he wants to go home. Discussed with him that this is not a good idea due to the severity of his illness at which point he says that he is fine and there is nothing wrong with him. Discussed care with legal guardian who confirmed that patient should be DNR CCA at this time. Review of Systems:    Review of Systems    Negative except as above. Objective: Intake/Output Summary (Last 24 hours) at 2/4/2023 1923  Last data filed at 2/4/2023 1835  Gross per 24 hour   Intake --   Output 1925 ml   Net -1925 ml          Vitals:   Vitals:    02/04/23 1850   BP: (!) 117/55   Pulse: 92   Resp: 25   Temp:    SpO2: 90%       Physical Exam:     General: NAD  Eyes: EOMI  ENT: neck supple  Cardiovascular: Regular rate. Respiratory: Clear to auscultation  Gastrointestinal: Soft, non tender  Genitourinary: no suprapubic tenderness  Musculoskeletal: No edema  Skin: Extremities are warm, dry  Neuro: Alert. Psych: Mood appropriate.      Medications:   Medications:    potassium bicarb-citric acid  20 mEq Oral BID cefepime  2,000 mg IntraVENous Q8H    vancomycin  1,500 mg IntraVENous Q24H    sacubitril-valsartan  1 tablet Oral BID    midodrine  10 mg Oral TID WC    sodium chloride flush  5-40 mL IntraVENous 2 times per day    atorvastatin  80 mg Oral Daily    levothyroxine  75 mcg Oral Daily    aspirin  81 mg Oral Daily    warfarin placeholder: dosing by pharmacy   Other RX Placeholder      Infusions:    DOBUTamine 10 mcg/kg/min (02/04/23 1001)    furosemide (LASIX) 1mg/mL infusion 5 mg/hr (02/04/23 6477)    sodium chloride       PRN Meds: sodium chloride flush, 5-40 mL, PRN  sodium chloride, , PRN  polyethylene glycol, 17 g, Daily PRN  acetaminophen, 650 mg, Q6H PRN   Or  acetaminophen, 650 mg, Q6H PRN  potassium chloride, 40 mEq, PRN   Or  potassium alternative oral replacement, 40 mEq, PRN   Or  potassium chloride, 10 mEq, PRN  magnesium sulfate, 2,000 mg, PRN  calcium carbonate, 500 mg, TID PRN      Labs      Recent Results (from the past 24 hour(s))   Lactic Acid    Collection Time: 02/03/23  8:24 PM   Result Value Ref Range    Lactate 1.4 0.5 - 1.9 mMOL/L   Vancomycin Level, Trough    Collection Time: 02/04/23 12:26 AM   Result Value Ref Range    Vancomycin Tr 7.5 (L) 10 - 20 UG/ML    DOSE AMOUNT DOSE AMT.  GIVEN - 1500 mg     DOSE TIME DOSE TIME GIVEN - 4376    Basic Metabolic Panel    Collection Time: 02/04/23 12:26 AM   Result Value Ref Range    Sodium 136 135 - 145 MMOL/L    Potassium 3.6 3.5 - 5.1 MMOL/L    Chloride 102 99 - 110 mMol/L    CO2 26 21 - 32 MMOL/L    Anion Gap 8 4 - 16    BUN 24 (H) 6 - 23 MG/DL    Creatinine 1.3 0.9 - 1.3 MG/DL    Est, Glom Filt Rate >60 >60 mL/min/1.73m2    Glucose 111 (H) 70 - 99 MG/DL    Calcium 7.5 (L) 8.3 - 10.6 MG/DL   Magnesium    Collection Time: 02/04/23 12:26 AM   Result Value Ref Range    Magnesium 1.9 1.8 - 2.4 mg/dl   Brain Natriuretic Peptide    Collection Time: 02/04/23 12:26 AM   Result Value Ref Range    Pro-BNP 8,271 (H) <300 PG/ML   Protime-INR    Collection Time: 02/04/23 12:26 AM   Result Value Ref Range    Protime 51.8 (H) 11.7 - 14.5 SECONDS    INR 3.96 INDEX   CBC with Auto Differential    Collection Time: 02/04/23 12:26 AM   Result Value Ref Range    WBC 13.2 (H) 4.0 - 10.5 K/CU MM    RBC 3.31 (L) 4.6 - 6.2 M/CU MM    Hemoglobin 10.3 (L) 13.5 - 18.0 GM/DL    Hematocrit 30.8 (L) 42 - 52 %    MCV 93.1 78 - 100 FL    MCH 31.1 (H) 27 - 31 PG    MCHC 33.4 32.0 - 36.0 %    RDW 14.4 11.7 - 14.9 %    Platelets 959 790 - 202 K/CU MM    MPV 11.1 7.5 - 11.1 FL    Differential Type AUTOMATED DIFFERENTIAL     Segs Relative 83.3 (H) 36 - 66 %    Lymphocytes % 6.2 (L) 24 - 44 %    Monocytes % 9.4 (H) 0 - 4 %    Eosinophils % 0.5 0 - 3 %    Basophils % 0.1 0 - 1 %    Segs Absolute 11.0 K/CU MM    Lymphocytes Absolute 0.8 K/CU MM    Monocytes Absolute 1.2 K/CU MM    Eosinophils Absolute 0.1 K/CU MM    Basophils Absolute 0.0 K/CU MM    Nucleated RBC % 0.0 %    Total Nucleated RBC 0.0 K/CU MM    Total Immature Neutrophil 0.07 K/CU MM    Immature Neutrophil % 0.5 (H) 0 - 0.43 %   C-Reactive Protein    Collection Time: 02/04/23 12:26 AM   Result Value Ref Range    CRP High Sensitivity 301.2 (H) <5.0 mg/L   C-Reactive Protein    Collection Time: 02/04/23 12:26 AM   Result Value Ref Range    CRP High Sensitivity 314.9 (H) <5.0 mg/L   Procalcitonin    Collection Time: 02/04/23 12:26 AM   Result Value Ref Range    Procalcitonin 1.27    Blood gas, arterial    Collection Time: 02/04/23 10:30 AM   Result Value Ref Range    pH, Bld 7.51 (H) 7.34 - 7.45    pCO2, Arterial 31.0 (L) 32 - 45 MMHG    pO2, Arterial 90 75 - 100 MMHG    Base Exc, Mixed 2.3 (H) 0 - 1.2    HCO3, Arterial 24.7 (H) 18 - 23 MMOL/L    CO2 Content 25.7 (H) 19 - 24 MMOL/L    O2 Sat 95.6 (L) 96 - 97 %    Carbon Monoxide, Blood 1.6 0 - 5 %    Methemoglobin, Arterial 1.2 <1.5 %    Comment 6LPM    Troponin    Collection Time: 02/04/23  5:04 PM   Result Value Ref Range    Troponin T 0.055 (H) <0.01 NG/ML   Lactic Acid    Collection Time: 02/04/23  5:04 PM   Result Value Ref Range    Lactate 2.0 (HH) 0.5 - 1.9 mMOL/L        Imaging/Diagnostics Last 24 Hours   XR CHEST (2 VW)    Result Date: 2/2/2023  EXAMINATION: TWO XRAY VIEWS OF THE CHEST 2/2/2023 10:38 am COMPARISON: 02/01/2022 HISTORY: ORDERING SYSTEM PROVIDED HISTORY: chf TECHNOLOGIST PROVIDED HISTORY: Reason for exam:->chf Reason for Exam: chf FINDINGS: There is a left-sided transvenous pacer in place and bilateral pulmonary infiltrates persist unchanged when compared to the prior study. Improved aeration in the lingula     Improved aeration in the lingula. Otherwise stable exam     XR CHEST PORTABLE    Result Date: 2/2/2023  EXAMINATION: ONE X-RAY VIEW OF THE CHEST 2/1/2023 12:29 am COMPARISON: May 21, 2022 HISTORY: ORDERING SYSTEM PROVIDED HISTORY:  Hypoxia, shortness of breath; concern for infectious process or volume overload. TECHNOLOGIST PROVIDED HISTORY: Reason for Exam:  Hypoxia, shortness of breath; concern for infectious process or volume overload. FINDINGS: Left chest wall AICD in place. Cardiomediastinal silhouette is enlarged. The lungs show diffuse ground-glass airspace opacities without significant effusions. No pneumothorax. No acute or aggressive osseous lesion. 1. Diffuse ground-glass airspace opacities concerning for infection over edema. 2. Cardiomegaly.        Electronically signed by Sandra Wilson MD on 2/4/2023 at 7:23 PM

## 2023-02-06 NOTE — PROCEDURES
Central Venous Catheter and Mora Leech Catheter Insertion Procedure Note   Procedure: Insertion of Central Venous Catheter and Pulmonary Artery Catheter   Procedure Clinician: Keiry Vergara MD   Procedure Assistant: None   Indications: Cardiogenic shock   Size and location: 9Fr Cordis   Attempts: 1   Procedure Details:   Emergent  Under sterile conditions the skin above the RIJ was prepped with chlorhexidine and covered with a sterile drape. Local anesthesia was applied to the skin and subcutaneous tissues. Ultrasound was used to localize the vein and an 18-gauge needle was then inserted into the vein. A drop test was performed and was negative for any evidence of arterial flow. A guide wire was then passed easily through the catheter. There were no arrhythmias. Ultrasound was used to identify the guidewire once in the vein. The catheter was then withdrawn. A 9Fr Cordis was then inserted into the vessel over the guide wire. The guidewire was then removed with the tip intact, and witnessed by bedside nurse. The catheter was sutured into place. A triple lumen continuous cardiac output [place]-Petros catheter was brought onto the field and each line flushed with sterile saline and the SVO2 sensor calibrated. The catheter was inserted thru the introducer of the central line to a distance of 12 cm. The balloon was inflated without resistance and the catheter was incrementally advanced through the right ventricle and into the pulmonary after visualization of the RA, RV, and PA waveforms. A wedge pressure tracing was then obtained and the catheter was then wedge at 52 cm. The wedge pressure was 36. The balloon was then deflated and verification of return of a pulmonary artery pressure tracing was made. During the floating procedure to position the catheter, the position of the catheter tip was determined by continuous pressure monitoring via the distal port. This was accomplished on the first pass without resistance.  The catheter was locked to the central line with the tip inserted to a distance of 48 cm and a sterile dressing was applied. Estimated Blood Loss: minimal  The patient tolerated the procedure well and there were no complications. Initial pressure measurements were:  CVP 18 mmHg,    RV 55 mmHg  PA 58/35 () with PCWP 36 mmHg  with CO 4.8 L/min, CI 3.0    These values obtained with the patient receiving dobutamine 5 mcg/min, epinephrine @ 5 mcg/min, Levophed 35 mcg/min    Findings: There were no changes to vital signs. Catheter was flushed with 20 mL NS. Patient tolerated the procedure well. Lung ultrasound:   Pleural sliding - yes  Lung point- no   A lines - yes  B lines - yes  Consolidation? Not assessed   Pleural effusion? Not assessed     Recommendations:   CXR ordered to verify placement.

## 2023-02-06 NOTE — PROGRESS NOTES
[facility]    PATIENT: Frederic Sue, 74 y.o., male, MRN: [id number]    Hospital Day:  LOS: 5 days ,                 Subjective:    Diet: ADULT ORAL NUTRITION SUPPLEMENT; Breakfast, Dinner; Standard High Calorie/High Protein Oral Supplement  ADULT DIET; Easy to Chew; Low Fat/Low Chol/High Fiber/2 gm Na; Low Sodium (2 gm); 1500 ml    Patient s/p CVC placement; stat chest xray showed pneumo. Chest tube placed with resolution of penumothorax. Patient O2 in the low 90's after placement. He was transferred to ICU. Overnight, he had a rhythm change of Vtach with extreme tachypnea and hypoxia, ultimately requiring intubation. Chest tube in place with dry dressing to suction via pleurovac. Objective:    Vitals: BP (!) 100/52   Pulse (!) 108   Temp 100.4 °F (38 °C) (Core)   Resp 23   Ht 6' 4.5\" (1.943 m)   Wt 177 lb 14.6 oz (80.7 kg)   SpO2 94%   BMI 21.37 kg/m²     I/O: 02/04 1901 - 02/06 0700  In: 5216.9 [P.O.:700;  I.V.:1283.3]  Out: 2796 [Urine:2450]    Physical Exam:  General Appearance:   Intubated   Head:   Normocephalic, atraumatic    Lungs:    Wheezing   Heart:   Tachycardic    Abdomen:    Soft, non-tender, no rebound or guarding    Extremities:  No cyanosis or edema             Labs/Imaging Results:   Recent Results (from the past 24 hour(s))   Lactic Acid    Collection Time: 02/05/23  2:30 PM   Result Value Ref Range    Lactate 1.8 0.5 - 1.9 mMOL/L   Troponin    Collection Time: 02/05/23  2:30 PM   Result Value Ref Range    Troponin T 0.070 (H) <0.01 NG/ML   Blood gas, arterial    Collection Time: 02/05/23  9:15 PM   Result Value Ref Range    pH, Bld 7.18 (L) 7.34 - 7.45    pCO2, Arterial 62.0 (H) 32 - 45 MMHG    pO2, Arterial 196 (H) 75 - 100 MMHG    Base Excess 6 (H) 0 - 3.3    HCO3, Arterial 23.1 (H) 18 - 23 MMOL/L    CO2 Content 25.0 (H) 19 - 24 MMOL/L    O2 Sat 96.8 96 - 97 %    Carbon Monoxide, Blood 1.4 0 - 5 %    Methemoglobin, Arterial 1.2 <1.5 %    Comment AC/VC, 18, 500, +10, FIO2 100%    Blood gas, arterial    Collection Time: 02/05/23 10:45 PM   Result Value Ref Range    pH, Bld 7.31 (L) 7.34 - 7.45    pCO2, Arterial 45.0 32 - 45 MMHG    pO2, Arterial 205 (H) 75 - 100 MMHG    Base Excess 4 (H) 0 - 3.3    HCO3, Arterial 22.7 18 - 23 MMOL/L    CO2 Content 24.1 (H) 19 - 24 MMOL/L    O2 Sat 96.2 96 - 97 %    Carbon Monoxide, Blood 1.2 0 - 5 %    Methemoglobin, Arterial 1.7 (H) <1.5 %    Comment AC/VC. 22, 500, FIO2 100%, +10    Blood Gas, Venous    Collection Time: 02/06/23 12:00 AM   Result Value Ref Range    pH, Gilberto 7.24 (L) 7.32 - 7.42    pCO2, Gilberto 57 (H) 38 - 52 mmHG    pO2, Gilberto 51 (H) 28 - 48 mmHG    Base Excess 4 (H) 0 - 3.3    HCO3, Venous 24.4 19 - 25 MMOL/L    O2 Sat, Gilberto 80.9 (H) 50 - 70 %    Comment AC/VC 22, 500, FIO2 100%, +10    Lactic Acid    Collection Time: 02/06/23 12:05 AM   Result Value Ref Range    Lactate 3.2 (HH) 0.5 - 1.9 mMOL/L   Critical Care Panel    Collection Time: 02/06/23 12:40 AM   Result Value Ref Range    Sodium 133 (L) 135 - 145 MMOL/L    Potassium 3.5 3.5 - 5.1 MMOL/L    Chloride 95 (L) 99 - 110 mMol/L    CO2 21 21 - 32 MMOL/L    Anion Gap 17 (H) 4 - 16    BUN 33 (H) 6 - 23 MG/DL    Creatinine 1.9 (H) 0.9 - 1.3 MG/DL    Est, Glom Filt Rate 39 (L) >60 mL/min/1.73m2    Glucose 247 (H) 70 - 99 MG/DL    Calcium 8.1 (L) 8.3 - 10.6 MG/DL    Phosphorus 6.0 (H) 2.5 - 4.9 MG/DL    Magnesium 2.8 (H) 1.8 - 2.4 mg/dl   Hepatic Function Panel    Collection Time: 02/06/23 12:40 AM   Result Value Ref Range    Albumin 3.3 (L) 3.4 - 5.0 GM/DL    Total Bilirubin 0.7 0.0 - 1.0 MG/DL    Bilirubin, Direct 0.4 (H) 0.0 - 0.3 MG/DL    Bilirubin, Indirect 0.3 0 - 0.7 MG/DL    Alkaline Phosphatase 96 40 - 129 IU/L    AST 72 (H) 15 - 37 IU/L    ALT 53 (H) 10 - 40 U/L    Total Protein 5.7 (L) 6.4 - 8.2 GM/DL   Blood gas, arterial    Collection Time: 02/06/23  4:45 AM   Result Value Ref Range    pH, Bld 7.29 (L) 7.34 - 7.45 pCO2, Arterial 42.0 32 - 45 MMHG    pO2, Arterial 161 (H) 75 - 100 MMHG    Base Excess 6 (H) 0 - 3.3    HCO3, Arterial 20.2 18 - 23 MMOL/L    CO2 Content 21.5 19 - 24 MMOL/L    O2 Sat 97.0 96 - 97 %    Carbon Monoxide, Blood 1.1 0 - 5 %    Methemoglobin, Arterial 1.1 <1.5 %    Comment AC/VC, 22, 500, 70%, +10    CBC with Auto Differential    Collection Time: 02/06/23  5:10 AM   Result Value Ref Range    WBC 15.0 (H) 4.0 - 10.5 K/CU MM    RBC 3.22 (L) 4.6 - 6.2 M/CU MM    Hemoglobin 9.9 (L) 13.5 - 18.0 GM/DL    Hematocrit 30.4 (L) 42 - 52 %    MCV 94.4 78 - 100 FL    MCH 30.7 27 - 31 PG    MCHC 32.6 32.0 - 36.0 %    RDW 14.9 11.7 - 14.9 %    Platelets 603 344 - 301 K/CU MM    MPV 11.1 7.5 - 11.1 FL    Differential Type AUTOMATED DIFFERENTIAL     Segs Relative 89.6 (H) 36 - 66 %    Lymphocytes % 1.5 (L) 24 - 44 %    Monocytes % 8.0 (H) 0 - 4 %    Eosinophils % 0.0 0 - 3 %    Basophils % 0.1 0 - 1 %    Segs Absolute 13.4 K/CU MM    Lymphocytes Absolute 0.2 K/CU MM    Monocytes Absolute 1.2 K/CU MM    Eosinophils Absolute 0.0 K/CU MM    Basophils Absolute 0.0 K/CU MM    Nucleated RBC % 0.0 %    Total Nucleated RBC 0.0 K/CU MM    Total Immature Neutrophil 0.12 K/CU MM    Immature Neutrophil % 0.8 (H) 0 - 0.43 %   Critical Care Panel    Collection Time: 02/06/23  5:10 AM   Result Value Ref Range    Sodium 134 (L) 135 - 145 MMOL/L    Potassium 4.0 3.5 - 5.1 MMOL/L    Chloride 94 (L) 99 - 110 mMol/L    CO2 19 (L) 21 - 32 MMOL/L    Anion Gap 21 (H) 4 - 16    BUN 39 (H) 6 - 23 MG/DL    Creatinine 2.5 (H) 0.9 - 1.3 MG/DL    Est, Glom Filt Rate 28 (L) >60 mL/min/1.73m2    Glucose 200 (H) 70 - 99 MG/DL    Calcium 8.1 (L) 8.3 - 10.6 MG/DL    Phosphorus 7.3 (H) 2.5 - 4.9 MG/DL    Magnesium 2.8 (H) 1.8 - 2.4 mg/dl   Lactic Acid    Collection Time: 02/06/23  5:10 AM   Result Value Ref Range    Lactate 3.1 (HH) 0.5 - 1.9 mMOL/L   Protime/INR & PTT    Collection Time: 02/06/23  5:10 AM   Result Value Ref Range    Protime 52.9 (H) 11.7 - 14.5 SECONDS    INR 4.04 INDEX    aPTT 58.1 (H) 25.1 - 37.1 SECONDS   Hepatic Function Panel    Collection Time: 02/06/23  5:10 AM   Result Value Ref Range    Albumin 3.3 (L) 3.4 - 5.0 GM/DL    Total Bilirubin 1.0 0.0 - 1.0 MG/DL    Bilirubin, Direct 0.6 (H) 0.0 - 0.3 MG/DL    Bilirubin, Indirect 0.4 0 - 0.7 MG/DL    Alkaline Phosphatase 93 40 - 129 IU/L     (H) 15 - 37 IU/L     (H) 10 - 40 U/L    Total Protein 5.6 (L) 6.4 - 8.2 GM/DL   Blood Gas, Venous    Collection Time: 02/06/23  6:00 AM   Result Value Ref Range    pH, Gilberto 7.27 (L) 7.32 - 7.42    pCO2, Gilberto 45 38 - 52 mmHG    pO2, Gilberto 143 (H) 28 - 48 mmHG    Base Excess 6 (H) 0 - 3.3    HCO3, Venous 20.7 19 - 25 MMOL/L    O2 Sat, Gilberto 94.3 (H) 50 - 70 %    Comment AC/VC, 22, 500, 70%, +10          Assessment:  Jim Bence is a 74 y.o. male placement of CVC with pneumothorax and chest tube placement. Respiratory failure, ARDS, cardiogenic shock    Keep chest tube to suction today.    Parth chest xrays    Corinne Patient, APRN-CNP  2/6/2023  9:16 AM

## 2023-02-06 NOTE — PROGRESS NOTES
Latest Reference Range & Units 2/6/23 06:00   Base Excess 0 - 3.3  6 (H)   pH, Gilberto 7.32 - 7.42  7.27 (L)   pCO2, Gilberto 38 - 52 mmHG 45   pO2, Gilberto 28 - 48 mmHG 143 (H)   HCO3, Venous 19 - 25 MMOL/L 20.7   O2 Sat, Gilberto 50 - 70 % 94.3 (H)   (H): Data is abnormally high  (L): Data is abnormally low    VBG results given to RN.

## 2023-02-06 NOTE — CONSULTS
History and Physical 23        NAME: Valeria Lewis  : 1955  MRN: [id number]      Assessment/Plan:  Valeria Lewis is a 74 y.o. male with a history of A. fib CKD, GERD, hypotension, hyperlipidemia, bipolar disorder, cardiomyopathy, CHF NYHA class III/IV, ischemic cardiomyopathy who presented to [facility] 2023 with shortness of breath, acute worsening of his hypoxia and increased oxygen requirement after noted procedure on the floor leading to pneumothorax requiring chest tube. Patient transferred to the ICU for further management of his medical condition. Problem list  Cardiogenic shock  Acute on Chronic heart failure exacerbation  Hypoxic respiratory failure  Pneumothorax  ARDS, severe PF < 200  IRVIN  Cardiorenal syndrome  Decision-making capabilities or capacity. Neuro: Alert, oriented, confused overall and does not have capability to make his own decision. Appears to be at baseline compared to prior  Cardio: Cardiogenic shock from acute decompensated chronic heart failure. Patient lives with blood pressures in 90s over 50s at baseline given his extensive cardiomyopathy. Continue aggressive diuresis with Lasix infusion at 10 mg/h, additional vasoactive and inotropic support with Levophed and dobutamine. Noted increased requirement and vasoactive agent support, decrease dobutamine to 5 mcg/kg/min, hold Entresto. Titrate vasoactive agents to SBP> 90 mmHg. Tachyarrhythmias noted while he was on the floor, amiodarone was considered though discontinued given LFT abnormalities. Continue Lasix drip if no response switch to Bumex. Follow-up repeat echo. Cardiology consulted, recs appreciated. Resp: Acute respiratory failure, likely from worsening cardiogenic shock with increased pulmonary edema. Doing well on high flow nasal cannula and diuresis. Pneumothorax, iatrogenic status post CVC placement. Chest tube to -20. Continue management. Serial chest x-ray for monitoring.   GI: N.p.o. at this time, advance diet as tolerated fluid restriction. GI prophylaxis. Deranged LFTs likely from hepatic congestion from acute decompensation of heart failure. Monitor. : Acute kidney injury, cardiorenal syndrome, augment with diuresis and inotropic and vasoactive agents support. To augment diuresis. Continue Lasix drip at 10 mg/h if no appropriate response switch to Bumex for trial.  Monitor electrolytes closely and replenish as needed. Keep potassium greater than 4, magnesium greater than 2, Phos greater than 4. Heme: Hemoglobin stable monitor and transfuse as needed. DVT prophylaxis, heparin. SCDs. ID: Concern for pneumonia?,  Started on vancomycin cefepime and doxycycline  Endo: POC BG, blood sugar 140 to 180 mg/dL. ISS as needed to achieve goal.  Continue home dose levothyroxine. MSK: OOBTC PT/OT when able    Tubes/Lines/Drains:  CVC, PIV Heard    Code Status: Cca, ok to intubate if needed           Chief Complaint:    Shortness of breath    History of Present Illness:    53-year-old male with multiple comorbidities who presented with 1 day worsening of shortness of breath admitted to the hospital for acute on chronic CHF exacerbation. Yesterday ICU was consulted for evaluation, patient was evaluated at bedside and at the time was noted to be breathing comfortably on 6 L nasal cannula, talking full sentences, alert, oriented though confused (though he does not have the ability to make his own medical decision),  He was stating that he was completely fine refusing to be evaluated and stating that he did not want anything done at the time. After discussion with the hospitalist and given his evaluation the time decision was made to continue to monitor on the floor as is and if his condition worsens then we will to reconsult ICU for further evaluation. Today patient was undergoing CVC placement when he inadvertently had an iatrogenic complication of a pneumothorax requiring chest tube placement. Subsequently patient had a large increase in oxygen requirement became more hypoxic tachypneic requiring high flow nasal cannula via Vapotherm 40 L, 100%. Patient was then transferred to the ICU for further management. Upon arrival he was started on vasoactive agents for his chronic hypotension in the 44E systolics, dobutamine was titrated down to 5 mcg/kg/min and Lasix was increased to 10 mg/h for augmented diuresis. After chest tube placement, patient status slightly improved and he continued to diurese appropriately. ROS: As noted above. Review of Systems     Past Medical, Surgical, Social, Family History:   Past Medical History:   Diagnosis Date    A-fib ([facility] [facility]     on Coumadin    Abdominal pain 3/22/2012    Acute on chronic renal failure (HCC)     Allergic rhinitis     Bipolar affective disorder unspecified     possible schziophrenia per pt- Seeing Dr. Catherine Rogel of left shoulder 2007    s/p injection     CAD (coronary artery disease)     see Leonidas    Cardiomyopathy ([facility] [address]     NYHA FC II-III    CHF (NYHA class III, ACC[facility] stage [facility] 4/27/2012    Cholelithiasis 5/8/2012    per CT    Chronic kidney disease (CKD)     Chronic pain     on Ultram- dispensed at [facility] per CSP/psych program    Depression     Displacement of electrode lead of cardiac pacemaker 10/4/2019    10/4/2019 RV lead got pulled back in the connector    DJD (degenerative joint disease) of cervical spine     Elevated serum creatinine     Erectile dysfunction     Fractures     Left collar bone x8, left wrist, bilateral toes    GERD (gastroesophageal reflux disease)     GERD (gastroesophageal reflux disease)     H/O echocardiogram 03/26/15    EF 20% Mod dilated LV with severly reduced systolic function. pacer wire noted in right ventricle and right atrium.      Hiatal hernia 5/8/2012    per CT    History of nuclear stress test 05/01/2017    lexiscan-large anteroapical MI, no new changes, EF16%    Hypertension Hypothyroid     Started by Dr. Rosanne Wells     Ischemic heart disease     Lipidemia 4/27/2012    Lithium toxicity     4/2015    MI, old 4/27/2012    Paronychia 3/12/2012    S/P cardiac cath 2/21/14    Schizoaffective disorder [facility])     old chart also gives hx of paranoid schizo    Unintentional weight loss 3/22/2012    Possible due to bipolar/tabby/ paranoia (sept 2012) with refusing to eat food from the grocery store believing it's being poisoned      Past Surgical History:   Procedure Laterality Date    ANGIOPLASTY      per old chart pt had angioplasty (LAD) with cutting balloon and arthrectomy done 7/2010    CARDIAC CATHETERIZATION      per old chart pt had cardiac cath 12/2010, 2/2011,4/2012, and 2/2014    CARDIAC SURGERY      arteriogram; stent    CHOLECYSTECTOMY      ENDOSCOPY, COLON, DIAGNOSTIC  9/2015    GASTRIC FUNDOPLICATION  20/20/2249    Robotic laparoscopic assisted nissen, cholecystectomy    HIATAL HERNIA REPAIR  10/7/15    PACEMAKER PLACEMENT      per old chart pt had biV ICD inserted 1/2011 and then 5/2014 had left ventricular lead replacement done    SINUS SURGERY      with allergy testing done in the past- Dr. Richard Dukes (([place])    TONSILLECTOMY       Social History     Socioeconomic History    Marital status:      Spouse name: Not on file    Number of children: Not on file    Years of education: Not on file    Highest education level: Not on file   Occupational History    Not on file   Tobacco Use    Smoking status: Every Day     Packs/day: 0.50     Years: 46.00     Pack years: 23.00     Types: Cigarettes    Smokeless tobacco: Never   Vaping Use    Vaping Use: Never used   Substance and Sexual Activity    Alcohol use: No     Alcohol/week: 0.0 standard drinks    Drug use: No    Sexual activity: Not Currently   Other Topics Concern    Not on file   Social History Narrative    Not on file     Social Determinants of Health     Financial Resource Strain: Not on file   Food Insecurity: Not on file Transportation Needs: Not on file   Physical Activity: Not on file   Stress: Not on file   Social Connections: Not on file   Intimate Partner Violence: Not on file   Housing Stability: Not on file     Family History   Problem Relation Age of Onset    Mental Illness Mother         substance abuse    Mental Illness Sister     Mental Illness Sister        Home Medications:  Prior to Admission medications    Medication Sig Start Date End Date Taking? Authorizing Provider   amiodarone (CORDARONE) 200 MG tablet Take 1 tablet by mouth daily 12/16/21   Georgia Aragon MD   traMADol (ULTRAM) 50 MG tablet Take 1 tablet by mouth every 6 hours as needed for Pain Moderate (4-6). 7/1/21   Historical Provider, MD   digoxin (LANOXIN) 125 MCG tablet Take 1 tablet by mouth daily 2 tab every 12 hours x 48 hours then one daily. 9/15/21   Georgia Aragon MD   metoprolol succinate (TOPROL XL) 25 MG extended release tablet Take 1 tablet by mouth daily 9/15/21   Maurilio Mix MD   HYDROcodone-acetaminophen (NORCO) 5-325 MG per tablet 1 tablet 3 times daily as needed.  3/5/20   Historical Provider, MD   ziprasidone (GEODON) 40 MG capsule 1 capsule 2 times daily 2/19/20   Historical Provider, MD   diltiazem (CARDIZEM) 30 MG tablet Take 1 tablet by mouth every 8 hours  Patient taking differently: Take 45 mg by mouth every 8 hours Pt takes 1.5 tablets daily 10/4/19   Aj Youssef MD   warfarin (COUMADIN) 2.5 MG tablet Take 2 mg by mouth Daily with supper     Historical Provider, MD   acetaminophen (TYLENOL) 325 MG tablet Take 650 mg by mouth every 4 hours as needed for Pain    Historical Provider, MD   ezetimibe (ZETIA) 10 MG tablet Take 10 mg by mouth daily    Historical Provider, MD   atorvastatin (LIPITOR) 80 MG tablet Take 1 tablet by mouth daily 4/18/17   Ryan Mahmood MD   nitroGLYCERIN (NITROSTAT) 0.3 MG SL tablet Place 0.3 mg under the tongue every 5 minutes as needed for Chest pain    Historical Provider, MD levothyroxine (SYNTHROID) 75 MCG tablet Take 75 mcg by mouth Daily. Historical Provider, MD   benztropine (COGENTIN) 1 MG tablet Take 1 mg by mouth 2 times daily     Historical Provider, MD   loratadine (CLARITIN) 10 MG tablet Take 1 tablet by mouth daily. 4/23/13   Kamran Morrison MD         Physical Exam:     BP 82/63   Pulse (!) 107   Temp 98 °F (36.7 °C) (Oral)   Resp 23   Ht 6' 4.5\" (1.943 m)   Wt 172 lb 13.5 oz (78.4 kg)   SpO2 98%   BMI 20.76 kg/m²     General: NAD  Eyes: EOMI  ENT: neck supple, JVD  Cardiovascular: Paced, Irregular irregular  Respiratory: Crackles,  Gastrointestinal: Soft, non tender  Genitourinary: no suprapubic tenderness  Musculoskeletal: No edema  Skin: warm, dry, cold extremities. Neuro: Alert. Labs, Imaging, and Studies reviewed:    XR CHEST (2 VW)    Result Date: 2/2/2023  EXAMINATION: TWO XRAY VIEWS OF THE CHEST 2/2/2023 10:38 am COMPARISON: 02/01/2022 HISTORY: ORDERING SYSTEM PROVIDED HISTORY: chf TECHNOLOGIST PROVIDED HISTORY: Reason for exam:->chf Reason for Exam: chf FINDINGS: There is a left-sided transvenous pacer in place and bilateral pulmonary infiltrates persist unchanged when compared to the prior study. Improved aeration in the lingula     Improved aeration in the lingula. Otherwise stable exam     XR ABDOMEN (KUB) (SINGLE AP VIEW)    Result Date: 2/4/2023  EXAMINATION: ONE SUPINE XRAY VIEW(S) OF THE ABDOMEN 2/4/2023 12:06 pm COMPARISON: None. HISTORY: ORDERING SYSTEM PROVIDED HISTORY: Abdominal distention TECHNOLOGIST PROVIDED HISTORY: Reason for exam:->Abdominal distention Reason for Exam: Abdominal distention FINDINGS: Gas seen in dilated small bowel and stomach. No extraluminal gas. Minimal colonic gas evident.      Findings suggest a diffuse ileus     CT CHEST WO CONTRAST    Result Date: 2/4/2023  EXAMINATION: CT OF THE CHEST WITHOUT CONTRAS, 2/4/2023 10:55 am TECHNIQUE: CT of the chest was performed without the administration of intravenous contrast. Multiplanar reformatted images are provided for review. Automated exposure control, iterative reconstruction, and/or weight based adjustment of the mA/kV was utilized to reduce the radiation dose to as low as reasonably achievable. COMPARISON: Chest radiograph 02/03/2022 HISTORY: ORDERING SYSTEM PROVIDED HISTORY:  SOB TECHNOLOGIST PROVIDED HISTORY: REASON FOR exam:  SOB FINDINGS: Evaluation for neoplasm, infection, focal lesions, and trauma is limited without the use of IV contrast. Mediastinum: Cardiomegaly with small pericardial effusion. Dilation of the pulmonary trunk measuring 3.3 cm can be seen with pulmonary artery hypertension. There is atherosclerosis of the aorta and coronary arteries. Streak artifact from left-sided cardiac conduction device and leads limit evaluation. Suspected enlarged paratracheal and subcarinal lymph nodes. Lungs/Pleura: Mild tracheobronchial secretions. Moderate-sized bilateral dependent layering pleural effusions. No pneumothorax. Diffuse interstitial and alveolar opacities are seen bilaterally with air bronchograms, primarily within an upper lobe predominant distribution with relative sparing of the left lower lobe. The right pleural effusion extends into the major fissure laterally. Upper Abdomen: No acute process within the upper abdomen identified. Soft Tissues/Bones:  Diffuse demineralization and multifocal degenerative changes of the osseous structures. Mild diffuse body wall edema. Diffuse ground-glass and interstitial opacities could represent edema and/or multifocal infection, including atypical etiologies. Moderate-sized bilateral pleural effusions. Cardiomegaly with small pericardial effusion. Lymphadenopathy, nonspecific and possibly reactive. XR CHEST PORTABLE    Result Date: 2/5/2023  EXAMINATION: ONE XRAY VIEW OF THE CHEST 2/5/2023 8:55 pm COMPARISON: 8:06 p.m.  HISTORY: ORDERING SYSTEM PROVIDED HISTORY: et tube and ng tube placement TECHNOLOGIST PROVIDED HISTORY: Reason for exam:->et tube and ng tube placement Reason for Exam: et tube and ng tube placement Additional signs and symptoms: et tube and ng tube placement FINDINGS: 2 sequential x-rays of the chest were obtained. The patient has been intubated. The ETT is in satisfactory position. A right-sided chest tube is unchanged in position. There is mild subcutaneous emphysema along the right lateral chest wall. The heart size is stable. Diffuse bilateral pulmonary airspace opacities are again seen. The lateral costophrenic angles have been partially excluded from view. There is a pulse generator over the left anterior chest wall with multiple leads projecting over the heart. On the initial x-ray, the NG tube terminates in the left lower lobe bronchus. This was readjusted and on the 2nd film, the NG tube traverses the diaphragm although the tip is outside the field of view. ET tube in satisfactory position. NG tube traverses the diaphragm on the 2nd film. XR CHEST PORTABLE    Result Date: 2/5/2023  EXAMINATION: ONE XRAY VIEW OF THE CHEST 2/5/2023 8:03 pm COMPARISON: Earlier today HISTORY: ORDERING SYSTEM PROVIDED HISTORY: pnemothorax TECHNOLOGIST PROVIDED HISTORY: Reason for exam:->pnemothorax Reason for Exam: pneumothorax Additional signs and symptoms: follow up to pneumothorax, still having chest pain FINDINGS: Stable small right apical pneumothorax with right chest tube in unchanged position. Diffuse bilateral pulmonary ground-glass opacities. No left pneumothorax. No pleural effusion. Cardiac and mediastinal contours stable. AICD unchanged. Right subclavian central venous catheter tip remains in the SVC. Right chest wall emphysema redemonstrated. No new osseous abnormality. RECOMMENDATION: 1. Stable small right apical pneumothorax with unchanged position of a right chest tube. 2. Stable diffuse bilateral pulmonary opacities.      XR CHEST PORTABLE    Result Date: 2/5/2023  EXAMINATION: ONE XRAY VIEW OF THE CHEST 2/5/2023 5:30 pm COMPARISON: 02/05/2023 at 13:12 HISTORY: [facility]: Hypoxia TECHNOLOGIST PROVIDED HISTORY: Reason for exam:->Hypoxia Reason for Exam: hypoxia FINDINGS: Transvenous pacer remains in place. Right chest tube remains in place. Small right apical pneumothorax. Heart size stable. Bilateral pulmonary opacities unchanged. Increased soft tissue gas along the right chest wall. Increased soft tissue gas along the right chest wall, interval development of a small right apical and lateral pneumothorax The findings were sent to the Radiology Results [address] at 5:55 pm on 2/5/2023 to be communicated to a licensed caregiver. XR CHEST PORTABLE    Result Date: 2/5/2023  EXAMINATION: ONE XRAY VIEW OF THE CHEST 2/5/2023 5:15 am COMPARISON: 02/03/2023, 02/02/2023 HISTORY: ORDERING SYSTEM PROVIDED HISTORY: heart failure, pneumonia, comparison to prior TECHNOLOGIST PROVIDED HISTORY: Reason for exam:->heart failure, pneumonia, comparison to prior Reason for Exam: heart failure, pneumonia, comparison to prior Additional signs and symptoms: sob FINDINGS: Frontal views of the chest were performed. There is no acute skeletal abnormality. There is a stable left subclavian pacemaker/AICD in place. There is stable gaseous distention of bowel loops beneath the hemidiaphragms. The heart size is mildly enlarged, though stable. The mediastinal contours are stable. There is a curvilinear interface overlying the right upper lung zone, most likely a skin fold as there appear to be lung markings beyond the interface. There has been slight interval improvement in appearance of diffuse multifocal airspace opacity throughout both lungs, likely slightly improving ARDS. There is no evidence of a pneumothorax. 1. Slight interval improvement in appearance of ARDS. 2. Stable cardiomegaly.      XR CHEST PORTABLE    Result Date: 2/5/2023  EXAMINATION: ONE XRAY VIEW OF THE CHEST 2/5/2023 1:11 pm COMPARISON: Chest 02/05/2023 at 12:27 p.m. HISTORY: Right pneumothorax, right pleural catheter placement FINDINGS: *Trace residual right pneumothorax following right pleural catheter placement, catheter tip at the right thoracic cupola. *Diffuse patchy alveolar and interstitial densities bilateral lungs typical of ARDS, diffuse infection or pulmonary edema. No appreciable change. *Cardiomegaly. *Stable left-sided AICD. * No acute superficial soft tissue or osseous structure abnormality evident. RIGHT IMPRESSION: 1. Trace residual right pneumothorax following right pleural catheter placement, catheter tip at the right thoracic cupola. 2. Diffuse patchy alveolar and interstitial densities bilateral lungs typical of ARDS, diffuse infection or pulmonary edema. No appreciable change. 3. Cardiomegaly. 4. Stable left-sided AICD. XR CHEST PORTABLE    Result Date: 2/5/2023  EXAMINATION: ONE XRAY VIEW OF THE CHEST 2/5/2023 12:42 pm COMPARISON: Chest 02/05/2023 time stamp 4:33 a.m. HISTORY: ORDERING SYSTEM PROVIDED HISTORY: CVC placement FINDINGS: *Right pneumothorax, apical pleural separation 22 mm accounting for 15-25% right hemithorax volume. *Right subclavian CVC tip overlies the distal superior vena cava level. *Similar appearing diffuse opacity throughout the bilateral lungs typical of ARDS, diffuse infection or pulmonary edema. *Probable bilateral pleural effusion. *Cardiomegaly. *Stable sequela of AICD, power pack overlies the left hemithorax. 1. 15-25% Right pneumothorax (apical pleural separation 22 mm). 2. Right subclavian CVC tip overlies the distal superior vena cava level. 3. Similar appearing diffuse opacity throughout the bilateral lungs typical of ARDS, diffuse infection or pulmonary edema. 4. Probable bilateral pleural effusion. 5. Cardiomegaly. 6. Stable sequela of AICD, power pack overlies the left hemithorax.  The results were called by Dr. Lulu Bird to Dr. Nick Cervantes on 2/5/2023 at 14:02. XR CHEST PORTABLE    Result Date: 2/3/2023  EXAMINATION: ONE XRAY VIEW OF THE CHEST 2/3/2023 3:32 pm COMPARISON: Chest 02/02/2023 HISTORY: ORDERING SYSTEM PROVIDED HISTORY: SOB, CHF Additional signs and symptoms: na Relevant Medical/Surgical History: na FINDINGS: *Interval progression of diffuse interstitial and alveolar opacities bilateral lungs presumably reflecting CHF though ARDS or diffuse infection are considerations as well. *Cardiomegaly. *No definite pleural effusion evident. *No pneumothorax is seen. *Stable left-sided pacemaker/AICD. 1. Interval progression of diffuse interstitial and alveolar opacities bilateral lungs presumably reflecting CHF though ARDS or diffuse infection are considerations as well. 2. Cardiomegaly. 3. No definite pleural effusion evident. 4. No pneumothorax is seen. 5. Stable left-sided pacemaker/AICD. XR CHEST PORTABLE    Result Date: 2/2/2023  EXAMINATION: ONE X-RAY VIEW OF THE CHEST 2/1/2023 12:29 am COMPARISON: May 21, 2022 HISTORY: ORDERING SYSTEM PROVIDED HISTORY:  Hypoxia, shortness of breath; concern for infectious process or volume overload. TECHNOLOGIST PROVIDED HISTORY: Reason for Exam:  Hypoxia, shortness of breath; concern for infectious process or volume overload. FINDINGS: Left chest wall AICD in place. Cardiomediastinal silhouette is enlarged. The lungs show diffuse ground-glass airspace opacities without significant effusions. No pneumothorax. No acute or aggressive osseous lesion. 1. Diffuse ground-glass airspace opacities concerning for infection over edema. 2. Cardiomegaly.        CBC:   Recent Labs     02/04/23  0026   WBC 13.2*   HGB 10.3*        BMP:    Recent Labs     02/04/23  0026 02/04/23  2056 02/05/23  0108    135 137   K 3.6 3.7 3.6    101 101   CO2 26 26 24   BUN 24* 22 23   CREATININE 1.3 1.2 1.4*   GLUCOSE 111* 125* 110*     Hepatic: No results for input(s): AST, ALT, ALB, BILITOT, ALKPHOS in the last 72 hours. Lipids:   Lab Results   Component Value Date/Time    CHOL 123 02/02/2023 05:03 AM    CHOL 152 02/20/2020 12:00 AM    HDL 42 02/02/2023 05:03 AM    TRIG 54 02/02/2023 05:03 AM     Hemoglobin A1C: No results found for: LABA1C  TSH:   Lab Results   Component Value Date/Time    TSH 1.664 03/09/2018 12:00 AM     Troponin:   Lab Results   Component Value Date/Time    TROPONINT 0.070 02/05/2023 02:30 PM    TROPONINT 0.055 02/04/2023 05:04 PM    TROPONINT <0.010 02/01/2023 12:22 AM     Lactic Acid: No results for input(s): LACTA in the last 72 hours. BNP:   Recent Labs     02/04/23  0026 02/05/23  0108   PROBNP 8,271* 8,993*     UA:  Lab Results   Component Value Date/Time    NITRU NEGATIVE 02/01/2023 02:31 AM    COLORU YELLOW 02/01/2023 02:31 AM    WBCUA 2 10/01/2019 05:07 PM    RBCUA NONE SEEN 10/01/2019 05:07 PM    MUCUS RARE 10/28/2016 04:40 PM    TRICHOMONAS NONE SEEN 10/01/2019 05:07 PM    BACTERIA NEGATIVE 10/01/2019 05:07 PM    CLARITYU CLEAR 02/01/2023 02:31 AM    [address] <1.005 02/01/2023 02:31 AM    LEUKOCYTESUR NEGATIVE 02/01/2023 02:31 AM    UROBILINOGEN 0.2 02/01/2023 02:31 AM    BILIRUBINUR NEGATIVE 02/01/2023 02:31 AM    BLOODU NEGATIVE 02/01/2023 02:31 AM    KETUA NEGATIVE 02/01/2023 02:31 AM     Urine Cultures: No results found for: LABURIN  Blood Cultures: No results found for: BC  No results found for: BLOODCULT2  Organism: No results found for: [facility]      Critical care attestation:    I spent 65 cumulative minutes (excluding procedures), in full attention to this critically ill patient. I have personally reviewed the patient's history and interval events in the EMR, along with vitals, labs and radiology imaging. Critical care time was spent personally providing care for this patient, excluding billable procedures, and no overlapping with any other provider.   This includes documenting my assessment and plan of care and developing the care plan to treat the problems below. The high probability of deterioration required my full and direct attention, intervention, and personal management due to do to underlying diagnosis and clinical problems including the treatment of active or impending:  [] Neurological monitoring and treatment  [x] Respiratory failure -assessment of ventilator support, adjustment, ventilator weaning  [x] Hemodynamic and volume assessment with volume resuscitation  [x] Mechanical and/or chemical support of the circulation,  [x] Frequent vasoactive agent adjustment,  [] Renal replacement therapy,  [x] For rapid decompensation,  [] Electrolyte instability  [] Suctioning every 2 hours  [] Every hour neuro checks  [] Every hour neurovascular checks  [x] Frequent evaluation of patient's response to treatment and titration of therapies,  [x] Interpretation of laboratory and radiological data,  [x] Application and interpretation of advanced monitoring technologies,  [x] Extensive interpretation of multiple databases,  [x] Development of treatment plan with patient, surrogate, or consultants.   [] Others:     Electronically signed by Ramon Hernandez MD on 2/5/2023 at 10:27 PM

## 2023-02-06 NOTE — PROGRESS NOTES
[id number] [facility]  consulted by Ramonita Mckinney for monitoring and adjustment. Indication for treatment: Possible pneumonia  Goal trough: Trough Goal: 15-20 mcg/mL  AUC/RANDY: 400-600    Risk Factors for MRSA Identified:   None    Pertinent Laboratory Values:   Temp Readings from Last 3 Encounters:   02/06/23 (!) 102.7 °F (39.3 °C) (Core)   02/14/22 98.2 °F (36.8 °C) (Oral)   11/23/21 98.9 °F (37.2 °C) (Oral)     Recent Labs     02/04/23  0026 02/04/23  1704 02/06/23  0005 02/06/23  0510 02/06/23  1013   WBC 13.2*  --   --  15.0*  --    LACTATE  --    < > 3.2* 3.1* 3.5*    < > = values in this interval not displayed. Recent Labs     02/06/23  0040 02/06/23  0510 02/06/23  1013   BUN 33* 39* 45*   CREATININE 1.9* 2.5* 2.8*       Estimated Creatinine Clearance: 30 mL/min (A) (based on SCr of 2.8 mg/dL (H)). Intake/Output Summary (Last 24 hours) at 2/6/2023 1420  Last data filed at 2/6/2023 1234  Gross per 24 hour   Intake 5216.94 ml   Output 1826 ml   Net 3390.94 ml         Pertinent Cultures:   Date    Source    Results  2/01   Blood ( 1 of 4)   Staph epidermis  2/01   Resp panel PCR  Negative  2/03   MRSA Nasal   Pending  2/03   Blood    NGTD    Vancomycin level:   TROUGH:    Recent Labs     02/04/23  0026   VANCOTROUGH 7.5*       RANDOM:    Recent Labs     02/06/23  0510   VANCORANDOM 52.4       Assessment:  HPI: 74 y.o male with pmh of atrial fibrillation, hypertension, HF and hyperlipidemia who is on admission for acute hypoxic respiratory failure secondary to acute on chronic systolic/diastolic heart failure. Concern for pneumonia with fever and elevated WBC.   SCr, BUN, and urine output:   CKD III, at baseline  Day(s) of therapy: 4 of 7  Vancomycin concentration:   2/04: 7.5, ~ 21 hours post-dose on 1500 mg q24h, AUC: 517 (therapeutic) (1250 q24h)  2/06: 52.4, level drawn while dose infusing/falsely elevated    Plan:  Based on trends, adjust from scheduled to intermittent dosing vancomycin  Repeat level tomorrow AM  Pharmacy will continue to monitor patient and adjust therapy as indicated    Ophelia 3 02/07 @0600    Thank you for the consult,  DILAN Pérez [facility], PharmD  2/6/2023 2:20 PM

## 2023-02-06 NOTE — PROCEDURES
Critical Care Intubation Note   Pre-Intubation     Reason for consultation: Acute respiratory failure     Interventions prior to Anesthesiologist's arrival: IV access, AmbuBag     Procedure: Endotracheal intubation   [X] Airway equipment was checked. [X] Suction available.   [X] Monitors including pulse oximetry, NIBP, and ECG monitoring in place or applied. [X] The patient was preoxygenated. Ventilation   Ventilation: Easy   Cricoid Pressure: Yes   Rapid sequence induction: Yes   Cervical collar present: No   In line stabilization or cervical collar left in place: No     Induction   Induction was performed with 20 mg of Etomidate, and 50 mg of Rocuronium for paralysis. Induction was smooth with minimal changes in vital signs     Endotracheal Intubation   Intubation: was successful on 1 attempt. Route: Oral   Blade:  Mac 3   Adjuncts used: None   View of cords: Grade 1 view   ETT Size: 8.0 cuffed   Depth: 24 cm at the teeth   Confirmation: Colormetric change on ETCO2 detector x6, quantitative ETCO2   Secured with: [facility]   Complications: None   Intubation performed by: Magui Dacosta MD, Critical Care Attending

## 2023-02-06 NOTE — PROGRESS NOTES
Spoke with Liana Doll over phone to confirm [facility] change and plan for withdraw of care. Also confirmed via phone with Shaka Hermosillo at this time. Dr. Rosalia Zhang notified regarding this conversation.

## 2023-02-06 NOTE — PROGRESS NOTES
PHARMACY ANTICOAGULATION MONITORING SERVICE    Jag Carlos is a 74 y.o. male on warfarin therapy for PAF. Pharmacy consulted by Dr. iMesha Scruggs for monitoring and adjustment of treatment. Indication for anticoagulation: PAF  INR goal: 2 - 3  Warfarin dose prior to admission: 1.5 mg po daily    Pertinent Laboratory Values   Recent Labs     02/04/23  0026 02/05/23  0108 02/06/23  0510   INR 3.96   < > 4.04   HGB 10.3*  --  9.9*   HCT 30.8*  --  30.4*     --  227    < > = values in this interval not displayed. Assessment/Plan:  Possible Drug Interactions:   Amiodarone stopped  Aspirin (Home med)  Levothyroxine (Home med)  Acetaminophen (PRN)  Dexamethasone may increase effectiveness of warfarin     INR supra-therapeutic on admission at 4.29  Warfarin has been on hold since 2/01  INR remains elevated, initially trending down and now with increase back up to 4.04    Continue to hold warfarin doses at this time  Pharmacy will continue to trend INR and resume warfarin when level is back within therapeutic range (closer to 3 or below)  Additional dose adjustments to be made per INR trends, interacting medications, and other clinical factors.     Thank you for the consult,  [facility], PharmD  2/6/2023 3:59 PM

## 2023-02-06 NOTE — PROGRESS NOTES
V2.0  [facility] [place] [facility] Critical Care Progress Note      Name:  Aviva Cabrera /Age/Sex: 1955  (74 y.o. male)   MRN & CSN:  [id number] & [id number] Encounter Date/Time: 2023 5:43 PM EST    Location:  -A PCP: Favian Dixon MD       Hospital Day: 6    Assessment and Plan:   Aviva Cabrera is a 74 y.o. male with a history of A. fib CKD, GERD, hypotension, hyperlipidemia, bipolar disorder, cardiomyopathy, CHF NYHA class III/IV, ischemic cardiomyopathy who presented to [facility] 2023 with shortness of breath, acute worsening of his hypoxia and increased oxygen requirement after noted procedure on the floor leading to pneumothorax requiring chest tube. Patient transferred to the ICU for further management of his medical condition. Problem list  Cardiogenic shock  Acute on Chronic heart failure exacerbation  Hypoxic respiratory failure  Pneumothorax  ARDS, severe PF < 200  IRVIN  Cardiorenal syndrome       Neuro: off sedation, not awake, not following commands. No gag/cough. Sluggish pupils. Breathing over the vent    Cardio: Cardiogenic shock from acute decompensated chronic heart failure. Patient lives with blood pressures in 90s over 50s at baseline given his extensive cardiomyopathy. Diuresing with bumex gtt. increased pressors requirement , on levo, vaso and epi. Titrate vasoactive agents to SBP> 90 mmHg. Will DC dobutamine - discussed with Cardiology  Tachyarrhythmias noted while he was on the floor, amiodarone was considered though discontinued given LFT abnormalities. On lidocaine gtt. Will cotninue for now    Resp: Acute respiratory failure, likely from worsening cardiogenic shock with increased pulmonary edema. intubated . Pneumothorax, iatrogenic status post CVC placement. Chest tube to -20. Continue management. Serial chest x-ray for monitoring. GI: N.p.o. at this time, advance diet as tolerated fluid restriction. GI prophylaxis.   Deranged LFTs likely from hepatic congestion from acute decompensation of heart failure. Monitor. : Acute kidney injury, cardiorenal syndrome, augment with diuresis and  vasoactive agents support. Monitor electrolytes closely and replenish as needed. Keep potassium greater than 4, magnesium greater than 2, Phos greater than 4. Heme: Hemoglobin stable monitor and transfuse as needed. DVT prophylaxis, heparin. SCDs. ID: Concern for pneumonia?,  Started on vancomycin cefepime and doxycycline. Will switch to meropenem. Repeat cultures. Endo: POC BG, blood sugar 140 to 180 mg/dL. ISS as needed to achieve goal.  Continue home dose levothyroxine. Tubes/Lines/Drains:  CVC, [place], [place], PIV [place]     Code Status: Cca, left VM for legal guardian for call back       Diet ADULT ORAL NUTRITION SUPPLEMENT; Breakfast, Dinner; Standard High Calorie/High Protein Oral Supplement  ADULT DIET; Easy to Chew; Low Fat/Low Chol/High Fiber/2 gm Na; Low Sodium (2 gm); 1500 ml   DVT Prophylaxis [] Lovenox, []  Heparin, [] SCDs, [] Ambulation,  [] Eliquis, [] Xarelto  [] Coumadin   GI Prophylaxis [] Yes          [] No   Code Status [id number]    Disposition Patient requires continued ICU care due to shock, vent dependance     Subjective:   Remains on 3 pressors. Propofol turned off in AM, no purposeful response. Worsening renal function, worsening lactic acidosis. Unable to give more fluids given cardiac status. Review of Systems:    Review of Systems    Uable to assess    Objective: Intake/Output Summary (Last 24 hours) at 2/6/2023 1743  Last data filed at 2/6/2023 1632  Gross per 24 hour   Intake 5216.94 ml   Output 1358 ml   Net 3858.94 ml        Vitals:   Vitals:    02/06/23 1700   BP:    Pulse: 100   Resp: (!) 34   Temp:    SpO2: 95%       Physical Exam:     General: sedated   Eyes: pupils sluggish  ENT: neck supple  Cardiovascular: irRegular rate.   Respiratory: crackles  Gastrointestinal: Soft,  Genitourinary: no suprapubic tenderness  Musculoskeletal: No edema  Skin: warm, dry  Neuro: no cough, gag, sluggish pupils.    Psych: unable to assess    Medications:   Medications:    albuterol  2.5 mg Nebulization Q6H    vancomycin (VANCOCIN) intermittent dosing (placeholder)   Other RX Placeholder    meropenem  1,000 mg IntraVENous Q12H    sodium chloride (Inhalant)  4 mL Nebulization Q6H    dexamethasone  4 mg IntraVENous Q6H    [Held by provider] sacubitril-valsartan  1 tablet Oral BID    sodium chloride flush  5-40 mL IntraVENous 2 times per day    atorvastatin  80 mg Oral Daily    levothyroxine  75 mcg Oral Daily    aspirin  81 mg Oral Daily    warfarin placeholder: dosing by pharmacy   Other RX Placeholder      Infusions:    dexmedetomidine HCl in NaCl Stopped (02/06/23 1353)    propofol Stopped (02/06/23 1236)    lidocaine 1 mg/min (02/06/23 0750)    sodium chloride Stopped (02/06/23 0223)    bumetanide 0.1 mg/mL infusion 2 mg/hr (02/06/23 1353)    EPINEPHrine 10 mcg/min (02/06/23 0743)    norepinephrine 40 mcg/min (02/06/23 1144)    vasopressin 0.04 Units/min (02/06/23 1536)    sodium chloride       PRN Meds: midazolam, 2 mg, Q1H PRN  fentanNYL, 25 mcg, Q2H PRN  morphine, 1 mg, Q4H PRN  Benzocaine-Menthol, 1 lozenge, Q2H PRN  albuterol, 2.5 mg, Q2H PRN  sodium chloride flush, 5-40 mL, PRN  sodium chloride, , PRN  polyethylene glycol, 17 g, Daily PRN  acetaminophen, 650 mg, Q6H PRN   Or  acetaminophen, 650 mg, Q6H PRN  potassium chloride, 40 mEq, PRN   Or  potassium alternative oral replacement, 40 mEq, PRN   Or  potassium chloride, 10 mEq, PRN  magnesium sulfate, 2,000 mg, PRN  calcium carbonate, 500 mg, TID PRN        Labs      Recent Results (from the past 24 hour(s))   Blood gas, arterial    Collection Time: 02/05/23  9:15 PM   Result Value Ref Range    pH, Bld 7.18 (L) 7.34 - 7.45    pCO2, Arterial 62.0 (H) 32 - 45 MMHG    pO2, Arterial 196 (H) 75 - 100 MMHG    Base Excess 6 (H) 0 - 3.3    HCO3, Arterial 23.1 (H) 18 - 23 MMOL/L CO2 Content 25.0 (H) 19 - 24 MMOL/L    O2 Sat 96.8 96 - 97 %    Carbon Monoxide, Blood 1.4 0 - 5 %    Methemoglobin, Arterial 1.2 <1.5 %    Comment AC/VC, 18, 500, +10, FIO2 100%    Blood gas, arterial    Collection Time: 02/05/23 10:45 PM   Result Value Ref Range    pH, Bld 7.31 (L) 7.34 - 7.45    pCO2, Arterial 45.0 32 - 45 MMHG    pO2, Arterial 205 (H) 75 - 100 MMHG    Base Excess 4 (H) 0 - 3.3    HCO3, Arterial 22.7 18 - 23 MMOL/L    CO2 Content 24.1 (H) 19 - 24 MMOL/L    O2 Sat 96.2 96 - 97 %    Carbon Monoxide, Blood 1.2 0 - 5 %    Methemoglobin, Arterial 1.7 (H) <1.5 %    Comment AC/VC. 22, 500, FIO2 100%, +10    Blood Gas, Venous    Collection Time: 02/06/23 12:00 AM   Result Value Ref Range    pH, Gilberto 7.24 (L) 7.32 - 7.42    pCO2, Gilberto 57 (H) 38 - 52 mmHG    pO2, Gilberto 51 (H) 28 - 48 mmHG    Base Excess 4 (H) 0 - 3.3    HCO3, Venous 24.4 19 - 25 MMOL/L    O2 Sat, Gilberto 80.9 (H) 50 - 70 %    Comment AC/VC 22, 500, FIO2 100%, +10    Lactic Acid    Collection Time: 02/06/23 12:05 AM   Result Value Ref Range    Lactate 3.2 (HH) 0.5 - 1.9 mMOL/L   Critical Care Panel    Collection Time: 02/06/23 12:40 AM   Result Value Ref Range    Sodium 133 (L) 135 - 145 MMOL/L    Potassium 3.5 3.5 - 5.1 MMOL/L    Chloride 95 (L) 99 - 110 mMol/L    CO2 21 21 - 32 MMOL/L    Anion Gap 17 (H) 4 - 16    BUN 33 (H) 6 - 23 MG/DL    Creatinine 1.9 (H) 0.9 - 1.3 MG/DL    Est, Glom Filt Rate 39 (L) >60 mL/min/1.73m2    Glucose 247 (H) 70 - 99 MG/DL    Calcium 8.1 (L) 8.3 - 10.6 MG/DL    Phosphorus 6.0 (H) 2.5 - 4.9 MG/DL    Magnesium 2.8 (H) 1.8 - 2.4 mg/dl   Hepatic Function Panel    Collection Time: 02/06/23 12:40 AM   Result Value Ref Range    Albumin 3.3 (L) 3.4 - 5.0 GM/DL    Total Bilirubin 0.7 0.0 - 1.0 MG/DL    Bilirubin, Direct 0.4 (H) 0.0 - 0.3 MG/DL    Bilirubin, Indirect 0.3 0 - 0.7 MG/DL    Alkaline Phosphatase 96 40 - 129 IU/L    AST 72 (H) 15 - 37 IU/L    ALT 53 (H) 10 - 40 U/L    Total Protein 5.7 (L) 6.4 - 8.2 GM/DL   Blood gas, arterial    Collection Time: 02/06/23  4:45 AM   Result Value Ref Range    pH, Bld 7.29 (L) 7.34 - 7.45    pCO2, Arterial 42.0 32 - 45 MMHG    pO2, Arterial 161 (H) 75 - 100 MMHG    Base Excess 6 (H) 0 - 3.3    HCO3, Arterial 20.2 18 - 23 MMOL/L    CO2 Content 21.5 19 - 24 MMOL/L    O2 Sat 97.0 96 - 97 %    Carbon Monoxide, Blood 1.1 0 - 5 %    Methemoglobin, Arterial 1.1 <1.5 %    Comment AC/VC, 22, 500, 70%, +10    Procalcitonin    Collection Time: 02/06/23  5:10 AM   Result Value Ref Range    Procalcitonin 3.23    Vancomycin Level, Random    Collection Time: 02/06/23  5:10 AM   Result Value Ref Range    Vancomycin Rm 52.4 UG/ML    DOSE AMOUNT DOSE AMT.  GIVEN - 1500 mg     DOSE TIME DOSE TIME GIVEN - 0300    CBC with Auto Differential    Collection Time: 02/06/23  5:10 AM   Result Value Ref Range    WBC 15.0 (H) 4.0 - 10.5 K/CU MM    RBC 3.22 (L) 4.6 - 6.2 M/CU MM    Hemoglobin 9.9 (L) 13.5 - 18.0 GM/DL    Hematocrit 30.4 (L) 42 - 52 %    MCV 94.4 78 - 100 FL    MCH 30.7 27 - 31 PG    MCHC 32.6 32.0 - 36.0 %    RDW 14.9 11.7 - 14.9 %    Platelets 954 287 - 506 K/CU MM    MPV 11.1 7.5 - 11.1 FL    Differential Type AUTOMATED DIFFERENTIAL     Segs Relative 89.6 (H) 36 - 66 %    Lymphocytes % 1.5 (L) 24 - 44 %    Monocytes % 8.0 (H) 0 - 4 %    Eosinophils % 0.0 0 - 3 %    Basophils % 0.1 0 - 1 %    Segs Absolute 13.4 K/CU MM    Lymphocytes Absolute 0.2 K/CU MM    Monocytes Absolute 1.2 K/CU MM    Eosinophils Absolute 0.0 K/CU MM    Basophils Absolute 0.0 K/CU MM    Nucleated RBC % 0.0 %    Total Nucleated RBC 0.0 K/CU MM    Total Immature Neutrophil 0.12 K/CU MM    Immature Neutrophil % 0.8 (H) 0 - 0.43 %   Critical Care Panel    Collection Time: 02/06/23  5:10 AM   Result Value Ref Range    Sodium 134 (L) 135 - 145 MMOL/L    Potassium 4.0 3.5 - 5.1 MMOL/L    Chloride 94 (L) 99 - 110 mMol/L    CO2 19 (L) 21 - 32 MMOL/L    Anion Gap 21 (H) 4 - 16    BUN 39 (H) 6 - 23 MG/DL    Creatinine 2.5 (H) 0.9 - 1.3 MG/DL Est, Glom Filt Rate 28 (L) >60 mL/min/1.73m2    Glucose 200 (H) 70 - 99 MG/DL    Calcium 8.1 (L) 8.3 - 10.6 MG/DL    Phosphorus 7.3 (H) 2.5 - 4.9 MG/DL    Magnesium 2.8 (H) 1.8 - 2.4 mg/dl   Lactic Acid    Collection Time: 02/06/23  5:10 AM   Result Value Ref Range    Lactate 3.1 (HH) 0.5 - 1.9 mMOL/L   Protime/INR & PTT    Collection Time: 02/06/23  5:10 AM   Result Value Ref Range    Protime 52.9 (H) 11.7 - 14.5 SECONDS    INR 4.04 INDEX    aPTT 58.1 (H) 25.1 - 37.1 SECONDS   Hepatic Function Panel    Collection Time: 02/06/23  5:10 AM   Result Value Ref Range    Albumin 3.3 (L) 3.4 - 5.0 GM/DL    Total Bilirubin 1.0 0.0 - 1.0 MG/DL    Bilirubin, Direct 0.6 (H) 0.0 - 0.3 MG/DL    Bilirubin, Indirect 0.4 0 - 0.7 MG/DL    Alkaline Phosphatase 93 40 - 129 IU/L     (H) 15 - 37 IU/L     (H) 10 - 40 U/L    Total Protein 5.6 (L) 6.4 - 8.2 GM/DL   Blood Gas, Venous    Collection Time: 02/06/23  6:00 AM   Result Value Ref Range    pH, Gilberto 7.27 (L) 7.32 - 7.42    pCO2, Gilberto 45 38 - 52 mmHG    pO2, Gilberto 143 (H) 28 - 48 mmHG    Base Excess 6 (H) 0 - 3.3    HCO3, Venous 20.7 19 - 25 MMOL/L    O2 Sat, Gilberto 94.3 (H) 50 - 70 %    Comment AC/VC, 22, 500, 70%, +10    Critical Care Panel    Collection Time: 02/06/23 10:13 AM   Result Value Ref Range    Sodium 134 (L) 135 - 145 MMOL/L    Potassium 4.6 3.5 - 5.1 MMOL/L    Chloride 94 (L) 99 - 110 mMol/L    CO2 19 (L) 21 - 32 MMOL/L    Anion Gap 21 (H) 4 - 16    BUN 45 (H) 6 - 23 MG/DL    Creatinine 2.8 (H) 0.9 - 1.3 MG/DL    Est, Glom Filt Rate 24 (L) >60 mL/min/1.73m2    Glucose 186 (H) 70 - 99 MG/DL    Calcium 8.0 (L) 8.3 - 10.6 MG/DL    Phosphorus 7.8 (H) 2.5 - 4.9 MG/DL    Magnesium 2.8 (H) 1.8 - 2.4 mg/dl   Lactic Acid    Collection Time: 02/06/23 10:13 AM   Result Value Ref Range    Lactate 3.5 (HH) 0.5 - 1.9 mMOL/L   Hepatic Function Panel    Collection Time: 02/06/23 10:13 AM   Result Value Ref Range    Albumin 3.3 (L) 3.4 - 5.0 GM/DL    Total Bilirubin 1.2 (H) 0.0 - 1.0 MG/DL    Bilirubin, Direct 0.7 (H) 0.0 - 0.3 MG/DL    Bilirubin, Indirect 0.5 0 - 0.7 MG/DL    Alkaline Phosphatase 96 40 - 129 IU/L    AST 1,979 (H) 15 - 37 IU/L    ALT 1,027 (H) 10 - 40 U/L    Total Protein 5.4 (L) 6.4 - 8.2 GM/DL   Blood gas, arterial    Collection Time: 02/06/23 10:45 AM   Result Value Ref Range    pH, Bld 7.25 (L) 7.34 - 7.45    pCO2, Arterial 46.0 (H) 32 - 45 MMHG    pO2, Arterial 103 (H) 75 - 100 MMHG    Base Excess 7 (H) 0 - 3.3    HCO3, Arterial 20.2 18 - 23 MMOL/L    CO2 Content 21.6 19 - 24 MMOL/L    O2 Sat 96.0 96 - 97 %    Carbon Monoxide, Blood 1.2 0 - 5 %    Methemoglobin, Arterial 1.1 <1.5 %    Comment VC 22 500 +10 60%    Blood Gas, Venous    Collection Time: 02/06/23 12:00 PM   Result Value Ref Range    pH, Gilberto 7.21 (L) 7.32 - 7.42    pCO2, Gilberto 49 38 - 52 mmHG    pO2, Gilberto 63 (H) 28 - 48 mmHG    Base Excess 8 (H) 0 - 3.3    HCO3, Venous 19.6 19 - 25 MMOL/L    O2 Sat, Gilberto 89.7 (H) 50 - 70 %    Comment VBG    Critical Care Panel    Collection Time: 02/06/23  3:49 PM   Result Value Ref Range    Sodium 136 135 - 145 MMOL/L    Potassium 4.6 3.5 - 5.1 MMOL/L    Chloride 98 (L) 99 - 110 mMol/L    CO2 19 (L) 21 - 32 MMOL/L    Anion Gap 19 (H) 4 - 16    BUN 55 (H) 6 - 23 MG/DL    Creatinine 3.3 (H) 0.9 - 1.3 MG/DL    Est, Glom Filt Rate 20 (L) >60 mL/min/1.73m2    Glucose 184 (H) 70 - 99 MG/DL    Calcium 7.5 (L) 8.3 - 10.6 MG/DL    Phosphorus 7.9 (H) 2.5 - 4.9 MG/DL    Magnesium 2.8 (H) 1.8 - 2.4 mg/dl   Lactic Acid    Collection Time: 02/06/23  3:49 PM   Result Value Ref Range    Lactate 2.4 (HH) 0.5 - 1.9 mMOL/L   Hepatic Function Panel    Collection Time: 02/06/23  3:49 PM   Result Value Ref Range    Albumin 3.2 (L) 3.4 - 5.0 GM/DL    Total Bilirubin 1.0 0.0 - 1.0 MG/DL    Bilirubin, Direct 0.7 (H) 0.0 - 0.3 MG/DL    Bilirubin, Indirect 0.3 0 - 0.7 MG/DL    Alkaline Phosphatase 99 40 - 129 IU/L     (H) 15 - 37 IU/L    ALT 1,661 (H) 10 - 40 U/L    Total Protein 5.1 (L) 6.4 - 8.2 GM/DL   Blood gas, arterial    Collection Time: 02/06/23  4:45 PM   Result Value Ref Range    pH, Bld 7.20 (L) 7.34 - 7.45    pCO2, Arterial 54.0 (H) 32 - 45 MMHG    pO2, Arterial 89 75 - 100 MMHG    Base Excess 7 (H) 0 - 3.3    HCO3, Arterial 21.1 18 - 23 MMOL/L    CO2 Content 22.8 19 - 24 MMOL/L    O2 Sat 95.2 (L) 96 - 97 %    Carbon Monoxide, Blood 1.6 0 - 5 %    Methemoglobin, Arterial 1.1 <1.5 %    Comment AC 22 430 60% +10         Imaging/Diagnostics Last 24 Hours   XR CHEST PORTABLE    Result Date: 2/6/2023  EXAMINATION: ONE XRAY VIEW OF THE CHEST 2/6/2023 5:34 am COMPARISON: 02/05/2023 HISTORY: ORDERING SYSTEM PROVIDED HISTORY: s/p chest tube placement TECHNOLOGIST PROVIDED HISTORY: Reason for exam:->s/p chest tube placement Reason for Exam: s/p chest tube placement Additional signs and symptoms: sob FINDINGS: Endotracheal tube tip is 4.8 cm above the joyd. Enteric tube tip and side port project in the expected location of the stomach. Similar appearance of the pulmonary arterial catheter. There is a left-sided cardiac device with multiple leads. There is a right-sided chest tube. The heart is enlarged but is similar in size to the prior study. Small left pleural effusion. No definite pneumothorax is seen. Bilateral airspace opacities again noted. Soft tissue emphysema seen along the right chest wall port. No significant interval change compared to the prior chest radiograph. Cardiomegaly. Bilateral airspace opacities. Small left pleural effusion. XR CHEST PORTABLE    Result Date: 2/5/2023  EXAMINATION: ONE XRAY VIEW OF THE CHEST 2/5/2023 10:10 pm COMPARISON: Chest 02/05/2023 at 20:38 HISTORY: ORDERING SYSTEM PROVIDED HISTORY: Cassie Knight placement FINDINGS: *Right IJ [place]-Petros catheter via introducer sheath has its tip at the pulmonary outflow tract level. *No pneumothorax. *Endotracheal tube is in place, tip at the level of the clavicular heads, tip 4.6 cm superior to the jody.  *NG tube extends below the left hemidiaphragm, out of the field of view. *Stable right pleural catheter, tip at the right thoracic cupola. *Cardiomegaly. *Redemonstration of diffuse pulmonary opacities bilateral lungs typical of ARDS, diffuse infection or pulmonary edema. *Overlying the AICD. 1. Right IJ [place]-Petros catheter via introducer sheath has its tip at the pulmonary outflow tract level. 2. No pneumothorax. 3. Endotracheal tube is in place, tip at the level of the clavicular heads, tip 4.6 cm superior to the jody. 4.  NG tube extends below the left hemidiaphragm, out of the field of view. 5. Stable right pleural catheter, tip at the right thoracic cupola. 6. Cardiomegaly. 7. Redemonstration of diffuse pulmonary opacities bilateral lungs typical of ARDS, diffuse infection or pulmonary edema. 8. Overlying the AICD. XR CHEST PORTABLE    Result Date: 2/5/2023  EXAMINATION: ONE XRAY VIEW OF THE CHEST 2/5/2023 8:55 pm COMPARISON: 8:06 p.m. HISTORY: ORDERING SYSTEM PROVIDED HISTORY: et tube and ng tube placement TECHNOLOGIST PROVIDED HISTORY: Reason for exam:->et tube and ng tube placement Reason for Exam: et tube and ng tube placement Additional signs and symptoms: et tube and ng tube placement FINDINGS: 2 sequential x-rays of the chest were obtained. The patient has been intubated. The ETT is in satisfactory position. A right-sided chest tube is unchanged in position. There is mild subcutaneous emphysema along the right lateral chest wall. The heart size is stable. Diffuse bilateral pulmonary airspace opacities are again seen. The lateral costophrenic angles have been partially excluded from view. There is a pulse generator over the left anterior chest wall with multiple leads projecting over the heart. On the initial x-ray, the NG tube terminates in the left lower lobe bronchus. This was readjusted and on the 2nd film, the NG tube traverses the diaphragm although the tip is outside the field of view.      ET tube in satisfactory position. NG tube traverses the diaphragm on the 2nd film. XR CHEST PORTABLE    Result Date: 2/5/2023  EXAMINATION: ONE XRAY VIEW OF THE CHEST 2/5/2023 8:03 pm COMPARISON: Earlier today HISTORY: ORDERING SYSTEM PROVIDED HISTORY: pnemothorax TECHNOLOGIST PROVIDED HISTORY: Reason for exam:->pnemothorax Reason for Exam: pneumothorax Additional signs and symptoms: follow up to pneumothorax, still having chest pain FINDINGS: Stable small right apical pneumothorax with right chest tube in unchanged position. Diffuse bilateral pulmonary ground-glass opacities. No left pneumothorax. No pleural effusion. Cardiac and mediastinal contours stable. AICD unchanged. Right subclavian central venous catheter tip remains in the SVC. Right chest wall emphysema redemonstrated. No new osseous abnormality. RECOMMENDATION: 1. Stable small right apical pneumothorax with unchanged position of a right chest tube. 2. Stable diffuse bilateral pulmonary opacities. XR CHEST PORTABLE    Result Date: 2/5/2023  EXAMINATION: ONE XRAY VIEW OF THE CHEST 2/5/2023 5:30 pm COMPARISON: 02/05/2023 at 13:12 HISTORY: [facility]: Hypoxia TECHNOLOGIST PROVIDED HISTORY: Reason for exam:->Hypoxia Reason for Exam: hypoxia FINDINGS: Transvenous pacer remains in place. Right chest tube remains in place. Small right apical pneumothorax. Heart size stable. Bilateral pulmonary opacities unchanged. Increased soft tissue gas along the right chest wall. Increased soft tissue gas along the right chest wall, interval development of a small right apical and lateral pneumothorax The findings were sent to the Radiology Results [address] at 5:55 pm on 2/5/2023 to be communicated to a licensed caregiver.      XR CHEST PORTABLE    Result Date: 2/5/2023  EXAMINATION: ONE XRAY VIEW OF THE CHEST 2/5/2023 5:15 am COMPARISON: 02/03/2023, 02/02/2023 HISTORY: ORDERING SYSTEM PROVIDED HISTORY: heart failure, pneumonia, comparison to prior TECHNOLOGIST PROVIDED HISTORY: Reason for exam:->heart failure, pneumonia, comparison to prior Reason for Exam: heart failure, pneumonia, comparison to prior Additional signs and symptoms: sob FINDINGS: Frontal views of the chest were performed. There is no acute skeletal abnormality. There is a stable left subclavian pacemaker/AICD in place. There is stable gaseous distention of bowel loops beneath the hemidiaphragms. The heart size is mildly enlarged, though stable. The mediastinal contours are stable. There is a curvilinear interface overlying the right upper lung zone, most likely a skin fold as there appear to be lung markings beyond the interface. There has been slight interval improvement in appearance of diffuse multifocal airspace opacity throughout both lungs, likely slightly improving ARDS. There is no evidence of a pneumothorax. 1. Slight interval improvement in appearance of ARDS. 2. Stable cardiomegaly. XR CHEST PORTABLE    Result Date: 2/5/2023  EXAMINATION: ONE XRAY VIEW OF THE CHEST 2/5/2023 1:11 pm COMPARISON: Chest 02/05/2023 at 12:27 p.m. HISTORY: Right pneumothorax, right pleural catheter placement FINDINGS: *Trace residual right pneumothorax following right pleural catheter placement, catheter tip at the right thoracic cupola. *Diffuse patchy alveolar and interstitial densities bilateral lungs typical of ARDS, diffuse infection or pulmonary edema. No appreciable change. *Cardiomegaly. *Stable left-sided AICD. * No acute superficial soft tissue or osseous structure abnormality evident. RIGHT IMPRESSION: 1. Trace residual right pneumothorax following right pleural catheter placement, catheter tip at the right thoracic cupola. 2. Diffuse patchy alveolar and interstitial densities bilateral lungs typical of ARDS, diffuse infection or pulmonary edema. No appreciable change. 3. Cardiomegaly. 4. Stable left-sided AICD.      XR CHEST PORTABLE    Result Date: 2/5/2023  EXAMINATION: ONE XRAY VIEW OF THE CHEST 2/5/2023 12:42 pm COMPARISON: Chest 02/05/2023 time stamp 4:33 a.m. HISTORY: ORDERING SYSTEM PROVIDED HISTORY: CVC placement FINDINGS: *Right pneumothorax, apical pleural separation 22 mm accounting for 15-25% right hemithorax volume. *Right subclavian CVC tip overlies the distal superior vena cava level. *Similar appearing diffuse opacity throughout the bilateral lungs typical of ARDS, diffuse infection or pulmonary edema. *Probable bilateral pleural effusion. *Cardiomegaly. *Stable sequela of AICD, power pack overlies the left hemithorax. 1. 15-25% Right pneumothorax (apical pleural separation 22 mm). 2. Right subclavian CVC tip overlies the distal superior vena cava level. 3. Similar appearing diffuse opacity throughout the bilateral lungs typical of ARDS, diffuse infection or pulmonary edema. 4. Probable bilateral pleural effusion. 5. Cardiomegaly. 6. Stable sequela of AICD, power pack overlies the left hemithorax. The results were called by Dr. Sarah Harpre to Dr. Valdemar Starsk on 2/5/2023 at 14:02.      Time spent 45 minutes     Electronically signed by Kena Patel MD on 2/6/2023 at 5:43 PM    Time spent 45 mins

## 2023-02-06 NOTE — PROGRESS NOTES
V2.0  [facility] [place] [facility]ist Progress Note      Name:  Robert Oliveira /Age/Sex: 1955  (74 y.o. male)   MRN & CSN:  [id number] & [id number] Encounter Date/Time: 2023 6:54 PM EST    Location:  -A PCP: Marta Delgadillo MD       Hospital Day: 6    Assessment and Plan:   Robert Oliveira is a 74 y.o. male who presents with Heart failure ([address]    Patient does not have capacity to make his own decisions as he does not have insight or understand the implications of his decisions. He has a legal guardian whose information is in the chart. Please reach out to the legal guardian with any concerning matters or decisions. Plan:    Acute hypoxic respiratory failure  Mixed shock  Acute on chronic systolic/diastolic heart failure  Severe ARDS  Iatrogenic pneumothorax s/p chest tube -following CVC placement on   Evidenced by fluid overload on clinical exam and chest x-ray, proBNP 4675. Required BiPAP due to increased work of breathing on arrival, transitioned to HFNC.   -Echocardiogram 2021: Ejection fraction 10%, severely dilated left ventricle, grade 3 diastolic dysfunction, mild to moderate MR. Repeat TTE this visit with LVEF 25%. -CT with diffuse groundglass and interstitial opacities, moderate bilateral pleural effusions  -Chest tube to suction, serial CXR  -Continue diuresis and BP support  -Cardiology following: Recommend weaning off dobutamine drip  -Continue broad-spectrum antibiotics  -Patient has very poor prognosis, recommend goals of care discussion with legal guardian    Suspect pneumonia  Respiratory distress on arrival.  Intermittently hypotensive, high grade fever (101.2) on  PM, leukocytosis to 14, lactate 3.4 (but down trended with diuretics). Procalcitonin up from 0.165-1.30.  UA and urine culture negative. Initial CXR with diffuse groundglass airspace opacities concerning for infection. Respiratory panel negative.   -Repeat CXR with interval progression of diffuse interstitial and alveolar opacities in bilateral lungs, likely CHF however infection difficult to rule out thus will obtain CT chest without contrast to better visualize  -Blood cultures with 1/4 bottles positive for staph epidermidis, likely contaminant; repeat blood cultures negative; TTE negative for vegetations  -Would not administer sepsis fluids at this time as patient has very poor heart function and is in acute heart failure exacerbation at present with active need for fluid removal  -Maintain broad-spectrum antibiotics  -Follow respiratory studies  -Follow inflammatory markers (procalal trending up)  -Steroids, duonebs, morphine     Hypokalemia  -Monitor closely while on diuretics    Cardiorenal syndrome with history of CKD stage III  Baseline creatinine 1.3-1.4  Creatinine appears to be at baseline  -Continue diuresis  -Monitor renal function parameters  -Avoid nephrotoxic agents     Paroxysmal atrial fibrillation  Long-term use of Coumadin  Supratherapeutic INR  INR 4.29  -Cardiology stopped amiodarone as concern for lung toxicity   -Hold Toprol as patient requiring dobutamine  -Pharmacy to dose Coumadin    Coronary artery disease status post LAD PCI in [facility]  -Continue aspirin and statin     Hypothyroidism  -Continue Synthroid home regimen     Hypertension -patient hypotensive  -Hold BB as patient on dobutamine    Hyperlipidemia  -Continue statin    Tobacco abuse  - regarding cessation      Diet ADULT ORAL NUTRITION SUPPLEMENT; Breakfast, Dinner; Standard High Calorie/High Protein Oral Supplement  ADULT DIET; Easy to Chew; Low Fat/Low Chol/High Fiber/2 gm Na;  Low Sodium (2 gm); 1500 ml   DVT Prophylaxis [] Lovenox, []  Heparin, [] SCDs, [] Ambulation,  [] Eliquis, [] Xarelto  [x] Coumadin   Code Status DNR-CCA   Disposition From: Home  Expected Disposition: [facility]  Estimated Date of Discharge: TBD  Patient requires continued admission due to requiring pressor support and mechanical ventilation   Surrogate Decision Maker/ POA      Subjective:     Chief Complaint: Shortness of Breath (Started 3 hrs ago)     Yesterday, general surgery placed CVC which was complicated by pneumothorax s/p chest tube placement with resolution. Patient was transferred to ICU but overnight rhythm changed to V. tach and patient extremely tachypneic and hypoxic requiring intubation. At this time he is on lidocaine, epinephrine, Levophed, vasopressin, dobutamine and Bumex. He is on propofol of 20 but not responding at all, even to pain. Discussed with RN at bedside. Review of Systems:    Review of Systems    Negative except as above. Objective: Intake/Output Summary (Last 24 hours) at 2/6/2023 1550  Last data filed at 2/6/2023 1234  Gross per 24 hour   Intake 5216.94 ml   Output 1826 ml   Net 3390.94 ml          Vitals:   Vitals:    02/06/23 1541   BP:    Pulse:    Resp: 30   Temp:    SpO2:        Physical Exam:     General: NAD  Eyes: EOMI  ENT: neck supple  Cardiovascular: Regular rate. Respiratory: Clear to auscultation  Gastrointestinal: Soft, non tender  Genitourinary: no suprapubic tenderness  Musculoskeletal: No edema  Skin: Extremities are warm, dry  Neuro: Alert. Psych: Mood appropriate.      Medications:   Medications:    albuterol  2.5 mg Nebulization Q6H    vancomycin (VANCOCIN) intermittent dosing (placeholder)   Other RX Placeholder    meropenem  1,000 mg IntraVENous Q12H    sodium chloride (Inhalant)  4 mL Nebulization Q6H    dexamethasone  4 mg IntraVENous Q6H    [Held by provider] sacubitril-valsartan  1 tablet Oral BID    sodium chloride flush  5-40 mL IntraVENous 2 times per day    atorvastatin  80 mg Oral Daily    levothyroxine  75 mcg Oral Daily    aspirin  81 mg Oral Daily    warfarin placeholder: dosing by pharmacy   Other RX Placeholder      Infusions:    dexmedetomidine HCl in NaCl Stopped (02/06/23 1353)    propofol Stopped (02/06/23 1236)    lidocaine 1 mg/min (02/06/23 0750) sodium chloride Stopped (02/06/23 0223)    bumetanide 0.1 mg/mL infusion 2 mg/hr (02/06/23 1353)    EPINEPHrine 10 mcg/min (02/06/23 0743)    norepinephrine 40 mcg/min (02/06/23 1144)    vasopressin 0.04 Units/min (02/06/23 1536)    sodium chloride       PRN Meds: midazolam, 2 mg, Q1H PRN  fentanNYL, 25 mcg, Q2H PRN  morphine, 1 mg, Q4H PRN  Benzocaine-Menthol, 1 lozenge, Q2H PRN  albuterol, 2.5 mg, Q2H PRN  sodium chloride flush, 5-40 mL, PRN  sodium chloride, , PRN  polyethylene glycol, 17 g, Daily PRN  acetaminophen, 650 mg, Q6H PRN   Or  acetaminophen, 650 mg, Q6H PRN  potassium chloride, 40 mEq, PRN   Or  potassium alternative oral replacement, 40 mEq, PRN   Or  potassium chloride, 10 mEq, PRN  magnesium sulfate, 2,000 mg, PRN  calcium carbonate, 500 mg, TID PRN      Labs      Recent Results (from the past 24 hour(s))   Blood gas, arterial    Collection Time: 02/05/23  9:15 PM   Result Value Ref Range    pH, Bld 7.18 (L) 7.34 - 7.45    pCO2, Arterial 62.0 (H) 32 - 45 MMHG    pO2, Arterial 196 (H) 75 - 100 MMHG    Base Excess 6 (H) 0 - 3.3    HCO3, Arterial 23.1 (H) 18 - 23 MMOL/L    CO2 Content 25.0 (H) 19 - 24 MMOL/L    O2 Sat 96.8 96 - 97 %    Carbon Monoxide, Blood 1.4 0 - 5 %    Methemoglobin, Arterial 1.2 <1.5 %    Comment AC/VC, 18, 500, +10, FIO2 100%    Blood gas, arterial    Collection Time: 02/05/23 10:45 PM   Result Value Ref Range    pH, Bld 7.31 (L) 7.34 - 7.45    pCO2, Arterial 45.0 32 - 45 MMHG    pO2, Arterial 205 (H) 75 - 100 MMHG    Base Excess 4 (H) 0 - 3.3    HCO3, Arterial 22.7 18 - 23 MMOL/L    CO2 Content 24.1 (H) 19 - 24 MMOL/L    O2 Sat 96.2 96 - 97 %    Carbon Monoxide, Blood 1.2 0 - 5 %    Methemoglobin, Arterial 1.7 (H) <1.5 %    Comment AC/VC. 22, 500, FIO2 100%, +10    Blood Gas, Venous    Collection Time: 02/06/23 12:00 AM   Result Value Ref Range    pH, Gilberto 7.24 (L) 7.32 - 7.42    pCO2, Gilberto 57 (H) 38 - 52 mmHG    pO2, Gilberto 51 (H) 28 - 48 mmHG    Base Excess 4 (H) 0 - 3.3 HCO3, Venous 24.4 19 - 25 MMOL/L    O2 Sat, Gilberto 80.9 (H) 50 - 70 %    Comment AC/VC 22, 500, FIO2 100%, +10    Lactic Acid    Collection Time: 02/06/23 12:05 AM   Result Value Ref Range    Lactate 3.2 (HH) 0.5 - 1.9 mMOL/L   Critical Care Panel    Collection Time: 02/06/23 12:40 AM   Result Value Ref Range    Sodium 133 (L) 135 - 145 MMOL/L    Potassium 3.5 3.5 - 5.1 MMOL/L    Chloride 95 (L) 99 - 110 mMol/L    CO2 21 21 - 32 MMOL/L    Anion Gap 17 (H) 4 - 16    BUN 33 (H) 6 - 23 MG/DL    Creatinine 1.9 (H) 0.9 - 1.3 MG/DL    Est, Glom Filt Rate 39 (L) >60 mL/min/1.73m2    Glucose 247 (H) 70 - 99 MG/DL    Calcium 8.1 (L) 8.3 - 10.6 MG/DL    Phosphorus 6.0 (H) 2.5 - 4.9 MG/DL    Magnesium 2.8 (H) 1.8 - 2.4 mg/dl   Hepatic Function Panel    Collection Time: 02/06/23 12:40 AM   Result Value Ref Range    Albumin 3.3 (L) 3.4 - 5.0 GM/DL    Total Bilirubin 0.7 0.0 - 1.0 MG/DL    Bilirubin, Direct 0.4 (H) 0.0 - 0.3 MG/DL    Bilirubin, Indirect 0.3 0 - 0.7 MG/DL    Alkaline Phosphatase 96 40 - 129 IU/L    AST 72 (H) 15 - 37 IU/L    ALT 53 (H) 10 - 40 U/L    Total Protein 5.7 (L) 6.4 - 8.2 GM/DL   Blood gas, arterial    Collection Time: 02/06/23  4:45 AM   Result Value Ref Range    pH, Bld 7.29 (L) 7.34 - 7.45    pCO2, Arterial 42.0 32 - 45 MMHG    pO2, Arterial 161 (H) 75 - 100 MMHG    Base Excess 6 (H) 0 - 3.3    HCO3, Arterial 20.2 18 - 23 MMOL/L    CO2 Content 21.5 19 - 24 MMOL/L    O2 Sat 97.0 96 - 97 %    Carbon Monoxide, Blood 1.1 0 - 5 %    Methemoglobin, Arterial 1.1 <1.5 %    Comment AC/VC, 22, 500, 70%, +10    Procalcitonin    Collection Time: 02/06/23  5:10 AM   Result Value Ref Range    Procalcitonin 3.23    Vancomycin Level, Random    Collection Time: 02/06/23  5:10 AM   Result Value Ref Range    Vancomycin Rm 52.4 UG/ML    DOSE AMOUNT DOSE AMT.  GIVEN - 1500 mg     DOSE TIME DOSE TIME GIVEN - 0300    CBC with Auto Differential    Collection Time: 02/06/23  5:10 AM   Result Value Ref Range    WBC 15.0 (H) 4.0 - 10.5 K/CU MM    RBC 3.22 (L) 4.6 - 6.2 M/CU MM    Hemoglobin 9.9 (L) 13.5 - 18.0 GM/DL    Hematocrit 30.4 (L) 42 - 52 %    MCV 94.4 78 - 100 FL    MCH 30.7 27 - 31 PG    MCHC 32.6 32.0 - 36.0 %    RDW 14.9 11.7 - 14.9 %    Platelets 462 259 - 363 K/CU MM    MPV 11.1 7.5 - 11.1 FL    Differential Type AUTOMATED DIFFERENTIAL     Segs Relative 89.6 (H) 36 - 66 %    Lymphocytes % 1.5 (L) 24 - 44 %    Monocytes % 8.0 (H) 0 - 4 %    Eosinophils % 0.0 0 - 3 %    Basophils % 0.1 0 - 1 %    Segs Absolute 13.4 K/CU MM    Lymphocytes Absolute 0.2 K/CU MM    Monocytes Absolute 1.2 K/CU MM    Eosinophils Absolute 0.0 K/CU MM    Basophils Absolute 0.0 K/CU MM    Nucleated RBC % 0.0 %    Total Nucleated RBC 0.0 K/CU MM    Total Immature Neutrophil 0.12 K/CU MM    Immature Neutrophil % 0.8 (H) 0 - 0.43 %   Critical Care Panel    Collection Time: 02/06/23  5:10 AM   Result Value Ref Range    Sodium 134 (L) 135 - 145 MMOL/L    Potassium 4.0 3.5 - 5.1 MMOL/L    Chloride 94 (L) 99 - 110 mMol/L    CO2 19 (L) 21 - 32 MMOL/L    Anion Gap 21 (H) 4 - 16    BUN 39 (H) 6 - 23 MG/DL    Creatinine 2.5 (H) 0.9 - 1.3 MG/DL    Est, Glom Filt Rate 28 (L) >60 mL/min/1.73m2    Glucose 200 (H) 70 - 99 MG/DL    Calcium 8.1 (L) 8.3 - 10.6 MG/DL    Phosphorus 7.3 (H) 2.5 - 4.9 MG/DL    Magnesium 2.8 (H) 1.8 - 2.4 mg/dl   Lactic Acid    Collection Time: 02/06/23  5:10 AM   Result Value Ref Range    Lactate 3.1 (HH) 0.5 - 1.9 mMOL/L   Protime/INR & PTT    Collection Time: 02/06/23  5:10 AM   Result Value Ref Range    Protime 52.9 (H) 11.7 - 14.5 SECONDS    INR 4.04 INDEX    aPTT 58.1 (H) 25.1 - 37.1 SECONDS   Hepatic Function Panel    Collection Time: 02/06/23  5:10 AM   Result Value Ref Range    Albumin 3.3 (L) 3.4 - 5.0 GM/DL    Total Bilirubin 1.0 0.0 - 1.0 MG/DL    Bilirubin, Direct 0.6 (H) 0.0 - 0.3 MG/DL    Bilirubin, Indirect 0.4 0 - 0.7 MG/DL    Alkaline Phosphatase 93 40 - 129 IU/L     (H) 15 - 37 IU/L     (H) 10 - 40 U/L    Total Protein 5.6 (L) 6.4 - 8.2 GM/DL   Blood Gas, Venous    Collection Time: 02/06/23  6:00 AM   Result Value Ref Range    pH, Gilberto 7.27 (L) 7.32 - 7.42    pCO2, Gilberto 45 38 - 52 mmHG    pO2, Gilberto 143 (H) 28 - 48 mmHG    Base Excess 6 (H) 0 - 3.3    HCO3, Venous 20.7 19 - 25 MMOL/L    O2 Sat, Gilberto 94.3 (H) 50 - 70 %    Comment AC/VC, 22, 500, 70%, +10    Critical Care Panel    Collection Time: 02/06/23 10:13 AM   Result Value Ref Range    Sodium 134 (L) 135 - 145 MMOL/L    Potassium 4.6 3.5 - 5.1 MMOL/L    Chloride 94 (L) 99 - 110 mMol/L    CO2 19 (L) 21 - 32 MMOL/L    Anion Gap 21 (H) 4 - 16    BUN 45 (H) 6 - 23 MG/DL    Creatinine 2.8 (H) 0.9 - 1.3 MG/DL    Est, Glom Filt Rate 24 (L) >60 mL/min/1.73m2    Glucose 186 (H) 70 - 99 MG/DL    Calcium 8.0 (L) 8.3 - 10.6 MG/DL    Phosphorus 7.8 (H) 2.5 - 4.9 MG/DL    Magnesium 2.8 (H) 1.8 - 2.4 mg/dl   Lactic Acid    Collection Time: 02/06/23 10:13 AM   Result Value Ref Range    Lactate 3.5 (HH) 0.5 - 1.9 mMOL/L   Hepatic Function Panel    Collection Time: 02/06/23 10:13 AM   Result Value Ref Range    Albumin 3.3 (L) 3.4 - 5.0 GM/DL    Total Bilirubin 1.2 (H) 0.0 - 1.0 MG/DL    Bilirubin, Direct 0.7 (H) 0.0 - 0.3 MG/DL    Bilirubin, Indirect 0.5 0 - 0.7 MG/DL    Alkaline Phosphatase 96 40 - 129 IU/L    AST 1,979 (H) 15 - 37 IU/L    ALT 1,027 (H) 10 - 40 U/L    Total Protein 5.4 (L) 6.4 - 8.2 GM/DL   Blood gas, arterial    Collection Time: 02/06/23 10:45 AM   Result Value Ref Range    pH, Bld 7.25 (L) 7.34 - 7.45    pCO2, Arterial 46.0 (H) 32 - 45 MMHG    pO2, Arterial 103 (H) 75 - 100 MMHG    Base Excess 7 (H) 0 - 3.3    HCO3, Arterial 20.2 18 - 23 MMOL/L    CO2 Content 21.6 19 - 24 MMOL/L    O2 Sat 96.0 96 - 97 %    Carbon Monoxide, Blood 1.2 0 - 5 %    Methemoglobin, Arterial 1.1 <1.5 %    Comment VC 22 500 +10 60%    Blood Gas, Venous    Collection Time: 02/06/23 12:00 PM   Result Value Ref Range    pH, Gilberto 7.21 (L) 7.32 - 7.42    pCO2, Gilberto 49 38 - 52 mmHG    pO2, Gilberto 63 (H) 28 - 48 mmHG    Base Excess 8 (H) 0 - 3.3    HCO3, Venous 19.6 19 - 25 MMOL/L    O2 Sat, Gilberto 89.7 (H) 50 - 70 %    Comment VBG         Imaging/Diagnostics Last 24 Hours   XR CHEST (2 VW)    Result Date: 2/2/2023  EXAMINATION: TWO XRAY VIEWS OF THE CHEST 2/2/2023 10:38 am COMPARISON: 02/01/2022 HISTORY: ORDERING SYSTEM PROVIDED HISTORY: chf TECHNOLOGIST PROVIDED HISTORY: Reason for exam:->chf Reason for Exam: chf FINDINGS: There is a left-sided transvenous pacer in place and bilateral pulmonary infiltrates persist unchanged when compared to the prior study. Improved aeration in the lingula     Improved aeration in the lingula. Otherwise stable exam     XR CHEST PORTABLE    Result Date: 2/2/2023  EXAMINATION: ONE X-RAY VIEW OF THE CHEST 2/1/2023 12:29 am COMPARISON: May 21, 2022 HISTORY: ORDERING SYSTEM PROVIDED HISTORY:  Hypoxia, shortness of breath; concern for infectious process or volume overload. TECHNOLOGIST PROVIDED HISTORY: Reason for Exam:  Hypoxia, shortness of breath; concern for infectious process or volume overload. FINDINGS: Left chest wall AICD in place. Cardiomediastinal silhouette is enlarged. The lungs show diffuse ground-glass airspace opacities without significant effusions. No pneumothorax. No acute or aggressive osseous lesion. 1. Diffuse ground-glass airspace opacities concerning for infection over edema. 2. Cardiomegaly.        Electronically signed by Nadya Barrett MD on 2/6/2023 at 3:50 PM

## 2023-02-06 NOTE — PROGRESS NOTES
Spoke with [employer] Guardian--and notified her of the intubation overnight, his current lack of response to stimuli, and overall worsening condition requiring multiple pressors. Discussed with her regarding the current code status of DNR-CCA and she states she would be agreeable to change code status to [facility] due to his worsening condition and overall poor prognosis.

## 2023-02-06 NOTE — PROCEDURES
Arterial Catheter Insertion Procedure Note   Procedure: Insertion of Arterial Line   Procedure Clinician: Jazmin Matthews MD   Procedure Assistant: None   Indications: Hypotension BP monitoring   Size and location: 20g, Right femoral Artery   Attempts: 1   Procedure Details:   Emergent. Under sterile conditions the skin above the right femoral artery was prepped with chlorhexidine. Local anesthesia was applied to the skin and subcutaneous tissues. An Arrow kit was used to insert a 20-gauge needle into the artery. A guide wire was then passed easily through the needle without resistance. A 20 gauge catheter was then inserted into the vessel over the guide wire. The guidewire was then removed with the tip intact. The catheter was then secured into place. Findings: There were no changes to vital signs. Catheter was flushed with 20 mL NS. Patient tolerated the procedure well.

## 2023-02-06 NOTE — PROGRESS NOTES
Left  for Legal Guardian for second person confirmation regarding decision to change code status to [facility].

## 2023-02-06 NOTE — PROGRESS NOTES
Latest Reference Range & Units 2/6/23 04:45   pH, Bld 7.34 - 7.45  7.29 (L)   Base Excess 0 - 3.3  6 (H)   O2 Sat 96 - 97 % 97.0   Carbon Monoxide, Blood 0 - 5 % 1.1   CO2 Content 19 - 24 MMOL/L 21.5   pCO2, Arterial 32 - 45 MMHG 42.0   pO2, Arterial 75 - 100 MMHG 161 (H)   HCO3, Arterial 18 - 23 MMOL/L 20.2   Methemoglobin, Arterial <1.5 % 1.1   (L): Data is abnormally low  (H): Data is abnormally high    ABG results given to RN

## 2023-02-06 NOTE — PROGRESS NOTES
Cardiology Progress Note     Admit Date:  2/1/2023      Subjective and  Overnight Events : Overnight patient was intubated due to progressive respiratory failure. He is in refractory shock requiring multiple vasopressors and inotropes. His urine output has been declining. Past medical history:    has a past medical history of A-fib ([facility] Abdominal pain, Acute on chronic renal failure ([facility] Allergic rhinitis, Bipolar affective disorder unspecified, Bursitis of left shoulder, CAD (coronary artery disease), Cardiomyopathy ([facility] CHF (NYHA class III, ACC/A[facility] stage C) [facility] Cholelithiasis, Chronic kidney disease (CKD), Chronic pain, Depression, Displacement of electrode lead of cardiac pacemaker, DJD (degenerative joint disease) of cervical spine, Elevated serum creatinine, Erectile dysfunction, Fractures, GERD (gastroesophageal reflux disease), GERD (gastroesophageal reflux disease), H/O echocardiogram, Hiatal hernia, History of nuclear stress test, Hypertension, Hypothyroid, Ischemic heart disease, Lipidemia, Lithium toxicity, MI, old, Paronychia, S/P cardiac cath, Schizoaffective disorder ([facility] and Unintentional weight loss. Past surgical history:   has a past surgical history that includes sinus surgery; Tonsillectomy; Cardiac surgery; Cardiac catheterization; angioplasty; pacemaker placement; Endoscopy, colon, diagnostic (9/2015); Gastric fundoplication (64/40/7856); hiatal hernia repair (10/7/15); and Cholecystectomy. Social History:   reports that he has been smoking cigarettes. He has a 23.00 pack-year smoking history. He has never used smokeless tobacco. He reports that he does not drink alcohol and does not use drugs. Family history:  family history includes Mental Illness in his mother, sister, and sister.     Allergies   Allergen Reactions    Penicillins Swelling    Azithromycin     Clozapine [Clozapine] Swelling Haldol [Haloperidol Lactate]     Insulins      Per ecf    Niacin And Related     Ultram [Tramadol]     Zomig [Zolmitriptan]     Codeine Nausea And Vomiting    Depakote [Divalproex Sodium] Other (See Comments)     ' makes eyeball fall out\"    Haldol [Haloperidol Lactate]      Hand and leg tremors with palpitation    Hydrocodone Nausea And Vomiting    Ibuprofen Anxiety     Causes pt to pass out    Imitrex [Sumatriptan] Anxiety    Maxalt [Rizatriptan] Nausea And Vomiting     Makes HA worse    Neurontin [Gabapentin] Other (See Comments)     \" makes eyeballs shrink\"       Review of Systems:    All 14 systems were reviewed and are negative  Except for the positive findings  which as documented       Physical Exam:  Vitals:    02/06/23 1130   BP: (!) 91/50   Pulse:    Resp:    Temp: (!) 102.7 °F (39.3 °C)   SpO2:         General Appearance:  No distress, conversant  Constitutional:  No acute distress, Non-toxic appearance. HENT:  Normocephalic, Atraumatic, Bilateral external ears normal, Oropharynx moist,   Eyes:  PERRL, EOMI, Conjunctiva normal, No discharge. Respiratory:  Normal breath sounds, No respiratory distress, No wheezing  Cardiovascular: S1, S2, no murmurs, gallops. JVD wnl  Abdomen /GI:  Bowel sounds normal, Soft, No tenderness   Genitourinary: No costovertebral angle tenderness   Musculoskeletal:  No edema, no tenderness, no deformities.    Integument:  Well hydrated, no rash   Neurologic:  Alert & oriented x 3, no focal deficits noted       Medications:    albuterol  2.5 mg Nebulization Q6H    vancomycin (VANCOCIN) intermittent dosing (placeholder)   Other RX Placeholder    meropenem  1,000 mg IntraVENous Q12H    sodium chloride (Inhalant)  4 mL Nebulization Q6H    dexamethasone  4 mg IntraVENous Q6H    [Held by provider] sacubitril-valsartan  1 tablet Oral BID    sodium chloride flush  5-40 mL IntraVENous 2 times per day    atorvastatin  80 mg Oral Daily    levothyroxine  75 mcg Oral Daily    aspirin  81 mg Oral Daily    warfarin placeholder: dosing by pharmacy   Other RX Placeholder      dexmedetomidine HCl in NaCl      DOBUTamine 5 mcg/kg/min (02/06/23 3124)    propofol Stopped (02/06/23 1236)    lidocaine 1 mg/min (02/06/23 0750)    sodium chloride Stopped (02/06/23 0223)    bumetanide 0.1 mg/mL infusion 2 mg/hr (02/06/23 0649)    EPINEPHrine 10 mcg/min (02/06/23 0743)    norepinephrine 40 mcg/min (02/06/23 1144)    vasopressin 0.04 Units/min (02/06/23 0649)    sodium chloride       midazolam, fentanNYL, morphine, Benzocaine-Menthol, albuterol, sodium chloride flush, sodium chloride, polyethylene glycol, acetaminophen **OR** acetaminophen, potassium chloride **OR** potassium alternative oral replacement **OR** potassium chloride, magnesium sulfate, calcium carbonate    Lab Data:  CBC:   Recent Labs     02/04/23  0026 02/06/23  0510   WBC 13.2* 15.0*   HGB 10.3* 9.9*   HCT 30.8* 30.4*   MCV 93.1 94.4    227     BMP:   Recent Labs     02/06/23  0040 02/06/23  0510 02/06/23  1013   * 134* 134*   K 3.5 4.0 4.6   CL 95* 94* 94*   CO2 21 19* 19*   PHOS 6.0* 7.3* 7.8*   BUN 33* 39* 45*   CREATININE 1.9* 2.5* 2.8*     PT/INR:   Recent Labs     02/04/23  0026 02/05/23  0108 02/06/23  0510   PROTIME 51.8* 53.1* 52.9*   INR 3.96 4.06 4.04     BNP:    Recent Labs     02/04/23  0026 02/05/23  0108   PROBNP 8,271* 8,993*     TROPONIN:   Recent Labs     02/04/23  1704 02/05/23  1430   TROPONINT 0.055* 0.070*          Assessment and Recommendations:   Refractory shock which is likely mixed  Acute HFrEF  Multiorgan dysfunction        Present we would recommend to wean off dobutamine drip. Continue with other drips. His hemodynamics are consistent with mixed shock picture with current cardiac index of 2.6, RA pressure of 15 and PA diastolic of 93-76. Overall he has very poor prognosis.       All labs, medications and tests reviewed by myself , continue all other medications of all above medical condition listed as is except for changes mentioned above. Thank you very much for consult , please call with questions.     Jaylen Gilbert MD, MD 2/6/2023 1:04 PM

## 2023-02-06 NOTE — PROGRESS NOTES
Latest Reference Range & Units 2/5/23 21:15   pH, Bld 7.34 - 7.45  7.18 (L)   Base Excess 0 - 3.3  6 (H)   O2 Sat 96 - 97 % 96.8   Carbon Monoxide, Blood 0 - 5 % 1.4   CO2 Content 19 - 24 MMOL/L 25.0 (H)   pCO2, Arterial 32 - 45 MMHG 62.0 (H)   pO2, Arterial 75 - 100 MMHG 196 (H)   HCO3, Arterial 18 - 23 MMOL/L 23.1 (H)   Methemoglobin, Arterial <1.5 % 1.2   (L): Data is abnormally low  (H): Data is abnormally high    ABG shown to Dr. Sarai Greene.

## 2023-02-06 NOTE — PROGRESS NOTES
Called to patient's bedside for noted change in rhythm and like evidence of V. tach on telemetry. Arrived to find patient to be extremely tachypneic hypoxic with trouble breathing. Patient was emergently intubated (please see intubation note for details). Patient was resuscitated with placement of A-line emergently (please see note for details). Upon further evaluation patient's vasoactive agents were uptitrated given his subsequent hypotension and increased thoracic pressure which is affecting his hemodynamics. Patient was oxygen was stabilized, blood pressure was also stabilized accordingly on up titration of vasoactive agents and regulation of inotropic agents. Bedside echo showed that LV was enlarged with decreased output EF was noted to be moderately to severely reduced, RV was noted to be normal in appearance. Decision was made to place Mcarthur-Petros catheter with appropriate findings and placement as noted in note (please see separate note for details). Findings consistent with cardiogenic failure and cardiogenic shock. Rohan 1 [(54-35)/18],  0.62. Overall poor prognosis. Cardiac output was noted to be 4.8 with cardiac index of 3 on current inotropic support and diuretics. Lasix was switched to Bumex. Critical care attestation:    I spent 65 cumulative minutes (excluding procedures), in full attention to this critically ill patient. I have personally reviewed the patient's history and interval events in the EMR, along with vitals, labs and radiology imaging. Critical care time was spent personally providing care for this patient, excluding billable procedures, and no overlapping with any other provider. This includes documenting my assessment and plan of care and developing the care plan to treat the problems below.     The high probability of deterioration required my full and direct attention, intervention, and personal management due to do to underlying diagnosis and clinical problems including the treatment of active or impending:  [] Neurological monitoring and treatment  [x] Respiratory failure -assessment of ventilator support, adjustment, ventilator weaning  [x] Hemodynamic and volume assessment with volume resuscitation  [x] Mechanical and/or chemical support of the circulation,  [x] Frequent vasoactive agent adjustment,  [] Renal replacement therapy,  [x] For rapid decompensation,  [] Electrolyte instability  [] Suctioning every 2 hours  [] Every hour neuro checks  [] Every hour neurovascular checks  [x] Frequent evaluation of patient's response to treatment and titration of therapies,  [x] Interpretation of laboratory and radiological data,  [x] Application and interpretation of advanced monitoring technologies,  [x] Extensive interpretation of multiple databases,  [x] Development of treatment plan with patient, surrogate, or consultants.   [] Others:

## 2023-02-07 NOTE — PROGRESS NOTES
Physician Progress Note      PATIENT:               Edu Newton  CSN #:                  [id number]  :                       1955  ADMIT DATE:       2023 12:13 AM  DISCH DATE:        2023 1:00 AM  RESPONDING  PROVIDER #:        Luis Carroll MD          QUERY TEXT:    Pt admitted with Acute on chronic systolic and diastolic CHF. Pt noted to have   possible pneumonia . If possible, please document in the progress notes and   discharge summary if you are evaluating and /or treating any of the following: The medical record reflects the following:  Risk Factors: Possible pneumonia, Acute on chronic heart failure, respiratory   failure, IRVIN  Clinical Indicators: WBC 8.8->14.6, LA POA 3.4->2.3, Temperature of 100.4. Given Tylenol and recheck recorded 101.2, Chest x-ray from  showed diffuse   groundglass opacities favoring infectious process. Blood cultures on   admission showed 1 of 4 bottles being positive for staph epididymitis. \"\"ARDS   with possible sepsis: Patient has very high CRP as well as high white count   and I do not think heart failure alone can explain that. \" 2/5 PN from Cardio,   \"Severe sepsis -possibly 2/2 pneumonia,Respiratory distress on arrival.    Intermittently hypotensive, high grade fever (101.2)   Treatment: Vanco and cefepime, MRSA screening, Labs, Blood cx, CXR, now vented    Thank you, Tonya Galarza RN, CDS ([phone])  Options provided:  -- Sepsis, present on admission  -- Sepsis, developed following admission  -- Other - I will add my own diagnosis  -- Disagree - Not applicable / Not valid  -- Disagree - Clinically unable to determine / Unknown  -- Refer to Clinical Documentation Reviewer    PROVIDER RESPONSE TEXT:    This patient has sepsis that developed following admission. Query created by: Cheryl Carnes on 2/3/2023 2:07 PM      QUERY TEXT:    Patient admitted with Acute on chronic diastolic and systolic CHF.  Noted   Nutrition consultant documentation of Moderate Malnutrition. If possible,   please document in progress notes and discharge summary if you are evaluating   and /or treating any of the following: The medical record reflects the following:  Risk Factors: difficulty swallowing/chewing, CHF  Clinical Indicators: Moderate malnutrition, In context of chronic illness   related to impaired respiratory function as evidenced by localized or   generalized fluid accumulation, mild muscle loss, mild loss of subcutaneous   fat, Current BMI (kg/m2): 21.6  Treatment: Start Oral Nutrition Supplement, Continue Current Diet, Nutrition   consult    Thank you, Liana Leroy, [facility] ([phone])    ASPEN Criteria:    https://aspenjournals. onlinelibrary. chun. com/doi/full/10.1177/937484544089625  5  Options provided:  -- Protein calorie malnutrition moderate  -- Other - I will add my own diagnosis  -- Disagree - Not applicable / Not valid  -- Disagree - Clinically unable to determine / Unknown  -- Refer to Clinical Documentation Reviewer    PROVIDER RESPONSE TEXT:    This patient has moderate protein calorie malnutrition.     Query created by: Elder Brody on 2/3/2023 2:11 PM      Electronically signed by:  Beth Merino MD 2/7/2023 10:25 AM

## 2023-02-07 NOTE — PLAN OF CARE
Problem: Discharge Planning  Goal: Discharge to home or other facility with appropriate resources  Outcome: Progressing  Flowsheets  Taken 2/6/2023 2000 by Cordell Aceves RN  Discharge to home or other facility with appropriate resources:   Identify barriers to discharge with patient and caregiver   Arrange for needed discharge resources and transportation as appropriate   Identify discharge learning needs (meds, wound care, etc)  Taken 2/6/2023 1600 by Prashant Tabor RN  Discharge to home or other facility with appropriate resources: Identify discharge learning needs (meds, wound care, etc)     Problem: Skin/Tissue Integrity  Goal: Absence of new skin breakdown  Description: 1. Monitor for areas of redness and/or skin breakdown  2. Assess vascular access sites hourly  3. Every 4-6 hours minimum:  Change oxygen saturation probe site  4. Every 4-6 hours:  If on nasal continuous positive airway pressure, respiratory therapy assess nares and determine need for appliance change or resting period.   Outcome: Progressing     Problem: Nutrition Deficit:  Goal: Optimize nutritional status  Outcome: Progressing     Problem: Chronic Conditions and Co-morbidities  Goal: Patient's chronic conditions and co-morbidity symptoms are monitored and maintained or improved  Outcome: Progressing  Flowsheets  Taken 2/6/2023 2000 by Ras Fischer 34 - Patient's Chronic Conditions and Co-Morbidity Symptoms are Monitored and Maintained or Improved:   Monitor and assess patient's chronic conditions and comorbid symptoms for stability, deterioration, or improvement   Collaborate with multidisciplinary team to address chronic and comorbid conditions and prevent exacerbation or deterioration   Update acute care plan with appropriate goals if chronic or comorbid symptoms are exacerbated and prevent overall improvement and discharge  Taken 2/6/2023 1600 by Ras Caceres 34 - Patient's Chronic Conditions and Co-Morbidity Symptoms are Monitored and Maintained or Improved: Monitor and assess patient's chronic conditions and comorbid symptoms for stability, deterioration, or improvement     Problem: Safety - Adult  Goal: Free from fall injury  Outcome: Progressing  Flowsheets (Taken 2/6/2023 2000)  Free From Fall Injury:   Instruct family/caregiver on patient safety   Based on caregiver fall risk screen, instruct family/caregiver to ask for assistance with transferring infant if caregiver noted to have fall risk factors     Problem: Pain  Goal: Verbalizes/displays adequate comfort level or baseline comfort level  Outcome: Progressing  Flowsheets (Taken 2/6/2023 2000)  Verbalizes/displays adequate comfort level or baseline comfort level:   Assess pain using appropriate pain scale   Administer analgesics based on type and severity of pain and evaluate response   Implement non-pharmacological measures as appropriate and evaluate response   Consider cultural and social influences on pain and pain management   Notify Licensed Independent Practitioner if interventions unsuccessful or patient reports new pain     Problem: Safety - Medical Restraint  Goal: Remains free of injury from restraints (Restraint for Interference with Medical Device)  Description: INTERVENTIONS:  1. Determine that other, less restrictive measures have been tried or would not be effective before applying the restraint  2. Evaluate the patient's condition at the time of restraint application  3. Inform patient/family regarding the reason for restraint  4.  Q2H: Monitor safety, psychosocial status, comfort, nutrition and hydration  Outcome: Progressing  Flowsheets  Taken 2/6/2023 2000 by Raymon Pittman RN  Remains free of injury from restraints (restraint for interference with medical device): Determine that other, less restrictive measures have been tried or would not be effective before applying the restraint  Taken 2/6/2023 1900 by Dion Ulloa RN  Remains free of injury from restraints (restraint for interference with medical device): Every 2 hours: Monitor safety, psychosocial status, comfort, nutrition and hydration  Taken 2/6/2023 1800 by Peace Varela RN  Remains free of injury from restraints (restraint for interference with medical device): Every 2 hours: Monitor safety, psychosocial status, comfort, nutrition and hydration  Taken 2/6/2023 1700 by Peace Varela RN  Remains free of injury from restraints (restraint for interference with medical device): Every 2 hours: Monitor safety, psychosocial status, comfort, nutrition and hydration  Taken 2/6/2023 1600 by Peace Varela RN  Remains free of injury from restraints (restraint for interference with medical device): Every 2 hours: Monitor safety, psychosocial status, comfort, nutrition and hydration  Taken 2/6/2023 1500 by Peace Varela RN  Remains free of injury from restraints (restraint for interference with medical device): Every 2 hours: Monitor safety, psychosocial status, comfort, nutrition and hydration  Taken 2/6/2023 1400 by Peace Varela RN  Remains free of injury from restraints (restraint for interference with medical device): Every 2 hours: Monitor safety, psychosocial status, comfort, nutrition and hydration  Taken 2/6/2023 1300 by Peace Varela RN  Remains free of injury from restraints (restraint for interference with medical device): Every 2 hours: Monitor safety, psychosocial status, comfort, nutrition and hydration  Taken 2/6/2023 1200 by Peace Varela RN  Remains free of injury from restraints (restraint for interference with medical device): Every 2 hours: Monitor safety, psychosocial status, comfort, nutrition and hydration  Taken 2/6/2023 1100 by Peace Varela RN  Remains free of injury from restraints (restraint for interference with medical device): Every 2 hours: Monitor safety, psychosocial status, comfort, nutrition and hydration  Taken 2/6/2023 1000 by Dion Ulloa RN  Remains free of injury from restraints (restraint for interference with medical device): Every 2 hours: Monitor safety, psychosocial status, comfort, nutrition and hydration  Taken 2/6/2023 0900 by Dion Ulloa RN  Remains free of injury from restraints (restraint for interference with medical device): Every 2 hours: Monitor safety, psychosocial status, comfort, nutrition and hydration  Taken 2/6/2023 0805 by Dion Ulloa RN  Remains free of injury from restraints (restraint for interference with medical device): Every 2 hours: Monitor safety, psychosocial status, comfort, nutrition and hydration     Problem: ABCDS Injury Assessment  Goal: Absence of physical injury  Outcome: Progressing

## 2023-02-07 NOTE — PROGRESS NOTES
@5371: terminal extubation meds given per order.   @2300: Pt extubated by RT and placed on 2l NC per order

## 2023-02-08 LAB
CULTURE: NORMAL
CULTURE: NORMAL
Lab: NORMAL
Lab: NORMAL
SPECIMEN: NORMAL
SPECIMEN: NORMAL